# Patient Record
Sex: MALE | Race: WHITE | NOT HISPANIC OR LATINO | Employment: OTHER | URBAN - NONMETROPOLITAN AREA
[De-identification: names, ages, dates, MRNs, and addresses within clinical notes are randomized per-mention and may not be internally consistent; named-entity substitution may affect disease eponyms.]

---

## 2021-06-17 ENCOUNTER — HOSPITAL ENCOUNTER (EMERGENCY)
Facility: HOSPITAL | Age: 71
Discharge: ANOTHER HOSPITAL | End: 2021-06-17
Attending: FAMILY MEDICINE | Admitting: FAMILY MEDICINE

## 2021-06-17 ENCOUNTER — HOSPITAL ENCOUNTER (OUTPATIENT)
Dept: HOSPITAL 79 - MED SURG 4 | Age: 71
Setting detail: OBSERVATION
LOS: 4 days | Discharge: HOME | End: 2021-06-21
Attending: HOSPITALIST | Admitting: EMERGENCY MEDICINE
Payer: MEDICARE

## 2021-06-17 ENCOUNTER — APPOINTMENT (OUTPATIENT)
Dept: CT IMAGING | Facility: HOSPITAL | Age: 71
End: 2021-06-17

## 2021-06-17 VITALS
HEIGHT: 64 IN | BODY MASS INDEX: 44.39 KG/M2 | DIASTOLIC BLOOD PRESSURE: 70 MMHG | WEIGHT: 260 LBS | SYSTOLIC BLOOD PRESSURE: 152 MMHG | OXYGEN SATURATION: 96 % | HEART RATE: 72 BPM | TEMPERATURE: 98.6 F | RESPIRATION RATE: 16 BRPM

## 2021-06-17 VITALS — WEIGHT: 260 LBS | BODY MASS INDEX: 44.39 KG/M2 | HEIGHT: 64 IN

## 2021-06-17 DIAGNOSIS — R53.1: ICD-10-CM

## 2021-06-17 DIAGNOSIS — Z79.899: ICD-10-CM

## 2021-06-17 DIAGNOSIS — Z86.16: ICD-10-CM

## 2021-06-17 DIAGNOSIS — K92.2 GASTROINTESTINAL HEMORRHAGE, UNSPECIFIED GASTROINTESTINAL HEMORRHAGE TYPE: Primary | ICD-10-CM

## 2021-06-17 DIAGNOSIS — K57.30: ICD-10-CM

## 2021-06-17 DIAGNOSIS — K64.1: Primary | ICD-10-CM

## 2021-06-17 DIAGNOSIS — E66.01: ICD-10-CM

## 2021-06-17 DIAGNOSIS — N30.00: ICD-10-CM

## 2021-06-17 DIAGNOSIS — D64.9: ICD-10-CM

## 2021-06-17 DIAGNOSIS — R31.9 URINARY TRACT INFECTION WITH HEMATURIA, SITE UNSPECIFIED: ICD-10-CM

## 2021-06-17 DIAGNOSIS — I10: ICD-10-CM

## 2021-06-17 DIAGNOSIS — Z20.822: ICD-10-CM

## 2021-06-17 DIAGNOSIS — N40.0: ICD-10-CM

## 2021-06-17 DIAGNOSIS — Z86.73: ICD-10-CM

## 2021-06-17 DIAGNOSIS — N39.0 URINARY TRACT INFECTION WITH HEMATURIA, SITE UNSPECIFIED: ICD-10-CM

## 2021-06-17 LAB
ABO GROUP BLD: NORMAL
ABO GROUP BLD: NORMAL
ALBUMIN SERPL-MCNC: 3.44 G/DL (ref 3.5–5.2)
ALBUMIN/GLOB SERPL: 1 G/DL
ALP SERPL-CCNC: 82 U/L (ref 39–117)
ALT SERPL W P-5'-P-CCNC: 7 U/L (ref 1–41)
ANION GAP SERPL CALCULATED.3IONS-SCNC: 9.4 MMOL/L (ref 5–15)
AST SERPL-CCNC: 20 U/L (ref 1–40)
BACTERIA UR QL AUTO: ABNORMAL /HPF
BASOPHILS # BLD AUTO: 0.07 10*3/MM3 (ref 0–0.2)
BASOPHILS NFR BLD AUTO: 0.7 % (ref 0–1.5)
BILIRUB SERPL-MCNC: 0.7 MG/DL (ref 0–1.2)
BILIRUB UR QL STRIP: NEGATIVE
BLD GP AB SCN SERPL QL: NEGATIVE
BUN SERPL-MCNC: 14 MG/DL (ref 8–23)
BUN/CREAT SERPL: 10.9 (ref 7–25)
CALCIUM SPEC-SCNC: 9.1 MG/DL (ref 8.6–10.5)
CHLORIDE SERPL-SCNC: 105 MMOL/L (ref 98–107)
CLARITY UR: ABNORMAL
CO2 SERPL-SCNC: 26.6 MMOL/L (ref 22–29)
COLOR UR: YELLOW
CREAT SERPL-MCNC: 1.29 MG/DL (ref 0.76–1.27)
CRP SERPL-MCNC: 4.64 MG/DL (ref 0–0.5)
D-LACTATE SERPL-SCNC: 1.5 MMOL/L (ref 0.5–2)
DEPRECATED RDW RBC AUTO: 52.6 FL (ref 37–54)
EOSINOPHIL # BLD AUTO: 0.09 10*3/MM3 (ref 0–0.4)
EOSINOPHIL NFR BLD AUTO: 0.9 % (ref 0.3–6.2)
ERYTHROCYTE [DISTWIDTH] IN BLOOD BY AUTOMATED COUNT: 16.2 % (ref 12.3–15.4)
GFR SERPL CREATININE-BSD FRML MDRD: 55 ML/MIN/1.73
GLOBULIN UR ELPH-MCNC: 3.4 GM/DL
GLUCOSE SERPL-MCNC: 107 MG/DL (ref 65–99)
GLUCOSE UR STRIP-MCNC: NEGATIVE MG/DL
HCT VFR BLD AUTO: 35.3 % (ref 37.5–51)
HGB BLD-MCNC: 11.2 G/DL (ref 13–17.7)
HGB UR QL STRIP.AUTO: ABNORMAL
HOLD SPECIMEN: NORMAL
HOLD SPECIMEN: NORMAL
HYALINE CASTS UR QL AUTO: ABNORMAL /LPF
IMM GRANULOCYTES # BLD AUTO: 0.05 10*3/MM3 (ref 0–0.05)
IMM GRANULOCYTES NFR BLD AUTO: 0.5 % (ref 0–0.5)
INR PPP: 1.02 (ref 0.9–1.1)
KETONES UR QL STRIP: NEGATIVE
LEUKOCYTE ESTERASE UR QL STRIP.AUTO: ABNORMAL
LIPASE SERPL-CCNC: 12 U/L (ref 13–60)
LYMPHOCYTES # BLD AUTO: 3.58 10*3/MM3 (ref 0.7–3.1)
LYMPHOCYTES NFR BLD AUTO: 34.2 % (ref 19.6–45.3)
MCH RBC QN AUTO: 28 PG (ref 26.6–33)
MCHC RBC AUTO-ENTMCNC: 31.7 G/DL (ref 31.5–35.7)
MCV RBC AUTO: 88.3 FL (ref 79–97)
MONOCYTES # BLD AUTO: 1.01 10*3/MM3 (ref 0.1–0.9)
MONOCYTES NFR BLD AUTO: 9.7 % (ref 5–12)
NEUTROPHILS NFR BLD AUTO: 5.66 10*3/MM3 (ref 1.7–7)
NEUTROPHILS NFR BLD AUTO: 54 % (ref 42.7–76)
NITRITE UR QL STRIP: POSITIVE
NRBC BLD AUTO-RTO: 0 /100 WBC (ref 0–0.2)
PH UR STRIP.AUTO: 8.5 [PH] (ref 5–8)
PLATELET # BLD AUTO: 296 10*3/MM3 (ref 140–450)
PMV BLD AUTO: 10 FL (ref 6–12)
POTASSIUM SERPL-SCNC: 4.8 MMOL/L (ref 3.5–5.2)
PROT SERPL-MCNC: 6.8 G/DL (ref 6–8.5)
PROT UR QL STRIP: ABNORMAL
PROTHROMBIN TIME: 13.8 SECONDS (ref 12.8–14.5)
RBC # BLD AUTO: 4 10*6/MM3 (ref 4.14–5.8)
RBC # UR: ABNORMAL /HPF
REF LAB TEST METHOD: ABNORMAL
RH BLD: POSITIVE
RH BLD: POSITIVE
SODIUM SERPL-SCNC: 141 MMOL/L (ref 136–145)
SP GR UR STRIP: 1.01 (ref 1–1.03)
SQUAMOUS #/AREA URNS HPF: ABNORMAL /HPF
T&S EXPIRATION DATE: NORMAL
UROBILINOGEN UR QL STRIP: ABNORMAL
WBC # BLD AUTO: 10.46 10*3/MM3 (ref 3.4–10.8)
WBC UR QL AUTO: ABNORMAL /HPF
WHOLE BLOOD HOLD SPECIMEN: NORMAL

## 2021-06-17 PROCEDURE — 85025 COMPLETE CBC W/AUTO DIFF WBC: CPT | Performed by: FAMILY MEDICINE

## 2021-06-17 PROCEDURE — 74177 CT ABD & PELVIS W/CONTRAST: CPT | Performed by: RADIOLOGY

## 2021-06-17 PROCEDURE — 25010000002 CEFTRIAXONE PER 250 MG: Performed by: FAMILY MEDICINE

## 2021-06-17 PROCEDURE — 86900 BLOOD TYPING SEROLOGIC ABO: CPT | Performed by: FAMILY MEDICINE

## 2021-06-17 PROCEDURE — C9113 INJ PANTOPRAZOLE SODIUM, VIA: HCPCS

## 2021-06-17 PROCEDURE — 86901 BLOOD TYPING SEROLOGIC RH(D): CPT

## 2021-06-17 PROCEDURE — G0379 DIRECT REFER HOSPITAL OBSERV: HCPCS

## 2021-06-17 PROCEDURE — 83690 ASSAY OF LIPASE: CPT | Performed by: FAMILY MEDICINE

## 2021-06-17 PROCEDURE — 74177 CT ABD & PELVIS W/CONTRAST: CPT

## 2021-06-17 PROCEDURE — U0002 COVID-19 LAB TEST NON-CDC: HCPCS

## 2021-06-17 PROCEDURE — 85610 PROTHROMBIN TIME: CPT | Performed by: FAMILY MEDICINE

## 2021-06-17 PROCEDURE — 83605 ASSAY OF LACTIC ACID: CPT | Performed by: FAMILY MEDICINE

## 2021-06-17 PROCEDURE — 87040 BLOOD CULTURE FOR BACTERIA: CPT | Performed by: FAMILY MEDICINE

## 2021-06-17 PROCEDURE — 36415 COLL VENOUS BLD VENIPUNCTURE: CPT

## 2021-06-17 PROCEDURE — G0378 HOSPITAL OBSERVATION PER HR: HCPCS

## 2021-06-17 PROCEDURE — 80053 COMPREHEN METABOLIC PANEL: CPT | Performed by: FAMILY MEDICINE

## 2021-06-17 PROCEDURE — 86850 RBC ANTIBODY SCREEN: CPT | Performed by: FAMILY MEDICINE

## 2021-06-17 PROCEDURE — 81001 URINALYSIS AUTO W/SCOPE: CPT | Performed by: FAMILY MEDICINE

## 2021-06-17 PROCEDURE — 86140 C-REACTIVE PROTEIN: CPT | Performed by: FAMILY MEDICINE

## 2021-06-17 PROCEDURE — 86901 BLOOD TYPING SEROLOGIC RH(D): CPT | Performed by: FAMILY MEDICINE

## 2021-06-17 PROCEDURE — P9612 CATHETERIZE FOR URINE SPEC: HCPCS

## 2021-06-17 PROCEDURE — 99284 EMERGENCY DEPT VISIT MOD MDM: CPT

## 2021-06-17 PROCEDURE — 96365 THER/PROPH/DIAG IV INF INIT: CPT

## 2021-06-17 PROCEDURE — 86900 BLOOD TYPING SEROLOGIC ABO: CPT

## 2021-06-17 PROCEDURE — 25010000002 IOPAMIDOL 61 % SOLUTION: Performed by: FAMILY MEDICINE

## 2021-06-17 RX ORDER — SODIUM CHLORIDE 0.9 % (FLUSH) 0.9 %
10 SYRINGE (ML) INJECTION AS NEEDED
Status: DISCONTINUED | OUTPATIENT
Start: 2021-06-17 | End: 2021-06-17 | Stop reason: HOSPADM

## 2021-06-17 RX ADMIN — CEFTRIAXONE 1 G: 1 INJECTION, POWDER, FOR SOLUTION INTRAMUSCULAR; INTRAVENOUS at 16:35

## 2021-06-17 RX ADMIN — IOPAMIDOL 100 ML: 612 INJECTION, SOLUTION INTRAVENOUS at 16:38

## 2021-06-18 LAB
BUN/CREATININE RATIO: 12 (ref 0–10)
HGB BLD-MCNC: 10.4 GM/DL (ref 14–17.5)
RED BLOOD COUNT: 3.62 M/UL (ref 4.2–5.5)
WHITE BLOOD COUNT: 8.3 K/UL (ref 4.5–11)

## 2021-06-19 LAB
BUN/CREATININE RATIO: 10 (ref 0–10)
HGB BLD-MCNC: 10.5 GM/DL (ref 14–17.5)
RED BLOOD COUNT: 3.64 M/UL (ref 4.2–5.5)
WHITE BLOOD COUNT: 9.2 K/UL (ref 4.5–11)

## 2021-06-20 LAB
BUN/CREATININE RATIO: 13 (ref 0–10)
HGB BLD-MCNC: 10.9 GM/DL (ref 14–17.5)
RED BLOOD COUNT: 3.82 M/UL (ref 4.2–5.5)
WHITE BLOOD COUNT: 8.9 K/UL (ref 4.5–11)

## 2021-06-21 LAB
BUN/CREATININE RATIO: 10 (ref 0–10)
HGB BLD-MCNC: 11.4 GM/DL (ref 14–17.5)
RED BLOOD COUNT: 3.99 M/UL (ref 4.2–5.5)
WHITE BLOOD COUNT: 8.8 K/UL (ref 4.5–11)

## 2021-06-22 LAB
BACTERIA SPEC AEROBE CULT: NORMAL
BACTERIA SPEC AEROBE CULT: NORMAL

## 2022-10-06 ENCOUNTER — APPOINTMENT (OUTPATIENT)
Dept: GENERAL RADIOLOGY | Facility: HOSPITAL | Age: 72
End: 2022-10-06

## 2022-10-06 ENCOUNTER — APPOINTMENT (OUTPATIENT)
Dept: CT IMAGING | Facility: HOSPITAL | Age: 72
End: 2022-10-06

## 2022-10-06 ENCOUNTER — HOSPITAL ENCOUNTER (EMERGENCY)
Facility: HOSPITAL | Age: 72
Discharge: HOME | End: 2022-10-06
Attending: STUDENT IN AN ORGANIZED HEALTH CARE EDUCATION/TRAINING PROGRAM | Admitting: STUDENT IN AN ORGANIZED HEALTH CARE EDUCATION/TRAINING PROGRAM

## 2022-10-06 VITALS
HEART RATE: 81 BPM | RESPIRATION RATE: 18 BRPM | OXYGEN SATURATION: 92 % | DIASTOLIC BLOOD PRESSURE: 76 MMHG | BODY MASS INDEX: 42.52 KG/M2 | HEIGHT: 63 IN | SYSTOLIC BLOOD PRESSURE: 145 MMHG | TEMPERATURE: 98.2 F | WEIGHT: 240 LBS

## 2022-10-06 DIAGNOSIS — M87.051 AVASCULAR NECROSIS OF BONE OF RIGHT HIP: Primary | ICD-10-CM

## 2022-10-06 LAB
ALBUMIN SERPL-MCNC: 3.8 G/DL (ref 3.5–5.2)
ALBUMIN/GLOB SERPL: 1 G/DL
ALP SERPL-CCNC: 78 U/L (ref 39–117)
ALT SERPL W P-5'-P-CCNC: 33 U/L (ref 1–41)
ANION GAP SERPL CALCULATED.3IONS-SCNC: 12.8 MMOL/L (ref 5–15)
AST SERPL-CCNC: 100 U/L (ref 1–40)
BASOPHILS # BLD AUTO: 0.02 10*3/MM3 (ref 0–0.2)
BASOPHILS NFR BLD AUTO: 0.2 % (ref 0–1.5)
BILIRUB SERPL-MCNC: 0.9 MG/DL (ref 0–1.2)
BUN SERPL-MCNC: 32 MG/DL (ref 8–23)
BUN/CREAT SERPL: 23.2 (ref 7–25)
CALCIUM SPEC-SCNC: 9.2 MG/DL (ref 8.6–10.5)
CHLORIDE SERPL-SCNC: 105 MMOL/L (ref 98–107)
CO2 SERPL-SCNC: 23.2 MMOL/L (ref 22–29)
CREAT SERPL-MCNC: 1.38 MG/DL (ref 0.76–1.27)
DEPRECATED RDW RBC AUTO: 49.2 FL (ref 37–54)
EGFRCR SERPLBLD CKD-EPI 2021: 54.3 ML/MIN/1.73
EOSINOPHIL # BLD AUTO: 0.01 10*3/MM3 (ref 0–0.4)
EOSINOPHIL NFR BLD AUTO: 0.1 % (ref 0.3–6.2)
ERYTHROCYTE [DISTWIDTH] IN BLOOD BY AUTOMATED COUNT: 15 % (ref 12.3–15.4)
FLUAV SUBTYP SPEC NAA+PROBE: NOT DETECTED
FLUBV RNA ISLT QL NAA+PROBE: NOT DETECTED
GLOBULIN UR ELPH-MCNC: 3.7 GM/DL
GLUCOSE SERPL-MCNC: 166 MG/DL (ref 65–99)
HCT VFR BLD AUTO: 38.8 % (ref 37.5–51)
HGB BLD-MCNC: 13.1 G/DL (ref 13–17.7)
IMM GRANULOCYTES # BLD AUTO: 0.06 10*3/MM3 (ref 0–0.05)
IMM GRANULOCYTES NFR BLD AUTO: 0.5 % (ref 0–0.5)
INR PPP: 1.02 (ref 0.9–1.1)
LYMPHOCYTES # BLD AUTO: 0.8 10*3/MM3 (ref 0.7–3.1)
LYMPHOCYTES NFR BLD AUTO: 6 % (ref 19.6–45.3)
MCH RBC QN AUTO: 29.8 PG (ref 26.6–33)
MCHC RBC AUTO-ENTMCNC: 33.8 G/DL (ref 31.5–35.7)
MCV RBC AUTO: 88.2 FL (ref 79–97)
MONOCYTES # BLD AUTO: 0.91 10*3/MM3 (ref 0.1–0.9)
MONOCYTES NFR BLD AUTO: 6.9 % (ref 5–12)
NEUTROPHILS NFR BLD AUTO: 11.43 10*3/MM3 (ref 1.7–7)
NEUTROPHILS NFR BLD AUTO: 86.3 % (ref 42.7–76)
NRBC BLD AUTO-RTO: 0 /100 WBC (ref 0–0.2)
PLATELET # BLD AUTO: 179 10*3/MM3 (ref 140–450)
PMV BLD AUTO: 10.1 FL (ref 6–12)
POTASSIUM SERPL-SCNC: 4.4 MMOL/L (ref 3.5–5.2)
PROT SERPL-MCNC: 7.5 G/DL (ref 6–8.5)
PROTHROMBIN TIME: 13.6 SECONDS (ref 12.1–14.7)
RBC # BLD AUTO: 4.4 10*6/MM3 (ref 4.14–5.8)
SARS-COV-2 RNA PNL SPEC NAA+PROBE: NOT DETECTED
SODIUM SERPL-SCNC: 141 MMOL/L (ref 136–145)
WBC NRBC COR # BLD: 13.23 10*3/MM3 (ref 3.4–10.8)

## 2022-10-06 PROCEDURE — 96376 TX/PRO/DX INJ SAME DRUG ADON: CPT

## 2022-10-06 PROCEDURE — 96374 THER/PROPH/DIAG INJ IV PUSH: CPT

## 2022-10-06 PROCEDURE — 73502 X-RAY EXAM HIP UNI 2-3 VIEWS: CPT | Performed by: RADIOLOGY

## 2022-10-06 PROCEDURE — 87636 SARSCOV2 & INF A&B AMP PRB: CPT | Performed by: STUDENT IN AN ORGANIZED HEALTH CARE EDUCATION/TRAINING PROGRAM

## 2022-10-06 PROCEDURE — 96375 TX/PRO/DX INJ NEW DRUG ADDON: CPT

## 2022-10-06 PROCEDURE — 80053 COMPREHEN METABOLIC PANEL: CPT | Performed by: STUDENT IN AN ORGANIZED HEALTH CARE EDUCATION/TRAINING PROGRAM

## 2022-10-06 PROCEDURE — C9803 HOPD COVID-19 SPEC COLLECT: HCPCS

## 2022-10-06 PROCEDURE — 72192 CT PELVIS W/O DYE: CPT

## 2022-10-06 PROCEDURE — 72192 CT PELVIS W/O DYE: CPT | Performed by: RADIOLOGY

## 2022-10-06 PROCEDURE — 25010000002 DEXAMETHASONE PER 1 MG: Performed by: STUDENT IN AN ORGANIZED HEALTH CARE EDUCATION/TRAINING PROGRAM

## 2022-10-06 PROCEDURE — 85025 COMPLETE CBC W/AUTO DIFF WBC: CPT | Performed by: STUDENT IN AN ORGANIZED HEALTH CARE EDUCATION/TRAINING PROGRAM

## 2022-10-06 PROCEDURE — 99284 EMERGENCY DEPT VISIT MOD MDM: CPT

## 2022-10-06 PROCEDURE — 85610 PROTHROMBIN TIME: CPT | Performed by: STUDENT IN AN ORGANIZED HEALTH CARE EDUCATION/TRAINING PROGRAM

## 2022-10-06 PROCEDURE — 25010000002 MORPHINE PER 10 MG: Performed by: STUDENT IN AN ORGANIZED HEALTH CARE EDUCATION/TRAINING PROGRAM

## 2022-10-06 PROCEDURE — 25010000002 ONDANSETRON PER 1 MG: Performed by: STUDENT IN AN ORGANIZED HEALTH CARE EDUCATION/TRAINING PROGRAM

## 2022-10-06 PROCEDURE — 25010000002 KETOROLAC TROMETHAMINE PER 15 MG: Performed by: STUDENT IN AN ORGANIZED HEALTH CARE EDUCATION/TRAINING PROGRAM

## 2022-10-06 PROCEDURE — 73502 X-RAY EXAM HIP UNI 2-3 VIEWS: CPT

## 2022-10-06 RX ORDER — DEXAMETHASONE SODIUM PHOSPHATE 4 MG/ML
8 INJECTION, SOLUTION INTRA-ARTICULAR; INTRALESIONAL; INTRAMUSCULAR; INTRAVENOUS; SOFT TISSUE ONCE
Status: COMPLETED | OUTPATIENT
Start: 2022-10-06 | End: 2022-10-06

## 2022-10-06 RX ORDER — KETOROLAC TROMETHAMINE 30 MG/ML
15 INJECTION, SOLUTION INTRAMUSCULAR; INTRAVENOUS ONCE
Status: COMPLETED | OUTPATIENT
Start: 2022-10-06 | End: 2022-10-06

## 2022-10-06 RX ORDER — HYDROCODONE BITARTRATE AND ACETAMINOPHEN 5; 325 MG/1; MG/1
1 TABLET ORAL EVERY 8 HOURS PRN
Qty: 10 TABLET | Refills: 0 | Status: SHIPPED | OUTPATIENT
Start: 2022-10-06

## 2022-10-06 RX ORDER — ONDANSETRON 2 MG/ML
4 INJECTION INTRAMUSCULAR; INTRAVENOUS ONCE
Status: COMPLETED | OUTPATIENT
Start: 2022-10-06 | End: 2022-10-06

## 2022-10-06 RX ORDER — MORPHINE SULFATE 2 MG/ML
2 INJECTION, SOLUTION INTRAMUSCULAR; INTRAVENOUS ONCE
Status: COMPLETED | OUTPATIENT
Start: 2022-10-06 | End: 2022-10-06

## 2022-10-06 RX ORDER — HYDROCODONE BITARTRATE AND ACETAMINOPHEN 5; 325 MG/1; MG/1
1 TABLET ORAL ONCE
Status: COMPLETED | OUTPATIENT
Start: 2022-10-06 | End: 2022-10-06

## 2022-10-06 RX ADMIN — HYDROCODONE BITARTRATE AND ACETAMINOPHEN 1 TABLET: 5; 325 TABLET ORAL at 12:50

## 2022-10-06 RX ADMIN — MORPHINE SULFATE 2 MG: 2 INJECTION, SOLUTION INTRAMUSCULAR; INTRAVENOUS at 09:12

## 2022-10-06 RX ADMIN — ONDANSETRON 4 MG: 2 INJECTION INTRAMUSCULAR; INTRAVENOUS at 08:01

## 2022-10-06 RX ADMIN — DEXAMETHASONE SODIUM PHOSPHATE 8 MG: 4 INJECTION, SOLUTION INTRA-ARTICULAR; INTRALESIONAL; INTRAMUSCULAR; INTRAVENOUS; SOFT TISSUE at 11:45

## 2022-10-06 RX ADMIN — KETOROLAC TROMETHAMINE 15 MG: 30 INJECTION, SOLUTION INTRAMUSCULAR at 10:12

## 2022-10-06 RX ADMIN — MORPHINE SULFATE 4 MG: 4 INJECTION, SOLUTION INTRAMUSCULAR; INTRAVENOUS at 08:01

## 2022-10-06 NOTE — ED PROVIDER NOTES
Subjective   History of Present Illness  71-year-old male presents to the ER after falling at his home 3 to 4 days prior to this presentation.  Patient normally uses a walker and needs lifting assistance with his automated recliner to stand and walk.  Patient noted he slid out of the recliner and noted right-sided hip pain that improved throughout the day.  Patient noted he was able to carry out his ADLs over the next 2 to 3 days.  However, earlier today, the patient noted he was unable to stand on his right hip.  Patient noted severe pain in the right greater trochanter area.  Denied new injury or trauma.  Denied head trauma loss of consciousness.  External rotation and shortening of the right lower extremity was noted.  Vitals stable        Review of Systems   Musculoskeletal:        Right hip pain   All other systems reviewed and are negative.      No past medical history on file.    No Known Allergies    No past surgical history on file.    No family history on file.    Social History     Socioeconomic History   • Marital status:            Objective   Physical Exam  Constitutional:       General: He is not in acute distress.     Appearance: He is well-developed. He is not ill-appearing.   HENT:      Head: Normocephalic and atraumatic.   Eyes:      Extraocular Movements: Extraocular movements intact.      Pupils: Pupils are equal, round, and reactive to light.   Neck:      Vascular: No JVD.   Cardiovascular:      Rate and Rhythm: Normal rate and regular rhythm.      Heart sounds: Normal heart sounds. No murmur heard.  Pulmonary:      Effort: No tachypnea, accessory muscle usage or respiratory distress.      Breath sounds: Normal breath sounds. No stridor. No decreased breath sounds, wheezing, rhonchi or rales.   Chest:      Chest wall: No deformity, tenderness or crepitus.   Abdominal:      General: Bowel sounds are normal.      Palpations: Abdomen is soft.      Tenderness: There is no abdominal  tenderness. There is no guarding or rebound.   Musculoskeletal:         General: Normal range of motion.      Cervical back: Normal range of motion and neck supple.      Right lower leg: Tenderness present. No edema.      Left lower leg: No tenderness. No edema.        Legs:       Comments: Tenderness to palpation of the right greater trochanter.  The right lower extremity is shortened and externally rotated in comparison to the left.  Reduction in range of motion secondary to pain.   Lymphadenopathy:      Cervical: No cervical adenopathy.   Skin:     General: Skin is warm and dry.      Coloration: Skin is not cyanotic.      Findings: No ecchymosis or erythema.   Neurological:      General: No focal deficit present.      Mental Status: He is alert and oriented to person, place, and time.      Cranial Nerves: No cranial nerve deficit.      Motor: No weakness.   Psychiatric:         Mood and Affect: Mood normal. Mood is not anxious.         Behavior: Behavior normal. Behavior is not agitated.         Procedures           ED Course  ED Course as of 10/06/22 1151   Thu Oct 06, 2022   1147 CBC and CMP unremarkable.  Coagulation studies unremarkable.  Plain film imaging of the right hip noted concerns for chronic avascular necrosis and deformity.  CT of the pelvis confirmed these findings.  No acute fracture noted.  I discussed these findings with the on-call orthopedic surgeon they noted outpatient follow-up for further surgical considerations would be appropriate.  Patient will be discharged home with pain medicine.  Work up and results were discussed throughly with the patient.  The patient will be discharged for further monitoring and management with their PCP.  Red flags, warning signs, worsening symptoms, and when to return to the ER discussed with and understood by the patient.  Patient will follow up with their PCP in a timely manner.  Vitals stable at discharge. [SF]      ED Course User Index  [SF] Juancarlos Spencer  DO KARLENE                                           [facility]    Final diagnoses:   Avascular necrosis of bone of right hip (HCC)       ED Disposition  ED Disposition     ED Disposition   Discharge    Condition   Stable    Comment   --             [facility] Emergency Department  [address]  [facility] [address] [phone]    If symptoms worsen    Chris Alberto DO  [address] [facility] [address]  [phone]    In 1 week      PATIENT CONNECTION - [place]  See Provider List  [place] [id number]  [phone]  In 1 week           Medication List      New Prescriptions    HYDROcodone-acetaminophen 5-325 MG per tablet  Commonly known as: NORCO  Take 1 tablet by mouth Every 8 (Eight) Hours As Needed for Moderate Pain.           Where to Get Your Medications      These medications were sent to [facility] - [place], [place] - [address] - [phone]  - [phone] FX  [address], [facility] [address]    Phone: [phone]   · HYDROcodone-acetaminophen 5-325 MG per tablet          Juancarlos Spencer DO  10/06/22 1151

## 2022-10-06 NOTE — ED NOTES
Attempted to call pt's son in law, Michael [phone], at this time, no answer.   
Catheter bag leaking. Catheter bag changed at this time.   
Michelle woods advised that we have no crew to take the pt today   
Sp with daughter Maryann at this time at [phone]. daughter states she will be to  pt as soon as she can. Pt states she works till 4  
[facility] refusing to transfer pt due to pt ambulating to stretcher.  
pratik has been contacted tien she advised to fax pcs form   
yes

## 2022-10-07 ENCOUNTER — APPOINTMENT (OUTPATIENT)
Dept: GENERAL RADIOLOGY | Facility: HOSPITAL | Age: 72
End: 2022-10-07

## 2022-10-07 ENCOUNTER — HOSPITAL ENCOUNTER (INPATIENT)
Facility: HOSPITAL | Age: 72
LOS: 13 days | Discharge: NURSING, REHAB OR LONG-TERM CARE | End: 2022-10-20
Attending: STUDENT IN AN ORGANIZED HEALTH CARE EDUCATION/TRAINING PROGRAM | Admitting: ORTHOPAEDIC SURGERY

## 2022-10-07 ENCOUNTER — APPOINTMENT (OUTPATIENT)
Dept: MRI IMAGING | Facility: HOSPITAL | Age: 72
End: 2022-10-07

## 2022-10-07 DIAGNOSIS — M00.9 SEPTIC HIP: Primary | ICD-10-CM

## 2022-10-07 LAB
ALBUMIN SERPL-MCNC: 3.61 G/DL (ref 3.5–5.2)
ALBUMIN/GLOB SERPL: 1 G/DL
ALP SERPL-CCNC: 70 U/L (ref 39–117)
ALT SERPL W P-5'-P-CCNC: 28 U/L (ref 1–41)
ANION GAP SERPL CALCULATED.3IONS-SCNC: 14.7 MMOL/L (ref 5–15)
AST SERPL-CCNC: 61 U/L (ref 1–40)
BACTERIA UR QL AUTO: ABNORMAL /HPF
BASOPHILS # BLD AUTO: 0.03 10*3/MM3 (ref 0–0.2)
BASOPHILS NFR BLD AUTO: 0.2 % (ref 0–1.5)
BILIRUB SERPL-MCNC: 0.9 MG/DL (ref 0–1.2)
BILIRUB UR QL STRIP: NEGATIVE
BUN SERPL-MCNC: 23 MG/DL (ref 8–23)
BUN/CREAT SERPL: 20 (ref 7–25)
CALCIUM SPEC-SCNC: 9.4 MG/DL (ref 8.6–10.5)
CHLORIDE SERPL-SCNC: 103 MMOL/L (ref 98–107)
CLARITY UR: CLEAR
CO2 SERPL-SCNC: 23.3 MMOL/L (ref 22–29)
COLOR UR: YELLOW
CREAT SERPL-MCNC: 1.15 MG/DL (ref 0.76–1.27)
CRP SERPL-MCNC: 21.28 MG/DL (ref 0–0.5)
D-LACTATE SERPL-SCNC: 0.9 MMOL/L (ref 0.5–2)
DEPRECATED RDW RBC AUTO: 47.9 FL (ref 37–54)
EGFRCR SERPLBLD CKD-EPI 2021: 67.6 ML/MIN/1.73
EOSINOPHIL # BLD AUTO: 0 10*3/MM3 (ref 0–0.4)
EOSINOPHIL NFR BLD AUTO: 0 % (ref 0.3–6.2)
ERYTHROCYTE [DISTWIDTH] IN BLOOD BY AUTOMATED COUNT: 14.9 % (ref 12.3–15.4)
ERYTHROCYTE [SEDIMENTATION RATE] IN BLOOD: 71 MM/HR (ref 0–20)
FLUAV RNA RESP QL NAA+PROBE: NOT DETECTED
FLUBV RNA RESP QL NAA+PROBE: NOT DETECTED
GLOBULIN UR ELPH-MCNC: 3.5 GM/DL
GLUCOSE SERPL-MCNC: 134 MG/DL (ref 65–99)
GLUCOSE UR STRIP-MCNC: NEGATIVE MG/DL
HCT VFR BLD AUTO: 38.5 % (ref 37.5–51)
HGB BLD-MCNC: 13.2 G/DL (ref 13–17.7)
HGB UR QL STRIP.AUTO: ABNORMAL
HYALINE CASTS UR QL AUTO: ABNORMAL /LPF
IMM GRANULOCYTES # BLD AUTO: 0.11 10*3/MM3 (ref 0–0.05)
IMM GRANULOCYTES NFR BLD AUTO: 0.7 % (ref 0–0.5)
KETONES UR QL STRIP: ABNORMAL
LEUKOCYTE ESTERASE UR QL STRIP.AUTO: ABNORMAL
LYMPHOCYTES # BLD AUTO: 0.98 10*3/MM3 (ref 0.7–3.1)
LYMPHOCYTES NFR BLD AUTO: 6.2 % (ref 19.6–45.3)
MCH RBC QN AUTO: 29.9 PG (ref 26.6–33)
MCHC RBC AUTO-ENTMCNC: 34.3 G/DL (ref 31.5–35.7)
MCV RBC AUTO: 87.1 FL (ref 79–97)
MONOCYTES # BLD AUTO: 1.08 10*3/MM3 (ref 0.1–0.9)
MONOCYTES NFR BLD AUTO: 6.8 % (ref 5–12)
NEUTROPHILS NFR BLD AUTO: 13.63 10*3/MM3 (ref 1.7–7)
NEUTROPHILS NFR BLD AUTO: 86.1 % (ref 42.7–76)
NITRITE UR QL STRIP: POSITIVE
NRBC BLD AUTO-RTO: 0 /100 WBC (ref 0–0.2)
PH UR STRIP.AUTO: 7 [PH] (ref 5–8)
PLATELET # BLD AUTO: 203 10*3/MM3 (ref 140–450)
PMV BLD AUTO: 10.1 FL (ref 6–12)
POTASSIUM SERPL-SCNC: 4.3 MMOL/L (ref 3.5–5.2)
PROCALCITONIN SERPL-MCNC: 4.29 NG/ML (ref 0–0.25)
PROT SERPL-MCNC: 7.1 G/DL (ref 6–8.5)
PROT UR QL STRIP: ABNORMAL
RBC # BLD AUTO: 4.42 10*6/MM3 (ref 4.14–5.8)
RBC # UR STRIP: ABNORMAL /HPF
REF LAB TEST METHOD: ABNORMAL
SARS-COV-2 RNA RESP QL NAA+PROBE: NOT DETECTED
SODIUM SERPL-SCNC: 141 MMOL/L (ref 136–145)
SP GR UR STRIP: 1.02 (ref 1–1.03)
SQUAMOUS #/AREA URNS HPF: ABNORMAL /HPF
UROBILINOGEN UR QL STRIP: ABNORMAL
WBC # UR STRIP: ABNORMAL /HPF
WBC NRBC COR # BLD: 15.83 10*3/MM3 (ref 3.4–10.8)

## 2022-10-07 PROCEDURE — 25010000002 CEFTRIAXONE PER 250 MG

## 2022-10-07 PROCEDURE — 25010000002 MORPHINE PER 10 MG: Performed by: NURSE PRACTITIONER

## 2022-10-07 PROCEDURE — 84145 PROCALCITONIN (PCT): CPT | Performed by: NURSE PRACTITIONER

## 2022-10-07 PROCEDURE — 87077 CULTURE AEROBIC IDENTIFY: CPT | Performed by: INTERNAL MEDICINE

## 2022-10-07 PROCEDURE — 71045 X-RAY EXAM CHEST 1 VIEW: CPT | Performed by: RADIOLOGY

## 2022-10-07 PROCEDURE — 87186 SC STD MICRODIL/AGAR DIL: CPT | Performed by: NURSE PRACTITIONER

## 2022-10-07 PROCEDURE — 73723 MRI JOINT LWR EXTR W/O&W/DYE: CPT

## 2022-10-07 PROCEDURE — 93005 ELECTROCARDIOGRAM TRACING: CPT

## 2022-10-07 PROCEDURE — 25010000002 HEPARIN (PORCINE) PER 1000 UNITS: Performed by: INTERNAL MEDICINE

## 2022-10-07 PROCEDURE — 87086 URINE CULTURE/COLONY COUNT: CPT | Performed by: INTERNAL MEDICINE

## 2022-10-07 PROCEDURE — 93010 ELECTROCARDIOGRAM REPORT: CPT | Performed by: INTERNAL MEDICINE

## 2022-10-07 PROCEDURE — 73723 MRI JOINT LWR EXTR W/O&W/DYE: CPT | Performed by: RADIOLOGY

## 2022-10-07 PROCEDURE — 80053 COMPREHEN METABOLIC PANEL: CPT | Performed by: NURSE PRACTITIONER

## 2022-10-07 PROCEDURE — 81001 URINALYSIS AUTO W/SCOPE: CPT | Performed by: INTERNAL MEDICINE

## 2022-10-07 PROCEDURE — 83605 ASSAY OF LACTIC ACID: CPT | Performed by: NURSE PRACTITIONER

## 2022-10-07 PROCEDURE — 99222 1ST HOSP IP/OBS MODERATE 55: CPT

## 2022-10-07 PROCEDURE — 86140 C-REACTIVE PROTEIN: CPT | Performed by: NURSE PRACTITIONER

## 2022-10-07 PROCEDURE — 71045 X-RAY EXAM CHEST 1 VIEW: CPT

## 2022-10-07 PROCEDURE — 0 GADOBENATE DIMEGLUMINE 529 MG/ML SOLUTION: Performed by: STUDENT IN AN ORGANIZED HEALTH CARE EDUCATION/TRAINING PROGRAM

## 2022-10-07 PROCEDURE — 87186 SC STD MICRODIL/AGAR DIL: CPT | Performed by: INTERNAL MEDICINE

## 2022-10-07 PROCEDURE — 25010000002 PIPERACILLIN SOD-TAZOBACTAM PER 1 G: Performed by: INTERNAL MEDICINE

## 2022-10-07 PROCEDURE — A9577 INJ MULTIHANCE: HCPCS | Performed by: STUDENT IN AN ORGANIZED HEALTH CARE EDUCATION/TRAINING PROGRAM

## 2022-10-07 PROCEDURE — 85652 RBC SED RATE AUTOMATED: CPT | Performed by: NURSE PRACTITIONER

## 2022-10-07 PROCEDURE — 99285 EMERGENCY DEPT VISIT HI MDM: CPT

## 2022-10-07 PROCEDURE — 25010000002 VANCOMYCIN 5 G RECONSTITUTED SOLUTION: Performed by: NURSE PRACTITIONER

## 2022-10-07 PROCEDURE — 87636 SARSCOV2 & INF A&B AMP PRB: CPT | Performed by: NURSE PRACTITIONER

## 2022-10-07 PROCEDURE — 36415 COLL VENOUS BLD VENIPUNCTURE: CPT

## 2022-10-07 PROCEDURE — 94799 UNLISTED PULMONARY SVC/PX: CPT

## 2022-10-07 PROCEDURE — 87040 BLOOD CULTURE FOR BACTERIA: CPT | Performed by: NURSE PRACTITIONER

## 2022-10-07 PROCEDURE — 87077 CULTURE AEROBIC IDENTIFY: CPT | Performed by: NURSE PRACTITIONER

## 2022-10-07 PROCEDURE — 25010000002 ONDANSETRON PER 1 MG: Performed by: NURSE PRACTITIONER

## 2022-10-07 PROCEDURE — 85025 COMPLETE CBC W/AUTO DIFF WBC: CPT | Performed by: NURSE PRACTITIONER

## 2022-10-07 PROCEDURE — 87150 DNA/RNA AMPLIFIED PROBE: CPT | Performed by: NURSE PRACTITIONER

## 2022-10-07 RX ORDER — TAMSULOSIN HYDROCHLORIDE 0.4 MG/1
1 CAPSULE ORAL DAILY
COMMUNITY

## 2022-10-07 RX ORDER — OXYCODONE HYDROCHLORIDE AND ACETAMINOPHEN 5; 325 MG/1; MG/1
1 TABLET ORAL EVERY 4 HOURS PRN
Status: DISPENSED | OUTPATIENT
Start: 2022-10-07 | End: 2022-10-14

## 2022-10-07 RX ORDER — ACETAMINOPHEN 325 MG/1
650 TABLET ORAL EVERY 6 HOURS PRN
Status: DISCONTINUED | OUTPATIENT
Start: 2022-10-07 | End: 2022-10-20 | Stop reason: HOSPADM

## 2022-10-07 RX ORDER — HYDRALAZINE HYDROCHLORIDE 50 MG/1
50 TABLET, FILM COATED ORAL 3 TIMES DAILY
Status: CANCELLED | OUTPATIENT
Start: 2022-10-07

## 2022-10-07 RX ORDER — HYDRALAZINE HYDROCHLORIDE 50 MG/1
50 TABLET, FILM COATED ORAL 3 TIMES DAILY
COMMUNITY
End: 2022-10-20 | Stop reason: HOSPADM

## 2022-10-07 RX ORDER — SODIUM CHLORIDE 9 MG/ML
50 INJECTION, SOLUTION INTRAVENOUS CONTINUOUS
Status: DISCONTINUED | OUTPATIENT
Start: 2022-10-07 | End: 2022-10-10

## 2022-10-07 RX ORDER — PRENATAL VIT/IRON FUM/FOLIC AC 27MG-0.8MG
1 TABLET ORAL DAILY
COMMUNITY

## 2022-10-07 RX ORDER — SODIUM CHLORIDE 0.9 % (FLUSH) 0.9 %
10 SYRINGE (ML) INJECTION AS NEEDED
Status: DISCONTINUED | OUTPATIENT
Start: 2022-10-07 | End: 2022-10-20 | Stop reason: HOSPADM

## 2022-10-07 RX ORDER — ONDANSETRON 2 MG/ML
4 INJECTION INTRAMUSCULAR; INTRAVENOUS ONCE
Status: COMPLETED | OUTPATIENT
Start: 2022-10-07 | End: 2022-10-07

## 2022-10-07 RX ORDER — SENNOSIDES 8.6 MG
650 CAPSULE ORAL EVERY 8 HOURS PRN
COMMUNITY

## 2022-10-07 RX ORDER — CHOLECALCIFEROL (VITAMIN D3) 125 MCG
5 CAPSULE ORAL NIGHTLY PRN
Status: DISCONTINUED | OUTPATIENT
Start: 2022-10-07 | End: 2022-10-20 | Stop reason: HOSPADM

## 2022-10-07 RX ORDER — AMLODIPINE BESYLATE 10 MG/1
10 TABLET ORAL DAILY
COMMUNITY

## 2022-10-07 RX ORDER — HEPARIN SODIUM 5000 [USP'U]/ML
5000 INJECTION, SOLUTION INTRAVENOUS; SUBCUTANEOUS EVERY 8 HOURS SCHEDULED
Status: DISCONTINUED | OUTPATIENT
Start: 2022-10-07 | End: 2022-10-20 | Stop reason: HOSPADM

## 2022-10-07 RX ORDER — SODIUM CHLORIDE 0.9 % (FLUSH) 0.9 %
10 SYRINGE (ML) INJECTION EVERY 12 HOURS SCHEDULED
Status: DISCONTINUED | OUTPATIENT
Start: 2022-10-07 | End: 2022-10-20 | Stop reason: HOSPADM

## 2022-10-07 RX ORDER — CHOLECALCIFEROL (VITAMIN D3) 50 MCG
2000 TABLET ORAL DAILY
COMMUNITY

## 2022-10-07 RX ADMIN — HEPARIN SODIUM 5000 UNITS: 5000 INJECTION INTRAVENOUS; SUBCUTANEOUS at 21:40

## 2022-10-07 RX ADMIN — PIPERACILLIN SODIUM AND TAZOBACTAM SODIUM 3.38 G: 3; .375 INJECTION, POWDER, LYOPHILIZED, FOR SOLUTION INTRAVENOUS at 17:28

## 2022-10-07 RX ADMIN — OXYCODONE HYDROCHLORIDE AND ACETAMINOPHEN 1 TABLET: 5; 325 TABLET ORAL at 21:40

## 2022-10-07 RX ADMIN — MORPHINE SULFATE 4 MG: 4 INJECTION, SOLUTION INTRAMUSCULAR; INTRAVENOUS at 11:51

## 2022-10-07 RX ADMIN — CEFTRIAXONE 1 G: 1 INJECTION, POWDER, FOR SOLUTION INTRAMUSCULAR; INTRAVENOUS at 18:49

## 2022-10-07 RX ADMIN — VANCOMYCIN HYDROCHLORIDE 2000 MG: 5 INJECTION, POWDER, LYOPHILIZED, FOR SOLUTION INTRAVENOUS at 12:50

## 2022-10-07 RX ADMIN — Medication 10 ML: at 21:40

## 2022-10-07 RX ADMIN — SODIUM CHLORIDE 50 ML/HR: 9 INJECTION, SOLUTION INTRAVENOUS at 17:28

## 2022-10-07 RX ADMIN — GADOBENATE DIMEGLUMINE 20 ML: 529 INJECTION, SOLUTION INTRAVENOUS at 13:40

## 2022-10-07 RX ADMIN — Medication 5 MG: at 21:40

## 2022-10-07 RX ADMIN — ONDANSETRON 4 MG: 2 INJECTION INTRAMUSCULAR; INTRAVENOUS at 11:51

## 2022-10-07 NOTE — H&P
[facility] Medicine Services  History & Physical    Patient Identification:  Name:  Solitario Mcdaniel  Age:  71 y.o.  Sex:  male  :  1950  MRN:  [id number]   Visit Number:  [id number]  Admit Date: 10/7/2022   Primary Care Physician:  Provider, No Known    Subjective     Chief complaint: Right Hip Pain    History of presenting illness:      Solitario Mcdaniel is a 71 y.o. male with past medical history significant for arthritis, essential hypertension, and BPH.  Patient presents to [facility] emergency department today for further evaluation of right hip pain s/p mechanical fall approximately 2 days ago. He states that he slid out of his recliner. Initially after fall, patient did not experience pain. However, several hours after the fall, patient states pain became severe. Also reports muscle spasms. He normally ambulates using rolling walker. States that he is now completely unable to ambulate at this time. He states that he had talked over the phone with Dr. Alberto (orthopedic surgery) who recommended presentation to the ED for further evaluation. Externally, right hip does not appear edematous, ecchymotic, or erythematous. Patient does have pain with any small movement. He currently denies chest pain, palpitations, shortness of breath, fever, chills, cough, abdominal pain, n/v/d, and constipation. He has chronic indwelling jorge catheter (states that it was replaced yesterday). Denies symptoms of dysuria or flank pain. Mild to moderate non pitting edema noted to bilateral lower extremities with what appears to be venous stasis dermatitis. States that edema currently appears at baseline. He is stable on room air, denies use of supplemental O2 at home. States that he currently lives with his daughter and son-in-law. Reports compliance with home medication regimen. Discussed plan with patient; voices agreement with no further questions or concerns at this time.     Upon arrival  to the ED, vital signs were temperature 100.5, pulse 83, respirations 16, blood pressure 158/76, SPO2 saturation 97% on  room air.  CMP with glucose 134, AST 61, otherwise unremarkable.  C-reactive protein 21.28.  Lactate not elevated.  Procalcitonin 4.29.  CBC with WBCs 15.83, otherwise unremarkable.  Sed rate 71.  Peripheral blood cultures x2 pending.  COVID-19 and flu A/B swab negative.  CT of the pelvis without contrast revealing large anterior abdominal wall ventral hernia containing nondilated large and small bowel; marked deformity of the right humeral head likely related to chronic AVN or advanced osteoarthritis; no fracture line is evident.  Chest x-ray revealed cardiomegaly, otherwise unremarkable.  MRI of the right hip with and without contrast revealed fairly advanced changes of osteoarthritic disease and joint space collapse of the right hip; subchondral cystic change of both the femoral head and acetabulum are noted.  No characteristic findings of AVN are noted, however may represent sequelae of chronic AVN and subcortical collapse.  Small right hip joint effusion, could be reactive secondary to advanced osteoarthritic change.  In setting of fever and hip pain chronic septic arthritis could also be considered but the morphologic alteration of the joint did not appear to be acute.    Known Emergency Department medications received prior to my evaluation included 4 mg IV morphine, 4 mg IV Zofran, and 2000 mg IV vancomycin. Room location at the time of my evaluation was [facility].  Discussed with attending physician, Laurence Mckinley MD.    ---------------------------------------------------------------------------------------------------------------------   Review of Systems   Constitutional: Positive for activity change and fatigue. Negative for chills and fever.   HENT: Negative for congestion and trouble swallowing.    Respiratory: Negative for cough, shortness of breath and wheezing.    Cardiovascular:  Positive for leg swelling (chronic). Negative for chest pain and palpitations.   Gastrointestinal: Negative for abdominal pain, constipation, diarrhea, nausea and vomiting.   Musculoskeletal: Positive for arthralgias and gait problem.   Neurological: Positive for weakness (chronic, RUE). Negative for dizziness, tremors, seizures, syncope, facial asymmetry, speech difficulty, light-headedness, numbness and headaches.     ---------------------------------------------------------------------------------------------------------------------   No past medical history on file.  No past surgical history on file.  No family history on file.  Social History     Socioeconomic History   • Marital status:      ---------------------------------------------------------------------------------------------------------------------   Allergies:  Patient has no known allergies.  ---------------------------------------------------------------------------------------------------------------------   Home medications:    Medications below are reported home medications pulling from within the system; at this time, these medications have not been reconciled unless otherwise specified and are in the verification process for further verifcation as current home medications.  Medications Prior to Admission   Medication Sig Dispense Refill Last Dose   • HYDROcodone-acetaminophen (NORCO) 5-325 MG per tablet Take 1 tablet by mouth Every 8 (Eight) Hours As Needed for Moderate Pain. 10 tablet 0        Hospital Scheduled Meds:  heparin (porcine), 5,000 Units, Subcutaneous, Q8H  piperacillin-tazobactam, 3.375 g, Intravenous, Once  piperacillin-tazobactam, 3.375 g, Intravenous, Q8H  sodium chloride, 10 mL, Intravenous, Q12H      sodium chloride, 50 mL/hr        Current listed hospital scheduled medications may not yet reflect those currently placed in orders that are signed and held awaiting patient's arrival to floor.    ---------------------------------------------------------------------------------------------------------------------     Objective     Vital Signs:  Temp:  [100.1 °F (37.8 °C)-100.5 °F (38.1 °C)] 100.1 °F (37.8 °C)  Heart Rate:  [83-87] 87  Resp:  [16-18] 18  BP: (151-171)/(70-83) 151/80      10/07/22  1008   Weight: 109 kg (240 lb 4.8 oz)     Body mass index is 42.58 kg/m².  ---------------------------------------------------------------------------------------------------------------------       Physical Exam  Vitals and nursing note reviewed.   Constitutional:       General: He is awake. He is not in acute distress.     Appearance: He is not diaphoretic.      Comments: Currently on room air.   HENT:      Head: Normocephalic and atraumatic.      Mouth/Throat:      Mouth: Mucous membranes are moist.      Pharynx: Oropharynx is clear.   Eyes:      Extraocular Movements: Extraocular movements intact.      Conjunctiva/sclera: Conjunctivae normal.   Cardiovascular:      Rate and Rhythm: Normal rate and regular rhythm.      Pulses: Normal pulses.           Dorsalis pedis pulses are 2+ on the right side and 2+ on the left side.      Heart sounds: Normal heart sounds. No murmur heard.    No friction rub.      Comments: Chronic appearing edema to BLE; patient states appears at baseline. Non pitting.   Pulmonary:      Effort: Pulmonary effort is normal. No accessory muscle usage, respiratory distress or retractions.      Breath sounds: Normal breath sounds. No wheezing, rhonchi or rales.   Abdominal:      General: Bowel sounds are normal. There is no distension.      Palpations: Abdomen is soft.      Tenderness: There is no abdominal tenderness. There is no guarding.      Hernia: A hernia is present.      Comments: Large hernia observed, lower left/central abdomen.   Genitourinary:     Penis: Normal.       Comments: Chronic indwelling jorge catheter in place; states it was changed out yesterday (10/6).  Musculoskeletal:       Cervical back: Neck supple. No rigidity.      Right lower le+ Edema present.      Left lower le+ Edema present.   Skin:     General: Skin is warm and dry.      Capillary Refill: Capillary refill takes 2 to 3 seconds.      Findings: Erythema (chronic, BLE) present.      Comments: BLE dry/flaky.   Neurological:      Mental Status: He is alert and oriented to person, place, and time.      Cranial Nerves: No facial asymmetry.      Sensory: Sensation is intact.      Motor: Weakness (Chronic, RUE) present. No tremor.      Comments: Weakness to right upper extremity; patient states has been present since he had Covid. Hospitalized at [facility] at that time and patient states stroke was ruled out.    Psychiatric:         Attention and Perception: Attention normal.         Mood and Affect: Mood normal.         Speech: Speech normal.         Behavior: Behavior normal. Behavior is cooperative.         Thought Content: Thought content normal.         Cognition and Memory: Cognition normal.       ---------------------------------------------------------------------------------------------------------------------  EKG:  Appearing normal sinus rhythm without evidence of ischemia. Pending cardiology interpretation.   ---------------------------------------------------------------------------------------------------------------------   Results from last 7 days   Lab Units 10/07/22  1138 10/07/22  1045 10/06/22  0753   CRP mg/dL 21.28*  --   --    LACTATE mmol/L  --  0.9  --    WBC 10*3/mm3  --  15.83* 13.23*   HEMOGLOBIN g/dL  --  13.2 13.1   HEMATOCRIT %  --  38.5 38.8   MCV fL  --  87.1 88.2   MCHC g/dL  --  34.3 33.8   PLATELETS 10*3/mm3  --  203 179   INR   --   --  1.02         Results from last 7 days   Lab Units 10/07/22  1138 10/06/22  0753   SODIUM mmol/L 141 141   POTASSIUM mmol/L 4.3 4.4   CHLORIDE mmol/L 103 105   CO2 mmol/L 23.3 23.2   BUN mg/dL 23 32*   CREATININE mg/dL 1.15 1.38*   CALCIUM mg/dL 9.4  9.2   GLUCOSE mg/dL 134* 166*   ALBUMIN g/dL 3.61 3.80   BILIRUBIN mg/dL 0.9 0.9   ALK PHOS U/L 70 78   AST (SGOT) U/L 61* 100*   ALT (SGPT) U/L 28 33   Estimated Creatinine Clearance: 63.8 mL/min (by C-G formula based on SCr of 1.15 mg/dL).  No results found for: AMMONIA          No results found for: HGBA1C  No results found for: TSH, FREET4  No results found for: PREGTESTUR, PREGSERUM, HCG, HCGQUANT  Pain Management Panel    There is no flowsheet data to display.           ---------------------------------------------------------------------------------------------------------------------  Imaging Results (Last 7 Days)     Procedure Component Value Units Date/Time    MRI Hip Right With & Without Contrast [[id number]] Collected: 10/07/22 1345     Updated: 10/07/22 1350    Narrative:      EXAM:    MR Right Lower Extremity Without and With Intravenous Contrast, Hip     EXAM DATE:    10/7/2022 1:03 PM     CLINICAL HISTORY:    Severe right hip pain/leukocytosis/fever/inability to walk     TECHNIQUE:    Multiplanar magnetic resonance images of the right hip without and  with intravenous contrast.     COMPARISON:    10/06/2022     FINDINGS:       BONES/JOINTS:  Again noted are fairly advanced changes of  osteoarthritic change and joint space collapse of the right hip.  Subchondral cystic change of both the femoral head and acetabulum are  noted. While no characteristic findings of AVN are noted this may  represent sequelae of chronic AVN and subcortical collapse.  In setting  of fever and hip pain chronic septic arthritis could also be considered  but the morphologic alteration of the joint do not appear to be acute.   No dislocation.       Impression:      1.  Again noted are fairly advanced changes of osteoarthritic change and  joint space collapse of the right hip. Subchondral cystic change of both  the femoral head and acetabulum are noted. While no characteristic  findings of AVN are noted this may represent  sequelae of chronic AVN and  subcortical collapse.  2.  Small right hip joint effusion that could be reactive secondary to  advanced osteoarthritic change.  3.  In setting of fever and hip pain chronic septic arthritis could also  be considered but the morphologic alteration of the joint do not appear  to be acute.     This report was finalized on 10/7/2022 1:47 PM by Dr. Moiz Lobo MD.       XR Chest AP [[id number]] Collected: 10/07/22 1124     Updated: 10/07/22 1140    Narrative:      EXAM:    XR Chest, 1 View     EXAM DATE:    10/7/2022 10:31 AM     CLINICAL HISTORY:    fever     TECHNIQUE:    Frontal view of the chest.     COMPARISON:    No relevant prior studies available.     FINDINGS:    LUNGS:  Coarsened interstitial markings noted throughout the lungs.    PLEURAL SPACE:  Unremarkable.  No pneumothorax.    HEART:  Cardiomegaly.    MEDIASTINUM:  Unremarkable.    BONES/JOINTS:  Unremarkable.       Impression:        Cardiomegaly.     This report was finalized on 10/7/2022 11:24 AM by Dr. Moiz Lobo MD.             Last echocardiogram: No previous echo on file.      I have personally reviewed the above radiology images and read the final radiology report on 10/07/22  ---------------------------------------------------------------------------------------------------------------------  Assessment / Plan     Active Hospital Problems    Diagnosis  POA   • Septic hip (HCC) [M00.9]  Yes       ASSESSMENT/PLAN:  -Sepsis criteria met on admission with WBC>12 and C-RP elevation likely 2/2 acute cystitis  -Initial concern for septic arthritis of the right hip  -C-RP 21.28, WBCs 15.83, and procalcitonin 4.29 on admission.  -Urinalysis with + nitrites, 1+ leukocytes, 31-50 WBC's and 4+ bacteria.  -Urine culture added, pending.   -Peripheral blood cultures x2 pending.  -Received IV vancomycin and zosyn initially.  -Plan for monotherapy with IV rocephin since sepsis most likely secondary to UTI.  -CT pelvis without  contrast revealed marked deformity of the right humeral head likely related to chronic avascular necrosis or advanced osteoarthritis. No fracture.  -MRI of right hip obtained revealing advanced osteoarthritic changes, joint space collapse. No characteristic findings of acute AVN noted; possible chronic AVN and subcortical collapse. Also noted small right hip joint effusion.  -Gentle IV hydration with NS @ 50 ml/hr.   -Pain control with PRN Percocet.   -Will add PRN Flexeril for muscle spasms.  -Consult PT/OT.   -Afebrile; closely monitor VS per hospital policy.  -Tylenol as needed for fever or mild pain.   -Repeat CBC in the a.m.     -Essential hypertension  -BP appears controlled currently.  -Plan to resume home antihypertensive regimen once reconciled per pharmacy with appropriate holding parameters to prevent hypotension and/or bradycardia.   -Closely monitor BP per hospital protocol, titrate medications as necessary.    -BPH  -Urinary retention  -Chronic indwelling jorge in place, draining light yellow urine.  -Replaced yesterday (10/6) per patient report.   -Denies symptoms of dysuria or flank pain on exam.   -Catheter care per protocol.       ----------  -DVT prophylaxis: Subcutaneous heparin.  -Activity: As tolerated; fall precautions.  -Expected length of stay:   INPATIENT status due to the need for care which can only be reasonably provided in an hospital setting such as aggressive/expedited ancillary services and/or consultation services, the necessity for IV medications, close physician monitoring and/or the possible need for procedures.  In such, I feel patient's risk for adverse outcomes and need for care warrant INPATIENT evaluation and predict the patient's care encounter to likely last beyond 2 midnights.  -Disposition pending clinical course.    High risk secondary to sepsis on admission likely 2/2 acute cystitis in setting of chronic indwelling jorge catheter.           Tania Campos, APRN    10/07/22  16:18 EDT  Pager #[phone]  ---------------------------------------------------------------------------------------------------------------------

## 2022-10-07 NOTE — ED PROVIDER NOTES
Subjective     Hip Pain  Location:  Right hip pain. Patient had fall 2 days ago.    Quality:  Patient reports severe sharp pain at times and inability to walk  Severity:  Severe  Onset quality:  Sudden  Duration:  2 days  Timing:  Constant  Progression:  Worsening  Chronicity:  Recurrent  Context:  Was told to come to ER by ortho  Relieved by:  Nothing  Worsened by:  Movement  Ineffective treatments:  Rest  Associated symptoms: no chest pain, no congestion, no fatigue, no headaches, no myalgias, no shortness of breath, no sore throat and no wheezing    Risk factors:  Hip pain      Review of Systems   Constitutional: Negative.  Negative for fatigue.   HENT: Negative.  Negative for congestion and sore throat.    Eyes: Negative.    Respiratory: Negative.  Negative for shortness of breath and wheezing.    Cardiovascular: Negative.  Negative for chest pain.   Gastrointestinal: Negative.    Endocrine: Negative.    Genitourinary: Negative.    Musculoskeletal: Negative.  Negative for myalgias.   Skin: Negative.    Allergic/Immunologic: Negative.    Neurological: Negative.  Negative for headaches.   Hematological: Negative.    Psychiatric/Behavioral: Negative.        Past Medical History:   Diagnosis Date   • Arthritis    • BPH (benign prostatic hyperplasia)    • Hypertension    • Osteoarthritis        No Known Allergies    Past Surgical History:   Procedure Laterality Date   • COLONOSCOPY     • TONSILLECTOMY     • VASECTOMY         No family history on file.    Social History     Socioeconomic History   • Marital status:    Tobacco Use   • Smoking status: Never   Substance and Sexual Activity   • Alcohol use: Never   • Drug use: Never   • Sexual activity: Not Currently           Objective   Physical Exam  Vitals and nursing note reviewed.   Constitutional:       Appearance: He is well-developed.   HENT:      Head: Normocephalic.      Right Ear: External ear normal.      Left Ear: External ear normal.   Eyes:       Conjunctiva/sclera: Conjunctivae normal.      Pupils: Pupils are equal, round, and reactive to light.   Cardiovascular:      Rate and Rhythm: Normal rate and regular rhythm.      Heart sounds: Normal heart sounds.   Pulmonary:      Effort: Pulmonary effort is normal.      Breath sounds: Normal breath sounds.   Abdominal:      General: Bowel sounds are normal.      Palpations: Abdomen is soft.   Musculoskeletal:      Cervical back: Normal range of motion and neck supple.      Right hip: Tenderness present. Decreased range of motion.   Skin:     General: Skin is warm and dry.      Capillary Refill: Capillary refill takes less than 2 seconds.   Neurological:      Mental Status: He is alert and oriented to person, place, and time.   Psychiatric:         Behavior: Behavior normal.         Thought Content: Thought content normal.         Procedures           ED Course                                           MDM    Final diagnoses:   Septic hip (HCC)       ED Disposition  ED Disposition     ED Disposition   Decision to Admit    Condition   --    Comment   Level of Care: Med/Surg [1]   Diagnosis: Septic hip (HCC) [283191]   Certification: I Certify That Inpatient Hospital Services Are Medically Necessary For Greater Than 2 Midnights               Clara Hyatt, APRN  [address] [id number]  [phone]               Medication List      No changes were made to your prescriptions during this visit.          Marvin Crews, APRN  10/10/22 1126

## 2022-10-07 NOTE — PLAN OF CARE
Goal Outcome Evaluation:           Progress: no change  Outcome Evaluation: Pt arrived to the floor A&O. Pt has been pleasant. VSS. No acute changes at this time. Will continue plan of care.

## 2022-10-08 LAB
ANION GAP SERPL CALCULATED.3IONS-SCNC: 8.7 MMOL/L (ref 5–15)
BACTERIA BLD CULT: ABNORMAL
BACTERIA ID TEST ISLT QL CULT: ABNORMAL
BOTTLE TYPE: ABNORMAL
BUN SERPL-MCNC: 21 MG/DL (ref 8–23)
BUN/CREAT SERPL: 17.6 (ref 7–25)
CALCIUM SPEC-SCNC: 8.5 MG/DL (ref 8.6–10.5)
CHLORIDE SERPL-SCNC: 104 MMOL/L (ref 98–107)
CO2 SERPL-SCNC: 24.3 MMOL/L (ref 22–29)
CREAT SERPL-MCNC: 1.19 MG/DL (ref 0.76–1.27)
DEPRECATED RDW RBC AUTO: 49.6 FL (ref 37–54)
EGFRCR SERPLBLD CKD-EPI 2021: 64.9 ML/MIN/1.73
ERYTHROCYTE [DISTWIDTH] IN BLOOD BY AUTOMATED COUNT: 15.1 % (ref 12.3–15.4)
GLUCOSE SERPL-MCNC: 118 MG/DL (ref 65–99)
HCT VFR BLD AUTO: 34.2 % (ref 37.5–51)
HGB BLD-MCNC: 11.3 G/DL (ref 13–17.7)
MCH RBC QN AUTO: 29.6 PG (ref 26.6–33)
MCHC RBC AUTO-ENTMCNC: 33 G/DL (ref 31.5–35.7)
MCV RBC AUTO: 89.5 FL (ref 79–97)
PLATELET # BLD AUTO: 172 10*3/MM3 (ref 140–450)
PMV BLD AUTO: 10.3 FL (ref 6–12)
POTASSIUM SERPL-SCNC: 4.1 MMOL/L (ref 3.5–5.2)
RBC # BLD AUTO: 3.82 10*6/MM3 (ref 4.14–5.8)
SODIUM SERPL-SCNC: 137 MMOL/L (ref 136–145)
WBC NRBC COR # BLD: 13.2 10*3/MM3 (ref 3.4–10.8)

## 2022-10-08 PROCEDURE — 25010000002 ERTAPENEM PER 500 MG: Performed by: INTERNAL MEDICINE

## 2022-10-08 PROCEDURE — 25010000002 HEPARIN (PORCINE) PER 1000 UNITS: Performed by: INTERNAL MEDICINE

## 2022-10-08 PROCEDURE — 80048 BASIC METABOLIC PNL TOTAL CA: CPT | Performed by: INTERNAL MEDICINE

## 2022-10-08 PROCEDURE — 85027 COMPLETE CBC AUTOMATED: CPT | Performed by: INTERNAL MEDICINE

## 2022-10-08 PROCEDURE — 99232 SBSQ HOSP IP/OBS MODERATE 35: CPT

## 2022-10-08 PROCEDURE — 25010000002 CEFTRIAXONE PER 250 MG: Performed by: INTERNAL MEDICINE

## 2022-10-08 RX ORDER — AMLODIPINE BESYLATE 10 MG/1
10 TABLET ORAL DAILY
Status: DISCONTINUED | OUTPATIENT
Start: 2022-10-08 | End: 2022-10-20 | Stop reason: HOSPADM

## 2022-10-08 RX ORDER — ACETAMINOPHEN 325 MG/1
650 TABLET ORAL EVERY 8 HOURS PRN
Status: DISCONTINUED | OUTPATIENT
Start: 2022-10-08 | End: 2022-10-08 | Stop reason: SDUPTHER

## 2022-10-08 RX ORDER — TAMSULOSIN HYDROCHLORIDE 0.4 MG/1
0.4 CAPSULE ORAL DAILY
Status: DISCONTINUED | OUTPATIENT
Start: 2022-10-08 | End: 2022-10-20 | Stop reason: HOSPADM

## 2022-10-08 RX ORDER — PRENATAL VIT/IRON FUM/FOLIC AC 27MG-0.8MG
1 TABLET ORAL DAILY
Status: DISCONTINUED | OUTPATIENT
Start: 2022-10-08 | End: 2022-10-20 | Stop reason: HOSPADM

## 2022-10-08 RX ORDER — CYCLOBENZAPRINE HCL 10 MG
5 TABLET ORAL 3 TIMES DAILY PRN
Status: DISCONTINUED | OUTPATIENT
Start: 2022-10-08 | End: 2022-10-20 | Stop reason: HOSPADM

## 2022-10-08 RX ADMIN — Medication 10 ML: at 09:08

## 2022-10-08 RX ADMIN — HEPARIN SODIUM 5000 UNITS: 5000 INJECTION INTRAVENOUS; SUBCUTANEOUS at 21:25

## 2022-10-08 RX ADMIN — OXYCODONE HYDROCHLORIDE AND ACETAMINOPHEN 1 TABLET: 5; 325 TABLET ORAL at 21:25

## 2022-10-08 RX ADMIN — PRENATAL VIT W/ FE FUMARATE-FA TAB 27-0.8 MG 1 TABLET: 27-0.8 TAB at 10:53

## 2022-10-08 RX ADMIN — ERTAPENEM SODIUM 1 G: 1 INJECTION, POWDER, LYOPHILIZED, FOR SOLUTION INTRAMUSCULAR; INTRAVENOUS at 11:01

## 2022-10-08 RX ADMIN — HEPARIN SODIUM 5000 UNITS: 5000 INJECTION INTRAVENOUS; SUBCUTANEOUS at 14:58

## 2022-10-08 RX ADMIN — OXYCODONE HYDROCHLORIDE AND ACETAMINOPHEN 1 TABLET: 5; 325 TABLET ORAL at 05:28

## 2022-10-08 RX ADMIN — Medication 5000 UNITS: at 10:53

## 2022-10-08 RX ADMIN — Medication 10 ML: at 21:25

## 2022-10-08 RX ADMIN — AMLODIPINE BESYLATE 10 MG: 10 TABLET ORAL at 10:53

## 2022-10-08 RX ADMIN — Medication 5 MG: at 21:25

## 2022-10-08 RX ADMIN — METOPROLOL TARTRATE 25 MG: 25 TABLET, FILM COATED ORAL at 10:53

## 2022-10-08 RX ADMIN — METOPROLOL TARTRATE 25 MG: 25 TABLET, FILM COATED ORAL at 21:25

## 2022-10-08 RX ADMIN — HEPARIN SODIUM 5000 UNITS: 5000 INJECTION INTRAVENOUS; SUBCUTANEOUS at 05:28

## 2022-10-08 RX ADMIN — SODIUM CHLORIDE 50 ML/HR: 9 INJECTION, SOLUTION INTRAVENOUS at 14:59

## 2022-10-08 RX ADMIN — SODIUM CHLORIDE 2 G: 900 INJECTION INTRAVENOUS at 09:08

## 2022-10-08 RX ADMIN — TAMSULOSIN HYDROCHLORIDE 0.4 MG: 0.4 CAPSULE ORAL at 10:53

## 2022-10-08 NOTE — CONSULTS
Referring Provider: KANDICE Foster  Reason for Consultation: Right hip pain, severe osteoarthritis/AVN    Patient Care Team:  Provider, No Known as PCP - General    Chief complaint right hip pain  Subjective   Lying in bed, no acute distress.  History of present illness: History of Present Illness  71-year-old male that is currently admitted for severe right hip pain, impaired mobilization and urinary tract infection.  The patient states he has had impaired mobility for quite some time, although he seems to think this was related to his knee arthritis and not his hip.  His hip only began hurting recently..  He describes a fall on Monday that progressively, severely impacted his ability to ambulate.  Typically he states he is able to ambulate short distances with use of a rolling walker but since the fall he has been unable to ambulate.  At this time he also complains of fever he states that he recently had his Martin catheter changed and he has had burning and blood in his urine since this recent catheterization.    Review of Systems  Review of Systems   Constitutional: Positive for activity change, chills, fatigue and fever.   HENT: Negative.    Eyes: Negative.    Respiratory: Negative.    Cardiovascular: Negative.    Gastrointestinal: Negative.    Endocrine: Negative.    Genitourinary: Positive for difficulty urinating.   Musculoskeletal: Positive for arthralgias and gait problem.   Skin: Negative.    Neurological: Positive for weakness.   Hematological: Negative.    Psychiatric/Behavioral: Negative.         History  Past Medical History:   Diagnosis Date   • Arthritis    • BPH (benign prostatic hyperplasia)    • Hypertension    • Osteoarthritis    ,   Past Surgical History:   Procedure Laterality Date   • COLONOSCOPY     • TONSILLECTOMY     • VASECTOMY     , No family history on file.,   Social History     Tobacco Use   • Smoking status: Never   Substance Use Topics   • Alcohol use: Never   • Drug use:  Never   ,   Medications Prior to Admission   Medication Sig Dispense Refill Last Dose   • acetaminophen (TYLENOL) 650 MG 8 hr tablet Take 650 mg by mouth Every 8 (Eight) Hours As Needed for Mild Pain or Moderate Pain.   Past Week at Unknown time   • amLODIPine (NORVASC) 10 MG tablet Take 10 mg by mouth Daily.   10/7/2022 at Unknown time   • Cholecalciferol (Vitamin D) 50 MCG (2000 UT) tablet Take 2,000 Units by mouth Daily.   10/7/2022 at Unknown time   • Diclofenac Sodium (VOLTAREN) 1 % gel gel Apply 4 g topically to the appropriate area as directed 4 (Four) Times a Day As Needed (arthritis pain).   Past Week at Unknown time   • hydrALAZINE (APRESOLINE) 50 MG tablet Take 50 mg by mouth 3 (Three) Times a Day.   10/7/2022 at Unknown time   • metoprolol tartrate (LOPRESSOR) 25 MG tablet Take 25 mg by mouth 2 (Two) Times a Day.   10/7/2022 at Unknown time   • Prenatal Vit-Fe Fumarate-FA (prenatal vitamin 27-0.8) 27-0.8 MG tablet tablet Take 1 tablet by mouth Daily.   10/7/2022 at Unknown time   • tamsulosin (FLOMAX) 0.4 MG capsule 24 hr capsule Take 1 capsule by mouth Daily.   10/7/2022 at Unknown time   • HYDROcodone-acetaminophen (NORCO) 5-325 MG per tablet Take 1 tablet by mouth Every 8 (Eight) Hours As Needed for Moderate Pain. 10 tablet 0    , Scheduled Meds:  cefTRIAXone, 2 g, Intravenous, Q24H  heparin (porcine), 5,000 Units, Subcutaneous, Q8H  sodium chloride, 10 mL, Intravenous, Q12H    , Continuous Infusions:  sodium chloride, 50 mL/hr, Last Rate: 50 mL/hr (10/07/22 1728)    , PRN Meds:  •  acetaminophen  •  melatonin  •  oxyCODONE-acetaminophen  •  [COMPLETED] Insert peripheral IV **AND** sodium chloride  •  sodium chloride and Allergies:  Patient has no known allergies.    Objective     Vital Signs   Temp:  [97.5 °F (36.4 °C)-100.5 °F (38.1 °C)] 97.5 °F (36.4 °C)  Heart Rate:  [67-90] 87  Resp:  [16-18] 16  BP: (120-171)/(60-84) 169/84    Physical Exam:   Physical Exam  Constitutional:       General: He is  not in acute distress.     Appearance: He is obese.   HENT:      Head: Normocephalic.      Mouth/Throat:      Mouth: Mucous membranes are moist.   Cardiovascular:      Rate and Rhythm: Normal rate.      Pulses: Normal pulses.   Pulmonary:      Effort: Pulmonary effort is normal. No respiratory distress.   Genitourinary:     Comments: Indwelling Martin catheter noted  Musculoskeletal:         General: Tenderness present.      Cervical back: Normal range of motion.      Right lower leg: Edema present.      Left lower leg: Edema present.      Comments: No range of motion tolerated to right hip.  Positive pedal pulse positive dorsi plantarflexion   Skin:     General: Skin is warm and dry.   Neurological:      General: No focal deficit present.      Mental Status: He is alert.   Psychiatric:         Mood and Affect: Mood normal.         Behavior: Behavior normal.       On physical exam today he is unable to tolerate even touching the skin surrounding his hip.  This is out of proportion to clinical exam, with no signs of apparent swelling about his right hip.  Results Review:   I reviewed the patient's new clinical results.  WBC: 13.20  H/H: 11.3/34.2, UA: Trace ketones, small blood, positive nitrite, small leukocyte.   CT of the pelvis without contrast: Avascular necrosis versus severe osteoarthritis with femoral head deformity.      Assessment & Plan         Septic hip (HCC)  Signs of sepsis most likely related to underlying urinary tract infection.  CT of the pelvis per report with advanced osteoarthritis versus avascular necrosis there is severe deformity of the femoral head.  At this time the patient is being treated for a urinary tract infection.  The MRI of the right hip and pelvis was also reviewed, which showed evidence of destructive osteoarthritis and subchondral sclerosis as well as cystic changes and some collapse of the femoral head.    Clinically the patient does not appear to be septic, and is able to have  a conversation without any signs of distress.  His clinical exam seems to be way out of proportion to what I am able to visualize on x-ray, CT as well as MRI of the right hip.  The changes to his femoral head and acetabulum are clearly chronic in nature, and there is no way that these destructive changes have progressed so rapidly within a very short period of time.    I explained to the patient that he is very high risk to consider any surgical treatment including a total hip arthroplasty for his right hip with the indwelling catheter, the chronic venous stasis, and his obesity.  I do think that any consideration for surgical treatment for an elective procedure such as a total hip arthroplasty should be done after patient optimization which is clearly not the case on this admission.    If there is continued concerns for the possibility of septic arthritis to the right hip, I do think a CT-guided aspirate is the best option to determine if this is indeed the clinical scenario.  I do not see any edema within the femoral neck, that would indicate a femoral neck fracture.    I will continue to follow the patient.          KANDICE Carrillo  10/08/22  08:06 EDT

## 2022-10-08 NOTE — PLAN OF CARE
Goal Outcome Evaluation:              Outcome Evaluation: Patient alert and oriented, rested in bed today. Complains of tenderness in right hip with movement or if touched. Will monitor.

## 2022-10-08 NOTE — PROGRESS NOTES
Patient Identification:  Name:  Solitario Mcdaniel  Age:  71 y.o.  Sex:  male  :  1950  MRN:  [id number]  Visit Number:  [id number]  Primary Care Provider:  Provider, No Known    Length of stay:  1    Subjective/Interval History/Consultants/Procedures     Chief complaint: Right Hip pain s/p mechanical fall at home prior to admission    Subjective/Interval History:  Solitario Mcdaniel is our 71 y.o. male patient admitted on 10/7/2022 with sepsis likely secondary to acute cystitis in setting of chronic indwelling jorge catheter. He has pMH significant for arthritis, essential hypertension, and BPH. For further and complete admitting details, please see admission H&P.    He was admitted to the [facility] floor for further monitoring, evaluation, and treatment.  Initial concern was that sepsis possibly related to septic right hip in setting of severe right hip pain with new inability to bear weight on right lower extremity.  Imaging revealed advanced osteoarthritic changes and joint space collapse of the right hip.  There was subchondral cystic change of both the femoral head and acetabulum noted.  No characteristic findings of acute AVN; findings may represent sequela of chronic AVN and subcortical collapse.  There also was a small right hip joint effusion, likely reactive secondary to advanced osteoarthritic change.  Orthopedic surgery was consulted for further evaluation.  PT also consulted for discharge placement assessment, as he currently lives with his daughter and son-in-law who work full-time and are unable to provide assistance for the patient at home.  Patient met sepsis criteria on admission with WBCs greater than 12 and CRP elevation.  Upon exam, it was evident that patient had chronic indwelling Jorge catheter.  He states that Jorge catheter was replaced on 10/6.  Urinalysis obtained, grossly abnormal.  Urine culture growing gram-negative bacilli.  Peripheral blood cultures obtained in the ED also with 2/2  bottles growing Klebsiella pneumoniae and ESBL.  Have consulted infectious disease for further evaluation and assistance.  This patient received IV vancomycin and Zosyn in the ED.  Currently on IV Invanz per attending.  Infection markers have decreased.  The patient has remained afebrile with stable vital signs.  Pain currently being managed with as needed Percocet.  Awaiting further recs.    Procedures/Imaging:  · Chest xray  · Xray of right hip/pelvis  · CT pelvis without contrast  · MRI right hip    Consults:  · Orthopedic Surgery  · Infectious Disease      On today's exam, the patient was resting in bed with no signs or symptoms of acute distress noted.  He still reports inability to bear weight on the right lower extremity with severe pain with any movement.  Patient denies any dysuria symptoms, chills, and bladder pain or pressure.  Also denies chest pain or shortness of breath.  Orthopedic surgery has been consulted for further evaluation of hip pain.  Have discussed this with patient; voices agreement with no further questions or concerns at this time.     No acute events reported overnight. Room location at the time of evaluation was 338.  Discussed with attending physician, Laurence Mckinley MD.   ----------------------------------------------------------------------------------------------------------------------  [facility] Meds:  amLODIPine, 10 mg, Oral, Daily  ertapenem, 1 g, Intravenous, Q24H  heparin (porcine), 5,000 Units, Subcutaneous, Q8H  metoprolol tartrate, 25 mg, Oral, BID  prenatal vitamin 27-0.8, 1 tablet, Oral, Daily  sodium chloride, 10 mL, Intravenous, Q12H  tamsulosin, 0.4 mg, Oral, Daily  vitamin D3, 5,000 Units, Oral, Daily      sodium chloride, 50 mL/hr, Last Rate: 50 mL/hr (10/07/22 6444)      ----------------------------------------------------------------------------------------------------------------------      Objective     Vital Signs:  Temp:  [97.5 °F (36.4 °C)-100.1  °F (37.8 °C)] 97.9 °F (36.6 °C)  Heart Rate:  [67-90] 85  Resp:  [16-18] 16  BP: (120-169)/(60-84) 143/69      10/07/22  1008 10/07/22  1622   Weight: 109 kg (240 lb 4.8 oz) 110 kg (242 lb 6.4 oz)     Body mass index is 42.94 kg/m².    Intake/Output Summary (Last 24 hours) at 10/8/2022 1404  Last data filed at 10/8/2022 1200  Gross per 24 hour   Intake 1020 ml   Output 1600 ml   Net -580 ml     I/O this shift:  In: 480 [P.O.:480]  Out: -   Diet Regular  ----------------------------------------------------------------------------------------------------------------------    Physical Exam  Vitals and nursing note reviewed.   Constitutional:       General: He is awake. He is not in acute distress.     Appearance: He is not diaphoretic.      Comments: Room air.   HENT:      Head: Normocephalic and atraumatic.      Mouth/Throat:      Mouth: Mucous membranes are moist.      Pharynx: Oropharynx is clear.   Eyes:      Extraocular Movements: Extraocular movements intact.   Cardiovascular:      Rate and Rhythm: Normal rate and regular rhythm.      Pulses: Normal pulses.           Dorsalis pedis pulses are 2+ on the right side and 2+ on the left side.      Heart sounds: Normal heart sounds. No murmur heard.    No friction rub.   Pulmonary:      Effort: Pulmonary effort is normal. No accessory muscle usage, respiratory distress or retractions.      Breath sounds: Normal breath sounds. No wheezing, rhonchi or rales.   Abdominal:      General: Bowel sounds are normal. There is no distension.      Palpations: Abdomen is soft.      Tenderness: There is no abdominal tenderness. There is no guarding.   Genitourinary:     Comments: Martin catheter remaining in place.  Musculoskeletal:         General: Tenderness present.      Cervical back: Neck supple. No rigidity.      Right lower le+ Edema present.      Left lower le+ Edema present.   Skin:     General: Skin is warm and dry.      Capillary Refill: Capillary refill takes 2 to  3 seconds.   Neurological:      Mental Status: He is alert and oriented to person, place, and time. Mental status is at baseline.      Cranial Nerves: No facial asymmetry.      Sensory: Sensation is intact. No sensory deficit.      Motor: Weakness (chronic RUE weakness) present. No tremor.      Gait: Gait abnormal.   Psychiatric:         Attention and Perception: Attention normal.         Mood and Affect: Mood normal.         Speech: Speech normal.         Behavior: Behavior normal. Behavior is cooperative.         Thought Content: Thought content normal.       ----------------------------------------------------------------------------------------------------------------------      Results from last 7 days   Lab Units 10/08/22  0055 10/07/22  1138 10/07/22  1045 10/06/22  0753   CRP mg/dL  --  21.28*  --   --    LACTATE mmol/L  --   --  0.9  --    WBC 10*3/mm3 13.20*  --  15.83* 13.23*   HEMOGLOBIN g/dL 11.3*  --  13.2 13.1   HEMATOCRIT % 34.2*  --  38.5 38.8   MCV fL 89.5  --  87.1 88.2   MCHC g/dL 33.0  --  34.3 33.8   PLATELETS 10*3/mm3 172  --  203 179   INR   --   --   --  1.02         Results from last 7 days   Lab Units 10/08/22  0055 10/07/22  1138 10/06/22  0753   SODIUM mmol/L 137 141 141   POTASSIUM mmol/L 4.1 4.3 4.4   CHLORIDE mmol/L 104 103 105   CO2 mmol/L 24.3 23.3 23.2   BUN mg/dL 21 23 32*   CREATININE mg/dL 1.19 1.15 1.38*   CALCIUM mg/dL 8.5* 9.4 9.2   GLUCOSE mg/dL 118* 134* 166*   ALBUMIN g/dL  --  3.61 3.80   BILIRUBIN mg/dL  --  0.9 0.9   ALK PHOS U/L  --  70 78   AST (SGOT) U/L  --  61* 100*   ALT (SGPT) U/L  --  28 33   Estimated Creatinine Clearance: 62 mL/min (by C-G formula based on SCr of 1.19 mg/dL).  No results found for: AMMONIA      Blood Culture   Date Value Ref Range Status   10/07/2022 Abnormal Stain (C)  Preliminary   10/07/2022 Abnormal Stain (C)  Preliminary     Urine Culture   Date Value Ref Range Status   10/07/2022 >100,000 CFU/mL Gram Negative Bacilli (A)  Preliminary      No results found for: WOUNDCX  No results found for: STOOLCX  ----------------------------------------------------------------------------------------------------------------------  Imaging Results (Last 24 Hours)     ** No results found for the last 24 hours. **        ----------------------------------------------------------------------------------------------------------------------   I have reviewed the above laboratory values for 10/08/22    Assessment/Plan     Active Hospital Problems    Diagnosis  POA   • Septic hip (HCC) [M00.9]  Yes       ASSESSMENT/PLAN:  -Sepsis criteria met on admission with WBC>12 and C-RP elevation likely 2/2 acute cystitis  -Klebsiella pneumoniae and ESBL bacteremia  -Initial concern for septic arthritis of the right hip, ruled out  -C-RP 21.28, WBCs 15.83, and procalcitonin 4.29 on admission.  -Urinalysis with + nitrites, 1+ leukocytes, 31-50 WBC's and 4+ bacteria.  -Urine culture added, grew gram negative bacilli (likely Klebsiella and ESBL, source of bacteremia).   -Received IV vancomycin and zosyn initially.  -Now on IV Invanz per attending.   -Will order repeat blood cultures today.   -Have consulted ID for further evaluation; greatly appreciate their assistance.   -CT pelvis without contrast revealed marked deformity of the right humeral head likely related to chronic avascular necrosis or advanced osteoarthritis. No fracture.  -MRI of right hip obtained revealing advanced osteoarthritic changes, joint space collapse. No characteristic findings of acute AVN noted; possible chronic AVN and subcortical collapse. Also noted small right hip joint effusion.  -Orthopedic surgery consult placed; greatly appreciate their assistance.   -Gentle IV hydration with NS @ 50 ml/hr.   -Pain control with PRN Percocet.   -PRN Flexeril for muscle spasms.  -PT consult placed.   -Afebrile; closely monitor VS per hospital policy.  -Tylenol as needed for fever or mild pain.   -Repeat CBC and C-RP  in the a.m.      -Essential hypertension  -BP appears controlled currently.  -Continue metoprolol 25 mg BID and Norvasc 10 mg daily with appropriate holding parameters to prevent hypotension and/or bradycardia.   -Closely monitor BP per hospital protocol, titrate medications as necessary.     -BPH  -Urinary retention  -Chronic indwelling jorge in place, draining light yellow urine.  -Replaced on 10/6 per patient report.   -Denies symptoms of dysuria or flank pain on exam.   -Catheter care per protocol.   -Continue Flomax.        -----------  -DVT prophylaxis: Subcutaneous heparin  -Activity: Bedrest  -Disposition plans/anticipated needs: Pending clinical course.  -Diet: Regular  -Home supplemental O2: N/A currently stable on room air.       High risk secondary to sepsis on admission likely 2/2 acute cystitis in setting of chronic indwelling jorge catheter. Now with evidence of bacteremia.         KANDICE Can  10/08/22  14:04 EDT  Pager #[phone]

## 2022-10-08 NOTE — PLAN OF CARE
Goal Outcome Evaluation:  Plan of Care Reviewed With: patient        Progress: no change  Outcome Evaluation: Pt has rested in bed overnight. VSS. Pain well controlled with PO meds. No acute changes overnight.

## 2022-10-09 LAB
ANION GAP SERPL CALCULATED.3IONS-SCNC: 9.8 MMOL/L (ref 5–15)
BACTERIA SPEC AEROBE CULT: ABNORMAL
BUN SERPL-MCNC: 20 MG/DL (ref 8–23)
BUN/CREAT SERPL: 16.1 (ref 7–25)
CALCIUM SPEC-SCNC: 8.7 MG/DL (ref 8.6–10.5)
CHLORIDE SERPL-SCNC: 100 MMOL/L (ref 98–107)
CO2 SERPL-SCNC: 25.2 MMOL/L (ref 22–29)
CREAT SERPL-MCNC: 1.24 MG/DL (ref 0.76–1.27)
CRP SERPL-MCNC: 17.62 MG/DL (ref 0–0.5)
DEPRECATED RDW RBC AUTO: 47.8 FL (ref 37–54)
EGFRCR SERPLBLD CKD-EPI 2021: 61.8 ML/MIN/1.73
ERYTHROCYTE [DISTWIDTH] IN BLOOD BY AUTOMATED COUNT: 14.7 % (ref 12.3–15.4)
GLUCOSE SERPL-MCNC: 120 MG/DL (ref 65–99)
HCT VFR BLD AUTO: 35.8 % (ref 37.5–51)
HGB BLD-MCNC: 12.1 G/DL (ref 13–17.7)
MCH RBC QN AUTO: 29.8 PG (ref 26.6–33)
MCHC RBC AUTO-ENTMCNC: 33.8 G/DL (ref 31.5–35.7)
MCV RBC AUTO: 88.2 FL (ref 79–97)
PLATELET # BLD AUTO: 193 10*3/MM3 (ref 140–450)
PMV BLD AUTO: 10.2 FL (ref 6–12)
POTASSIUM SERPL-SCNC: 4 MMOL/L (ref 3.5–5.2)
QT INTERVAL: 386 MS
QTC INTERVAL: 453 MS
RBC # BLD AUTO: 4.06 10*6/MM3 (ref 4.14–5.8)
SODIUM SERPL-SCNC: 135 MMOL/L (ref 136–145)
WBC NRBC COR # BLD: 12.59 10*3/MM3 (ref 3.4–10.8)

## 2022-10-09 PROCEDURE — 87040 BLOOD CULTURE FOR BACTERIA: CPT | Performed by: INTERNAL MEDICINE

## 2022-10-09 PROCEDURE — 99232 SBSQ HOSP IP/OBS MODERATE 35: CPT

## 2022-10-09 PROCEDURE — 25010000002 HEPARIN (PORCINE) PER 1000 UNITS: Performed by: INTERNAL MEDICINE

## 2022-10-09 PROCEDURE — 99221 1ST HOSP IP/OBS SF/LOW 40: CPT | Performed by: PHYSICIAN ASSISTANT

## 2022-10-09 PROCEDURE — 25010000002 ERTAPENEM PER 500 MG: Performed by: INTERNAL MEDICINE

## 2022-10-09 PROCEDURE — 86140 C-REACTIVE PROTEIN: CPT

## 2022-10-09 PROCEDURE — 80048 BASIC METABOLIC PNL TOTAL CA: CPT

## 2022-10-09 PROCEDURE — 87077 CULTURE AEROBIC IDENTIFY: CPT | Performed by: INTERNAL MEDICINE

## 2022-10-09 PROCEDURE — 85027 COMPLETE CBC AUTOMATED: CPT

## 2022-10-09 RX ADMIN — HEPARIN SODIUM 5000 UNITS: 5000 INJECTION INTRAVENOUS; SUBCUTANEOUS at 15:48

## 2022-10-09 RX ADMIN — Medication 10 ML: at 20:55

## 2022-10-09 RX ADMIN — TAMSULOSIN HYDROCHLORIDE 0.4 MG: 0.4 CAPSULE ORAL at 08:22

## 2022-10-09 RX ADMIN — HEPARIN SODIUM 5000 UNITS: 5000 INJECTION INTRAVENOUS; SUBCUTANEOUS at 05:06

## 2022-10-09 RX ADMIN — ERTAPENEM SODIUM 1 G: 1 INJECTION, POWDER, LYOPHILIZED, FOR SOLUTION INTRAMUSCULAR; INTRAVENOUS at 10:57

## 2022-10-09 RX ADMIN — METOPROLOL TARTRATE 25 MG: 25 TABLET, FILM COATED ORAL at 08:22

## 2022-10-09 RX ADMIN — OXYCODONE HYDROCHLORIDE AND ACETAMINOPHEN 1 TABLET: 5; 325 TABLET ORAL at 17:39

## 2022-10-09 RX ADMIN — HEPARIN SODIUM 5000 UNITS: 5000 INJECTION INTRAVENOUS; SUBCUTANEOUS at 20:55

## 2022-10-09 RX ADMIN — Medication 5000 UNITS: at 08:22

## 2022-10-09 RX ADMIN — AMLODIPINE BESYLATE 10 MG: 10 TABLET ORAL at 08:22

## 2022-10-09 RX ADMIN — OXYCODONE HYDROCHLORIDE AND ACETAMINOPHEN 1 TABLET: 5; 325 TABLET ORAL at 09:52

## 2022-10-09 RX ADMIN — PRENATAL VIT W/ FE FUMARATE-FA TAB 27-0.8 MG 1 TABLET: 27-0.8 TAB at 08:22

## 2022-10-09 RX ADMIN — OXYCODONE HYDROCHLORIDE AND ACETAMINOPHEN 1 TABLET: 5; 325 TABLET ORAL at 05:06

## 2022-10-09 RX ADMIN — SODIUM CHLORIDE 50 ML/HR: 9 INJECTION, SOLUTION INTRAVENOUS at 10:57

## 2022-10-09 RX ADMIN — METOPROLOL TARTRATE 25 MG: 25 TABLET, FILM COATED ORAL at 20:55

## 2022-10-09 RX ADMIN — Medication 10 ML: at 08:22

## 2022-10-09 NOTE — PROGRESS NOTES
Patient Identification:  Name:  Solitario Mcdaniel  Age:  71 y.o.  Sex:  male  :  1950  MRN:  [id number]  Visit Number:  [id number]  Primary Care Provider:  Provider, No Known    Length of stay:  2    Subjective/Interval History/Consultants/Procedures     Chief complaint: Right Hip pain s/p mechanical fall at home prior to admission     Subjective/Interval History:  Solitario Mcdaniel is our 71 y.o. male patient admitted on 10/7/2022 with sepsis likely secondary to acute cystitis in setting of chronic indwelling jorge catheter. He has pMH significant for arthritis, essential hypertension, and BPH. For further and complete admitting details, please see admission H&P.     He was admitted to the [facility] floor for further monitoring, evaluation, and treatment.  Initial concern was that sepsis possibly related to septic right hip in setting of severe right hip pain with new inability to bear weight on right lower extremity.  Imaging revealed advanced osteoarthritic changes and joint space collapse of the right hip.  There was subchondral cystic change of both the femoral head and acetabulum noted.  No characteristic findings of acute AVN; findings may represent sequela of chronic AVN and subcortical collapse.  There also was a small right hip joint effusion, likely reactive secondary to advanced osteoarthritic change.  Orthopedic surgery was consulted for further evaluation.  PT also consulted for discharge placement assessment, as he currently lives with his daughter and son-in-law who work full-time and are unable to provide assistance for the patient at home.  Patient met sepsis criteria on admission with WBCs greater than 12 and CRP elevation.  Upon exam, it was evident that patient had chronic indwelling Jorge catheter.  He states that Jorge catheter was replaced on 10/6.  Urinalysis obtained, grossly abnormal.  Urine culture growing >100K ESBL E. Coli.  Peripheral blood cultures obtained in the ED also with 2/2  bottles growing Klebsiella pneumoniae and ESBL.  Have consulted infectious disease for further evaluation and assistance.  This patient received IV vancomycin and Zosyn in the ED.  Currently on IV Invanz per attending. ID currently agrees with Invanz.  Infection markers have decreased.  The patient has remained afebrile with stable vital signs.  Pain currently being managed with as needed Percocet. Orthopedic surgery currently not recommending total hip arthroplasty.      Procedures/Imaging:  • Chest xray  • Xray of right hip/pelvis  • CT pelvis without contrast  • MRI right hip     Consults:  • Orthopedic Surgery  • Infectious Disease       On today's exam, the patient was resting in bed with no signs or symptoms of acute distress noted. He had just eaten breakfast. He still reports inability to bear weight on the right lower extremity with severe pain during turns/repositioning. Pain is mild at rest. Ortho currently not recommending total hip arthroplasty at this time. Plan discussed with patient; voices agreement with no further questions or concerns.      No acute events reported overnight. Room location at the time of evaluation was [facility].  Discussed with attending physician, Laurence Mckinley MD.   ----------------------------------------------------------------------------------------------------------------------  Current [facility] Meds:  amLODIPine, 10 mg, Oral, Daily  ertapenem, 1 g, Intravenous, Q24H  heparin (porcine), 5,000 Units, Subcutaneous, Q8H  metoprolol tartrate, 25 mg, Oral, BID  prenatal vitamin 27-0.8, 1 tablet, Oral, Daily  sodium chloride, 10 mL, Intravenous, Q12H  tamsulosin, 0.4 mg, Oral, Daily  vitamin D3, 5,000 Units, Oral, Daily      sodium chloride, 50 mL/hr, Last Rate: 50 mL/hr (10/08/22 1459)      ----------------------------------------------------------------------------------------------------------------------      Objective     Vital Signs:  Temp:  [98.3 °F (36.8 °C)-99.2 °F  (37.3 °C)] 98.3 °F (36.8 °C)  Heart Rate:  [63-94] 63  Resp:  [16-18] 16  BP: (101-154)/(57-68) 101/59      10/07/22  1008 10/07/22  1622   Weight: 109 kg (240 lb 4.8 oz) 110 kg (242 lb 6.4 oz)     Body mass index is 42.94 kg/m².    Intake/Output Summary (Last 24 hours) at 10/9/2022 1051  Last data filed at 10/9/2022 0300  Gross per 24 hour   Intake 240 ml   Output 3450 ml   Net -3210 ml     No intake/output data recorded.  Diet Regular  ----------------------------------------------------------------------------------------------------------------------    Physical Exam  Vitals and nursing note reviewed.   Constitutional:       General: He is awake. He is not in acute distress.     Appearance: He is obese. He is not diaphoretic.      Comments: Currently on room air.   HENT:      Head: Normocephalic and atraumatic.      Mouth/Throat:      Mouth: Mucous membranes are moist.      Pharynx: Oropharynx is clear.   Eyes:      Extraocular Movements: Extraocular movements intact.      Conjunctiva/sclera: Conjunctivae normal.   Cardiovascular:      Rate and Rhythm: Normal rate and regular rhythm.      Pulses: Normal pulses.           Dorsalis pedis pulses are 2+ on the right side and 2+ on the left side.      Heart sounds: Normal heart sounds. No murmur heard.    No friction rub.      Comments: Trace edema BLE. Has improved.   Pulmonary:      Effort: Pulmonary effort is normal. No accessory muscle usage, respiratory distress or retractions.      Breath sounds: Normal breath sounds. No wheezing, rhonchi or rales.   Abdominal:      General: Bowel sounds are normal. There is no distension.      Palpations: Abdomen is soft.      Tenderness: There is no abdominal tenderness. There is no guarding.   Genitourinary:     Comments: Martin catheter remaining in place.  Musculoskeletal:         General: Tenderness present.      Cervical back: Neck supple. No rigidity.      Right lower le+ Edema present.      Left lower le+ Edema  present.   Skin:     General: Skin is warm and dry.      Capillary Refill: Capillary refill takes 2 to 3 seconds.   Neurological:      Mental Status: He is alert and oriented to person, place, and time.      Cranial Nerves: No facial asymmetry.      Sensory: Sensation is intact. No sensory deficit.      Motor: Weakness present. No tremor.      Gait: Gait abnormal.      Comments: Chronic RUE weakness.    Psychiatric:         Attention and Perception: Attention normal.         Mood and Affect: Mood normal.         Speech: Speech normal.         Behavior: Behavior normal. Behavior is cooperative.       ----------------------------------------------------------------------------------------------------------------------      Results from last 7 days   Lab Units 10/09/22  0231 10/08/22  0055 10/07/22  1138 10/07/22  1045 10/06/22  0753   CRP mg/dL 17.62*  --  21.28*  --   --    LACTATE mmol/L  --   --   --  0.9  --    WBC 10*3/mm3 12.59* 13.20*  --  15.83* 13.23*   HEMOGLOBIN g/dL 12.1* 11.3*  --  13.2 13.1   HEMATOCRIT % 35.8* 34.2*  --  38.5 38.8   MCV fL 88.2 89.5  --  87.1 88.2   MCHC g/dL 33.8 33.0  --  34.3 33.8   PLATELETS 10*3/mm3 193 172  --  203 179   INR   --   --   --   --  1.02         Results from last 7 days   Lab Units 10/09/22  0231 10/08/22  0055 10/07/22  1138 10/06/22  0753   SODIUM mmol/L 135* 137 141 141   POTASSIUM mmol/L 4.0 4.1 4.3 4.4   CHLORIDE mmol/L 100 104 103 105   CO2 mmol/L 25.2 24.3 23.3 23.2   BUN mg/dL 20 21 23 32*   CREATININE mg/dL 1.24 1.19 1.15 1.38*   CALCIUM mg/dL 8.7 8.5* 9.4 9.2   GLUCOSE mg/dL 120* 118* 134* 166*   ALBUMIN g/dL  --   --  3.61 3.80   BILIRUBIN mg/dL  --   --  0.9 0.9   ALK PHOS U/L  --   --  70 78   AST (SGOT) U/L  --   --  61* 100*   ALT (SGPT) U/L  --   --  28 33   Estimated Creatinine Clearance: 59.5 mL/min (by C-G formula based on SCr of 1.24 mg/dL).  No results found for: AMMONIA      Blood Culture   Date Value Ref Range Status   10/07/2022 Gram Negative  Bacilli (C)  Preliminary   10/07/2022 Gram Negative Bacilli (C)  Preliminary     Urine Culture   Date Value Ref Range Status   10/07/2022 >100,000 CFU/mL Gram Negative Bacilli (A)  Preliminary     ----------------------------------------------------------------------------------------------------------------------  Imaging Results (Last 24 Hours)     ** No results found for the last 24 hours. **        ----------------------------------------------------------------------------------------------------------------------   I have reviewed the above laboratory values for 10/09/22    Assessment/Plan     Active Hospital Problems    Diagnosis  POA   • Septic hip (HCC) [M00.9]  Yes       ASSESSMENT/PLAN:  -Sepsis criteria met on admission with WBC>12 and C-RP elevation likely 2/2 acute ESBL E. Coli UTI  -Klebsiella pneumoniae and ESBL bacteremia  -Initial concern for septic arthritis of the right hip, effectively ruled out  -C-RP 21.28, WBCs 15.83, and procalcitonin 4.29 on admission.  -Urinalysis with + nitrites, 1+ leukocytes, 31-50 WBC's and 4+ bacteria.  -Urine culture added, grew >100K ESBL E. Coli.   -Received IV vancomycin and zosyn initially in ED.  -Currently on Invanz.   -Repeat peripheral blood cultures x2 drawn today; pending.   -ID following; greatly appreciate their assistance.   -CT pelvis without contrast revealed marked deformity of the right humeral head likely related to chronic avascular necrosis or advanced osteoarthritis. No fracture.  -MRI of right hip obtained revealing advanced osteoarthritic changes, joint space collapse. No characteristic findings of acute AVN noted; possible chronic AVN and subcortical collapse. Also noted small right hip joint effusion.  -Orthopedic surgery not currently recommending total hip arthroplasty. Recommended possible CT-guided aspiration if septic hip is suspected, which it currently is not.   -Continue gentle IV hydration with NS @ 50 ml/hr.   -Pain control with PRN  Percocet.   -PRN Flexeril for muscle spasms.  -PT consult has been placed.   -Afebrile; closely monitor VS per hospital policy.  -Tylenol as needed for fever or mild pain.   -Repeat CBC in the a.m.     -Essential hypertension  -BP appears controlled currently.  -Continue metoprolol 25 mg BID and Norvasc 10 mg daily with appropriate holding parameters to prevent hypotension and/or bradycardia.   -Closely monitor BP per hospital protocol, titrate medications as necessary.     -BPH  -Urinary retention  -Chronic indwelling jorge in place, draining light yellow urine.  -Replaced on 10/6 per patient report.   -Denies symptoms of dysuria or flank pain on exam.   -Catheter care per protocol.   -Continue Flomax.         -----------  -DVT prophylaxis: Subcutaneous heparin  -Activity: Bedrest  -Disposition plans/anticipated needs: Pending clinical course.PT consult placed. Will likely require SNF placement. Will consult CM for discharge assistance.   -Diet: Regular  -Home supplemental O2: N/A currently stable on room air.         High risk secondary to sepsis on admission likely 2/2 acute cystitis in setting of chronic indwelling jorge catheter. Now with evidence of bacteremia.         KANDICE Can  10/09/22  10:51 EDT  Pager #[phone]

## 2022-10-09 NOTE — CONSULTS
INFECTIOUS DISEASE CONSULTATION REPORT        Patient Identification:  Name:  Solitario Mcdaniel  Age:  71 y.o.  Sex:  male  :  1950  MRN:  [id number]   Visit Number:  [id number]  Primary Care Physician:  Provider, No Known  Referring Provider: Tania Campos APRN  Reason for consult: Bacteremia       LOS: 2 days        Subjective       Subjective     History of present illness:      Thank you Dr. Avilez for allowing us to participate in the care of your patient.  As you well know, Mr. Solitario Mcdaniel is a 71 y.o. male with past medical history significant for arthritis, essential hypertension, and BPH, who presented to [facility] Emergency Department on 10/7/2022 for right hip pain status post fall.    Today, the patient is sitting up in bed on room air in no apparent distress.  Reports a history of chronic Martin catheter since  after having been hospitalized for COVID-19 with a nursing home stay after that time.  Reports a fall from his recliner a few days ago and since that time patient has had worsening right hip pain with difficulty ambulating.  Patient is typically able to use a walker, but is currently very sensitive to any palpation or movement of the right hip without exterior signs of trauma or infection.  No erythema, edema, warmth, or wound noted.  Martin catheter is in place with dark urine and sediment in the tubing. Dr. Alberto has evaluated the patient and believes imaging findings are related to chronic changes and believes a right hip arthroplasty would be beneficial, but patient is very high risk at this time. Afebrile, no diarrhea.  CRP is improved at 17.62.  WBC is improved at 12.59.  Lactic acid on admission was normal at 0.9.  Urinalysis on 10/7/2022 was positive with 31-50 WBCs and 4+ bacteria.  Urine culture finalized as greater than 100,000 colonies of ESBL E. coli.  Chest x-ray on 10/7/2022 showed cardiomegaly.  Coarsened interstitial markings noted throughout  the lungs.  X-ray of the right hip on 10/6/2022 showed very advanced right hip osteoarthritic change and possibly chronic AVN with deformity of the femoral head contour.  CT pelvis on 10/6/2022 showed there is a large anterior abdominal wall ventral hernia containing nondilated large and small bowel.  Again there is marked deformity of the right humeral head likely related to chronic AVN or advanced osteoarthritis.  No fracture line is evident.  MRI of the right hip on 10/7/2022 showed again noted are fairly advanced changes of osteoarthritic change and joint space collapse of the right hip.  Subchondral cystic change of both the femoral head and acetabulum are noted.  While no characteristic findings of AVN are noted this may represent sequelae of chronic AVN and subcortical collapse.  Small right hip joint effusion that could be reactive secondary to advanced osteoarthritic change.  In setting of fever and hip pain chronic septic arthritis could also be considered but the morphologic alteration of the joint do not appear to be acute.  COVID-19 and flu A/B PCR on 10/7/2022 was negative.  2 out of 2 blood cultures on 10/7/2022 are so far showing gram-negative bacilli with BC ID finalized as ESBL Klebsiella pneumoniae.  Blood cultures on 10/9/2022 are in progress.    Infectious Disease consultation was requested for antimicrobial management.      ---------------------------------------------------------------------------------------------------------------------     Review Of Systems:    Constitutional: no fever, chills and night sweats. No appetite change or unexpected weight change.  Fatigue and generalized weakness.  Eyes: no eye drainage, itching or redness.  HEENT: no mouth sores, dysphagia or nose bleed.  Respiratory: no for shortness of breath, cough or production of sputum.  Cardiovascular: no chest pain, no palpitations, no orthopnea.  Bilateral leg swelling.  Gastrointestinal: no nausea, vomiting or  diarrhea. No abdominal pain, hematemesis or rectal bleeding.  Genitourinary: no dysuria or polyuria.  Hematologic/lymphatic: no lymph node abnormalities, no easy bruising or easy bleeding.  Musculoskeletal: Arthralgias.  Gait problems.  Right hip pain.  Skin: No rash and no itching.  Neurological: no loss of consciousness, no seizure, no headache.  Chronic right upper extremity weakness.  Behavioral/Psych: no depression or suicidal ideation.  Endocrine: no hot flashes.  Immunologic: negative.    ---------------------------------------------------------------------------------------------------------------------     Past Medical History    Past Medical History:   Diagnosis Date   • Arthritis    • BPH (benign prostatic hyperplasia)    • Hypertension    • Osteoarthritis        Past Surgical History    Past Surgical History:   Procedure Laterality Date   • COLONOSCOPY     • TONSILLECTOMY     • VASECTOMY         Family History    No family history on file.    Social History    Social History     Tobacco Use   • Smoking status: Never   Substance Use Topics   • Alcohol use: Never   • Drug use: Never       Allergies    Patient has no known allergies.  ---------------------------------------------------------------------------------------------------------------------     Home Medications:    Prior to Admission Medications     Prescriptions Last Dose Informant Patient Reported? Taking?    acetaminophen (TYLENOL) 650 MG 8 hr tablet Past Week Self Yes Yes    Take 650 mg by mouth Every 8 (Eight) Hours As Needed for Mild Pain or Moderate Pain.    amLODIPine (NORVASC) 10 MG tablet 10/7/2022 Pharmacy Yes Yes    Take 10 mg by mouth Daily.    Cholecalciferol (Vitamin D) 50 MCG (2000 UT) tablet 10/7/2022  Yes Yes    Take 2,000 Units by mouth Daily.    Diclofenac Sodium (VOLTAREN) 1 % gel gel Past Week Pharmacy Yes Yes    Apply 4 g topically to the appropriate area as directed 4 (Four) Times a Day As Needed (arthritis pain).     hydrALAZINE (APRESOLINE) 50 MG tablet 10/7/2022 Pharmacy Yes Yes    Take 50 mg by mouth 3 (Three) Times a Day.    metoprolol tartrate (LOPRESSOR) 25 MG tablet 10/7/2022 Pharmacy Yes Yes    Take 25 mg by mouth 2 (Two) Times a Day.    Prenatal Vit-Fe Fumarate-FA (prenatal vitamin 27-0.8) 27-0.8 MG tablet tablet 10/7/2022 Pharmacy Yes Yes    Take 1 tablet by mouth Daily.    tamsulosin (FLOMAX) 0.4 MG capsule 24 hr capsule 10/7/2022 Pharmacy Yes Yes    Take 1 capsule by mouth Daily.    HYDROcodone-acetaminophen (NORCO) 5-325 MG per tablet  Pharmacy No No    Take 1 tablet by mouth Every 8 (Eight) Hours As Needed for Moderate Pain.        ---------------------------------------------------------------------------------------------------------------------    Objective       Objective     Hospital Scheduled Meds:  amLODIPine, 10 mg, Oral, Daily  ertapenem, 1 g, Intravenous, Q24H  heparin (porcine), 5,000 Units, Subcutaneous, Q8H  metoprolol tartrate, 25 mg, Oral, BID  prenatal vitamin 27-0.8, 1 tablet, Oral, Daily  sodium chloride, 10 mL, Intravenous, Q12H  tamsulosin, 0.4 mg, Oral, Daily  vitamin D3, 5,000 Units, Oral, Daily      sodium chloride, 50 mL/hr, Last Rate: 50 mL/hr (10/09/22 1057)      ---------------------------------------------------------------------------------------------------------------------   Vital Signs:  Temp:  [98.3 °F (36.8 °C)-99.2 °F (37.3 °C)] 98.3 °F (36.8 °C)  Heart Rate:  [63-94] 63  Resp:  [16-18] 16  BP: (101-154)/(57-68) 101/59  No data found.  SpO2 Percentage    10/08/22 1856 10/09/22 0300 10/09/22 0600   SpO2: 96% 98% 93%     SpO2:  [93 %-98 %] 93 %  on   ;   Device (Oxygen Therapy): room air    Body mass index is 42.94 kg/m².  Wt Readings from Last 3 Encounters:   10/07/22 110 kg (242 lb 6.4 oz)   10/06/22 109 kg (240 lb)   06/17/21 118 kg (260 lb)     ---------------------------------------------------------------------------------------------------------------------     Physical  Exam:    Constitutional:  male is sitting up in bed on room air in no apparent distress.  HENT:  Head: Normocephalic and atraumatic.  Mouth:  Moist mucous membranes.  Poor dentition.  Eyes:  Conjunctivae and EOM are normal.  No scleral icterus.  Neck:  Neck supple.  No JVD present.    Cardiovascular:  Normal rate, regular rhythm and normal heart sounds with no murmur. No edema.  Pulmonary/Chest:  No respiratory distress, no wheezes, no crackles, with normal breath sounds and good air movement.  Abdominal:  Soft.  Bowel sounds are normal.  No distension and no tenderness.  Large hernia of the left sided abdomen.  Chronic indwelling Martin catheter with dark urine and sediment in the tubing.  Musculoskeletal:  No tenderness and no deformity.  No swelling or redness of joints.  2+ edema of bilateral lower extremities with venous stasis changes.  Right hip without erythema, edema, warmth, or wound.  Pain with any palpation or movement.  Neurological:  Alert and oriented to person, place, and time.  No facial droop.  No slurred speech.   Right upper extremity chronic weakness.  Skin:  Skin is warm and dry.  No rash noted.  No pallor.  Venous stasis changes of bilateral lower extremities.  Psychiatric:  Normal mood and affect.  Behavior is normal.    ---------------------------------------------------------------------------------------------------------------------              Results from last 7 days   Lab Units 10/09/22  0231 10/08/22  0055 10/07/22  1138 10/07/22  1045 10/06/22  0753   CRP mg/dL 17.62*  --  21.28*  --   --    LACTATE mmol/L  --   --   --  0.9  --    WBC 10*3/mm3 12.59* 13.20*  --  15.83* 13.23*   HEMOGLOBIN g/dL 12.1* 11.3*  --  13.2 13.1   HEMATOCRIT % 35.8* 34.2*  --  38.5 38.8   MCV fL 88.2 89.5  --  87.1 88.2   MCHC g/dL 33.8 33.0  --  34.3 33.8   PLATELETS 10*3/mm3 193 172  --  203 179   INR   --   --   --   --  1.02     Results from last 7 days   Lab Units 10/09/22  0231 10/08/22  0055  10/07/22  1138 10/06/22  0753   SODIUM mmol/L 135* 137 141 141   POTASSIUM mmol/L 4.0 4.1 4.3 4.4   CHLORIDE mmol/L 100 104 103 105   CO2 mmol/L 25.2 24.3 23.3 23.2   BUN mg/dL 20 21 23 32*   CREATININE mg/dL 1.24 1.19 1.15 1.38*   CALCIUM mg/dL 8.7 8.5* 9.4 9.2   GLUCOSE mg/dL 120* 118* 134* 166*   ALBUMIN g/dL  --   --  3.61 3.80   BILIRUBIN mg/dL  --   --  0.9 0.9   ALK PHOS U/L  --   --  70 78   AST (SGOT) U/L  --   --  61* 100*   ALT (SGPT) U/L  --   --  28 33   Estimated Creatinine Clearance: 59.5 mL/min (by C-G formula based on SCr of 1.24 mg/dL).  No results found for: AMMONIA    No results found for: HGBA1C, POCGLU  No results found for: HGBA1C  No results found for: TSH, FREET4    Blood Culture   Date Value Ref Range Status   10/07/2022 Gram Negative Bacilli (C)  Preliminary   10/07/2022 Gram Negative Bacilli (C)  Preliminary     Urine Culture   Date Value Ref Range Status   10/07/2022 >100,000 CFU/mL Klebsiella pneumoniae ESBL (A)  Final     Comment:       Consider infectious disease consult.  Susceptibility results may not correlate to clinical outcomes.     No results found for: WOUNDCX  No results found for: STOOLCX  No results found for: RESPCX  Pain Management Panel    There is no flowsheet data to display.       I have personally reviewed the above laboratory results.   ---------------------------------------------------------------------------------------------------------------------  Imaging Results (Last 7 Days)     Procedure Component Value Units Date/Time    MRI Hip Right With & Without Contrast [[id number]] Collected: 10/07/22 1345     Updated: 10/07/22 1350    Narrative:      EXAM:    MR Right Lower Extremity Without and With Intravenous Contrast, Hip     EXAM DATE:    10/7/2022 1:03 PM     CLINICAL HISTORY:    Severe right hip pain/leukocytosis/fever/inability to walk     TECHNIQUE:    Multiplanar magnetic resonance images of the right hip without and  with intravenous contrast.      COMPARISON:    10/06/2022     FINDINGS:       BONES/JOINTS:  Again noted are fairly advanced changes of  osteoarthritic change and joint space collapse of the right hip.  Subchondral cystic change of both the femoral head and acetabulum are  noted. While no characteristic findings of AVN are noted this may  represent sequelae of chronic AVN and subcortical collapse.  In setting  of fever and hip pain chronic septic arthritis could also be considered  but the morphologic alteration of the joint do not appear to be acute.   No dislocation.       Impression:      1.  Again noted are fairly advanced changes of osteoarthritic change and  joint space collapse of the right hip. Subchondral cystic change of both  the femoral head and acetabulum are noted. While no characteristic  findings of AVN are noted this may represent sequelae of chronic AVN and  subcortical collapse.  2.  Small right hip joint effusion that could be reactive secondary to  advanced osteoarthritic change.  3.  In setting of fever and hip pain chronic septic arthritis could also  be considered but the morphologic alteration of the joint do not appear  to be acute.     This report was finalized on 10/7/2022 1:47 PM by Dr. Moiz Lobo MD.       XR Chest AP [[id number]] Collected: 10/07/22 1124     Updated: 10/07/22 1140    Narrative:      EXAM:    XR Chest, 1 View     EXAM DATE:    10/7/2022 10:31 AM     CLINICAL HISTORY:    fever     TECHNIQUE:    Frontal view of the chest.     COMPARISON:    No relevant prior studies available.     FINDINGS:    LUNGS:  Coarsened interstitial markings noted throughout the lungs.    PLEURAL SPACE:  Unremarkable.  No pneumothorax.    HEART:  Cardiomegaly.    MEDIASTINUM:  Unremarkable.    BONES/JOINTS:  Unremarkable.       Impression:        Cardiomegaly.     This report was finalized on 10/7/2022 11:24 AM by Dr. Moiz Lobo MD.           I have personally reviewed the above radiology results.    ---------------------------------------------------------------------------------------------------------------------      Pertinent Infectious Disease Results    CRP is improved at 17.62.  WBC is improved at 12.59.  Lactic acid on admission was normal at 0.9.  Urinalysis on 10/7/2022 was positive with 31-50 WBCs and 4+ bacteria.  Urine culture finalized as greater than 100,000 colonies of ESBL E. coli.  Chest x-ray on 10/7/2022 showed cardiomegaly.  Coarsened interstitial markings noted throughout the lungs.  X-ray of the right hip on 10/6/2022 showed very advanced right hip osteoarthritic change and possibly chronic AVN with deformity of the femoral head contour.  CT pelvis on 10/6/2022 showed there is a large anterior abdominal wall ventral hernia containing nondilated large and small bowel.  Again there is marked deformity of the right humeral head likely related to chronic AVN or advanced osteoarthritis.  No fracture line is evident.  MRI of the right hip on 10/7/2022 showed again noted are fairly advanced changes of osteoarthritic change and joint space collapse of the right hip.  Subchondral cystic change of both the femoral head and acetabulum are noted.  While no characteristic findings of AVN are noted this may represent sequelae of chronic AVN and subcortical collapse.  Small right hip joint effusion that could be reactive secondary to advanced osteoarthritic change.  In setting of fever and hip pain chronic septic arthritis could also be considered but the morphologic alteration of the joint do not appear to be acute.  COVID-19 and flu A/B PCR on 10/7/2022 was negative.  2 out of 2 blood cultures on 10/7/2022 are so far showing gram-negative bacilli with BC ID finalized as ESBL Klebsiella pneumoniae.  Blood cultures on 10/9/2022 are in progress.    Assessment & Plan      Assessment      Sepsis present on admission  ESBL Klebsiella pneumoniae bacteremia  ESBL Klebsiella pneumoniae complicated  UTI    Plan      I examined the patient this morning and he is sitting up in bed on room air in no apparent distress. He has had a chronic indwelling Martin catheter since 2021 after hospitalization for COVID-19.  Abdomen is soft, nontender, with normal bowel sounds and Martin catheter is in place with dark yellow urine and sediment in the tubing. CRP and white count are improving on Invanz, as urinalysis was positive with 31-50 WBCs and 4+ bacteria and urine culture finalized as greater than 100,000 colonies of ESBL E. coli.  2 out of 2 blood cultures are also showing gram-negative bacilli with BC ID finalized as ESBL Klebsiella pneumoniae.  We are agreeable with current management with Invanz.    Patient's clinical exam with right hip pain to any palpation or movement of the right hip is out of proportion to what is visualized on imaging. As MRI is most suggestive of chronic destructive changes, we are not concerned with septic arthritis at this time, but if patient were to develop any concerning symptoms with developing erythema, edema, or effusion, would recommend CT-guided aspiration at that time.    Again, thank you Dr. Avilez for allowing us to participate in the care of your patient and please feel free to call for any questions you may have.    Code Status:     Code Status and Medical Interventions:   Ordered at: 10/07/22 1416     Code Status (Patient has no pulse and is not breathing):    CPR (Attempt to Resuscitate)     Medical Interventions (Patient has pulse or is breathing):    Full Support     Elisa Verduzco PA-C  10/09/22  11:26 EDT     Electronically signed by Elisa Verduzco PA-C, 10/09/22, 11:48 AM EDT.

## 2022-10-09 NOTE — PLAN OF CARE
Goal Outcome Evaluation:  Plan of Care Reviewed With: patient        Progress: no change  Outcome Evaluation: Pt has rested in bed overnight. VSS. Pain well controlled with PRN meds. No acute changes overnight.

## 2022-10-09 NOTE — PLAN OF CARE
Goal Outcome Evaluation:              Outcome Evaluation: Pt has rested in bed throughout the shift. Pt complained of pain, PRN medications given per MAR. No acute changes noted. Will continue plan of care.

## 2022-10-10 ENCOUNTER — APPOINTMENT (OUTPATIENT)
Dept: GENERAL RADIOLOGY | Facility: HOSPITAL | Age: 72
End: 2022-10-10

## 2022-10-10 LAB
ANION GAP SERPL CALCULATED.3IONS-SCNC: 9.5 MMOL/L (ref 5–15)
APPEARANCE FLD: ABNORMAL
BACTERIA SPEC AEROBE CULT: ABNORMAL
BACTERIA SPEC AEROBE CULT: ABNORMAL
BUN SERPL-MCNC: 19 MG/DL (ref 8–23)
BUN/CREAT SERPL: 17.3 (ref 7–25)
CALCIUM SPEC-SCNC: 8.5 MG/DL (ref 8.6–10.5)
CHLORIDE SERPL-SCNC: 102 MMOL/L (ref 98–107)
CO2 SERPL-SCNC: 24.5 MMOL/L (ref 22–29)
COLOR FLD: ABNORMAL
CREAT SERPL-MCNC: 1.1 MG/DL (ref 0.76–1.27)
DEPRECATED RDW RBC AUTO: 49 FL (ref 37–54)
EGFRCR SERPLBLD CKD-EPI 2021: 71.3 ML/MIN/1.73
EOSINOPHIL NFR FLD MANUAL: 1 %
ERYTHROCYTE [DISTWIDTH] IN BLOOD BY AUTOMATED COUNT: 14.9 % (ref 12.3–15.4)
GLUCOSE SERPL-MCNC: 125 MG/DL (ref 65–99)
GRAM STN SPEC: ABNORMAL
HCT VFR BLD AUTO: 35.6 % (ref 37.5–51)
HGB BLD-MCNC: 11.7 G/DL (ref 13–17.7)
INR PPP: 1.04 (ref 0.9–1.1)
ISOLATED FROM: ABNORMAL
ISOLATED FROM: ABNORMAL
MCH RBC QN AUTO: 29.2 PG (ref 26.6–33)
MCHC RBC AUTO-ENTMCNC: 32.9 G/DL (ref 31.5–35.7)
MCV RBC AUTO: 88.8 FL (ref 79–97)
NEUTROPHILS NFR FLD MANUAL: 99 %
NUC CELL # FLD: ABNORMAL /MM3
PLATELET # BLD AUTO: 205 10*3/MM3 (ref 140–450)
PMV BLD AUTO: 10.1 FL (ref 6–12)
POTASSIUM SERPL-SCNC: 4.2 MMOL/L (ref 3.5–5.2)
PROTHROMBIN TIME: 13.8 SECONDS (ref 12.1–14.7)
RBC # BLD AUTO: 4.01 10*6/MM3 (ref 4.14–5.8)
RBC # FLD AUTO: ABNORMAL 10*3/UL
SODIUM SERPL-SCNC: 136 MMOL/L (ref 136–145)
WBC NRBC COR # BLD: 11.32 10*3/MM3 (ref 3.4–10.8)

## 2022-10-10 PROCEDURE — 77002 NEEDLE LOCALIZATION BY XRAY: CPT

## 2022-10-10 PROCEDURE — 89051 BODY FLUID CELL COUNT: CPT | Performed by: HOSPITALIST

## 2022-10-10 PROCEDURE — 87186 SC STD MICRODIL/AGAR DIL: CPT | Performed by: HOSPITALIST

## 2022-10-10 PROCEDURE — 97166 OT EVAL MOD COMPLEX 45 MIN: CPT

## 2022-10-10 PROCEDURE — 25010000002 GENTAMICIN PER 80 MG: Performed by: INTERNAL MEDICINE

## 2022-10-10 PROCEDURE — 77002 NEEDLE LOCALIZATION BY XRAY: CPT | Performed by: RADIOLOGY

## 2022-10-10 PROCEDURE — 85027 COMPLETE CBC AUTOMATED: CPT

## 2022-10-10 PROCEDURE — 0S993ZX DRAINAGE OF RIGHT HIP JOINT, PERCUTANEOUS APPROACH, DIAGNOSTIC: ICD-10-PCS | Performed by: RADIOLOGY

## 2022-10-10 PROCEDURE — 25010000002 ERTAPENEM PER 500 MG: Performed by: INTERNAL MEDICINE

## 2022-10-10 PROCEDURE — 87070 CULTURE OTHR SPECIMN AEROBIC: CPT | Performed by: HOSPITALIST

## 2022-10-10 PROCEDURE — 80048 BASIC METABOLIC PNL TOTAL CA: CPT

## 2022-10-10 PROCEDURE — 87205 SMEAR GRAM STAIN: CPT | Performed by: HOSPITALIST

## 2022-10-10 PROCEDURE — 25010000002 HEPARIN (PORCINE) PER 1000 UNITS: Performed by: INTERNAL MEDICINE

## 2022-10-10 PROCEDURE — 99232 SBSQ HOSP IP/OBS MODERATE 35: CPT

## 2022-10-10 PROCEDURE — 85610 PROTHROMBIN TIME: CPT | Performed by: RADIOLOGY

## 2022-10-10 PROCEDURE — 99233 SBSQ HOSP IP/OBS HIGH 50: CPT | Performed by: INTERNAL MEDICINE

## 2022-10-10 RX ORDER — SIMETHICONE 80 MG
80 TABLET,CHEWABLE ORAL 4 TIMES DAILY PRN
Status: DISCONTINUED | OUTPATIENT
Start: 2022-10-10 | End: 2022-10-20 | Stop reason: HOSPADM

## 2022-10-10 RX ORDER — POLYETHYLENE GLYCOL 3350 17 G/17G
17 POWDER, FOR SOLUTION ORAL DAILY
Status: DISCONTINUED | OUTPATIENT
Start: 2022-10-10 | End: 2022-10-20 | Stop reason: HOSPADM

## 2022-10-10 RX ADMIN — ERTAPENEM SODIUM 1 G: 1 INJECTION, POWDER, LYOPHILIZED, FOR SOLUTION INTRAMUSCULAR; INTRAVENOUS at 10:49

## 2022-10-10 RX ADMIN — SODIUM CHLORIDE 50 ML/HR: 9 INJECTION, SOLUTION INTRAVENOUS at 05:36

## 2022-10-10 RX ADMIN — TAMSULOSIN HYDROCHLORIDE 0.4 MG: 0.4 CAPSULE ORAL at 08:18

## 2022-10-10 RX ADMIN — Medication 10 ML: at 08:18

## 2022-10-10 RX ADMIN — METOPROLOL TARTRATE 25 MG: 25 TABLET, FILM COATED ORAL at 08:17

## 2022-10-10 RX ADMIN — Medication 5000 UNITS: at 08:17

## 2022-10-10 RX ADMIN — AMLODIPINE BESYLATE 10 MG: 10 TABLET ORAL at 08:17

## 2022-10-10 RX ADMIN — OXYCODONE HYDROCHLORIDE AND ACETAMINOPHEN 1 TABLET: 5; 325 TABLET ORAL at 15:03

## 2022-10-10 RX ADMIN — PRENATAL VIT W/ FE FUMARATE-FA TAB 27-0.8 MG 1 TABLET: 27-0.8 TAB at 08:18

## 2022-10-10 RX ADMIN — GENTAMICIN SULFATE 390 MG: 40 INJECTION, SOLUTION INTRAMUSCULAR; INTRAVENOUS at 15:03

## 2022-10-10 RX ADMIN — HEPARIN SODIUM 5000 UNITS: 5000 INJECTION INTRAVENOUS; SUBCUTANEOUS at 21:00

## 2022-10-10 RX ADMIN — Medication 10 ML: at 20:56

## 2022-10-10 RX ADMIN — HEPARIN SODIUM 5000 UNITS: 5000 INJECTION INTRAVENOUS; SUBCUTANEOUS at 05:31

## 2022-10-10 RX ADMIN — METOPROLOL TARTRATE 25 MG: 25 TABLET, FILM COATED ORAL at 20:55

## 2022-10-10 RX ADMIN — OXYCODONE HYDROCHLORIDE AND ACETAMINOPHEN 1 TABLET: 5; 325 TABLET ORAL at 08:52

## 2022-10-10 RX ADMIN — HEPARIN SODIUM 5000 UNITS: 5000 INJECTION INTRAVENOUS; SUBCUTANEOUS at 15:03

## 2022-10-10 RX ADMIN — OXYCODONE HYDROCHLORIDE AND ACETAMINOPHEN 1 TABLET: 5; 325 TABLET ORAL at 03:06

## 2022-10-10 NOTE — THERAPY EVALUATION
Patient Name: Solitario Mcdaniel  : 1950    MRN: [id number]                              Today's Date: 10/10/2022       Admit Date: 10/7/2022    Visit Dx:     ICD-10-CM ICD-9-CM   1. Septic hip (HCC)  M00.9 711.05     Patient Active Problem List   Diagnosis   • Septic hip (HCC)     Past Medical History:   Diagnosis Date   • Arthritis    • BPH (benign prostatic hyperplasia)    • Hypertension    • Osteoarthritis      Past Surgical History:   Procedure Laterality Date   • COLONOSCOPY     • TONSILLECTOMY     • VASECTOMY        General Information     Row Name 10/10/22 1116          OT Time and Intention    Document Type evaluation  -LM     Mode of Treatment occupational therapy  -LM     Row Name 10/10/22 111          General Information    Patient Profile Reviewed yes  -LM     Prior Level of Function ADL's;all household mobility;transfer;min assist:  lift recliner, bsc, cane, w/c.  Patient reports significant decline over past 6 months since covid  -LM     Existing Precautions/Restrictions fall  -LM     Barriers to Rehab previous functional deficit;medically complex  -LM     Row Name 10/10/22 111          Living Environment    People in Home child(amanda), adult  -LM     Row Name 10/10/22 111          Cognition    Orientation Status (Cognition) oriented x 3  -LM     Row Name 10/10/22 111          Safety Issues, Functional Mobility    Impairments Affecting Function (Mobility) balance;coordination;endurance/activity tolerance;strength  -LM           User Key  (r) = Recorded By, (t) = Taken By, (c) = Cosigned By    Initials Name Provider Type    LM Shantel Arnold OT Occupational Therapist                 Mobility/ADL's     Row Name 10/10/22 111          Transfers    Transfers toilet transfer  -LM     Comment, (Transfers) unable to safely tolerate due to pain  -LM     Row Name 10/10/22 111          Activities of Daily Living    BADL Assessment/Intervention bathing;upper body dressing;lower body  dressing;grooming;feeding;toileting  -     Row Name 10/10/22 1119          Bathing Assessment/Intervention    Bonner Level (Bathing) maximum assist (25% patient effort);dependent (less than 25% patient effort)  -     Row Name 10/10/22 1119          Upper Body Dressing Assessment/Training    Bonner Level (Upper Body Dressing) maximum assist (25% patient effort)  -     Row Name 10/10/22 1119          Lower Body Dressing Assessment/Training    Bonner Level (Lower Body Dressing) dependent (less than 25% patient effort)  -     Row Name 10/10/22 1119          Self-Feeding Assessment/Training    Bonner Level (Feeding) set up  -[facility] Name 10/10/22 1119          Toileting Assessment/Training    Bonner Level (Toileting) dependent (less than 25% patient effort)  -           User Key  (r) = Recorded By, (t) = Taken By, (c) = Cosigned By    Initials Name Provider Type     Shantel Arnold, OT Occupational Therapist               Obj/Interventions     Row Name 10/10/22 1121          Sensory Assessment (Somatosensory)    Sensory Assessment (Somatosensory) right UE  -     Right UE Sensory Assessment general sensation;light touch awareness;proprioception;impaired  -     Sensory Subjective Reports numbness;tingling  -     Row Name 10/10/22 1121          Vision Assessment/Intervention    Visual Impairment/Limitations WFL;corrective lenses full-time  -     Row Name 10/10/22 1121          Range of Motion Comprehensive    General Range of Motion upper extremity range of motion deficits identified  -     Comment, General Range of Motion rue shoulder 1/4, elbow 3/4, wrist and hand 1/4.  -     Row Name 10/10/22 1121          Strength Comprehensive (MMT)    General Manual Muscle Testing (MMT) Assessment upper extremity strength deficits identified  -LM     Comment, General Manual Muscle Testing (MMT) Assessment Rue 2+  -     Row Name 10/10/22 1126 10/10/22 1121       Motor Skills     Motor Skills functional endurance  -LM coordination;functional endurance  -LM    Coordination -- fine motor deficit;gross motor deficit;right;severe impairment  -LM    Functional Endurance P+  -LM --          User Key  (r) = Recorded By, (t) = Taken By, (c) = Cosigned By    Initials Name Provider Type    Shantel Dutta, OT Occupational Therapist               Goals/Plan     Row Name 10/10/22 1125          Transfer Goal 1 (OT)    Activity/Assistive Device (Transfer Goal 1, OT) transfers, all;walker, rolling;commode, 3-in-1  -LM     Alexander Level/Cues Needed (Transfer Goal 1, OT) moderate assist (50-74% patient effort);minimum assist (75% or more patient effort)  -LM     Time Frame (Transfer Goal 1, OT) by discharge  -LM     Row Name 10/10/22 1125          Problem Specific Goal 1 (OT)    Problem Specific Goal 1 (OT) increase fxl activity tolerance to F to assist with fxl mobility and badl.  -     Row Name 10/10/22 1125          Therapy Assessment/Plan (OT)    Planned Therapy Interventions (OT) activity tolerance training;adaptive equipment training;BADL retraining;patient/caregiver education/training;transfer/mobility retraining;strengthening exercise;ROM/therapeutic exercise  -LM           User Key  (r) = Recorded By, (t) = Taken By, (c) = Cosigned By    Initials Name Provider Type    Shantel Dutta OT Occupational Therapist               Clinical Impression     Row Name 10/10/22 1124          Plan of Care Review    Plan of Care Reviewed With patient  -     Row Name 10/10/22 1124          Therapy Assessment/Plan (OT)    Patient/Family Therapy Goal Statement (OT) return to plof  -LM     Rehab Potential (OT) fair, will monitor progress closely  -LM     Criteria for Skilled Therapeutic Interventions Met (OT) yes;meets criteria;skilled treatment is necessary  -LM     Therapy Frequency (OT) other (see comments)  prn and/or to monitor fxl progress  -LM     Row Name 10/10/22 1124          Therapy Plan  Review/Discharge Plan (OT)    Anticipated Discharge Disposition (OT) home with 24/7 care;home with home health;skilled nursing facility  versus  -LM     Row Name 10/10/22 1124          Positioning and Restraints    Post Treatment Position bed  -LM     In Bed call light within reach;encouraged to call for assist  -LM           User Key  (r) = Recorded By, (t) = Taken By, (c) = Cosigned By    Initials Name Provider Type    LM Shantel Arnold OT Occupational Therapist               Outcome Measures     Row Name 10/10/22 0817          How much help from another person do you currently need...    Turning from your back to your side while in flat bed without using bedrails? 2  -VL     Moving from lying on back to sitting on the side of a flat bed without bedrails? 1  -VL     Moving to and from a bed to a chair (including a wheelchair)? 1  -VL     Standing up from a chair using your arms (e.g., wheelchair, bedside chair)? 1  -VL     Climbing 3-5 steps with a railing? 1  -VL     To walk in hospital room? 1  -VL     AM-PAC 6 Clicks Score (PT) 7  -VL     Highest level of mobility 2 --> Bed activities/dependent transfer  -VL           User Key  (r) = Recorded By, (t) = Taken By, (c) = Cosigned By    Initials Name Provider Type     Uma Sterling RN Registered Nurse                  OT Recommendation and Plan  Planned Therapy Interventions (OT): activity tolerance training, adaptive equipment training, BADL retraining, patient/caregiver education/training, transfer/mobility retraining, strengthening exercise, ROM/therapeutic exercise  Therapy Frequency (OT): other (see comments) (prn and/or to monitor fxl progress)  Plan of Care Review  Plan of Care Reviewed With: patient     Time Calculation:     Therapy Charges for Today     Code Description Service Date Service Provider Modifiers Qty    [id number]  OT EVAL MOD COMPLEXITY 4 10/10/2022 Shantel Arnold OT GO 1               Shantel Arnold OT  10/10/2022

## 2022-10-10 NOTE — PLAN OF CARE
Goal Outcome Evaluation:              Outcome Evaluation: Pt is restind in bed at this time. Pt went for joint aspiration this shift. Pt complained of pain, PRN medication given per MAR. Pt has had two large BMs this shift. Pt voices no complaints at this time. Will continue plan of care.

## 2022-10-10 NOTE — CASE MANAGEMENT/SOCIAL WORK
Discharge Planning Assessment  [facility]     Patient Name: Solitario Mcdaniel  MRN: [id number]  Today's Date: 10/10/2022    Admit Date: 10/7/2022    Plan: CM spoke with pt's daughter Maryann Blankenship via telephone. Pt currently lives in [facility] [place] [facility] [place] with daughter and GABE. Pt's daughter requests SNF placement if pt is unable to ambulate. Pt has been in [facility] in the past and she is agreeable to [facility], and [facility] facilities. Pt has walker and W/C via unknown provider. Pt sleeps in a lift chair at home. Pt has [facility] for routine lab draws. PCP is Corrine WOODSON-she does telehealth visits with him. He gets rx filled at [facility]. He has Medicare parat A only and Medicaid pending. He does not have POA/living will. Pt's will need ambulance for transport home at d/c if he is unable to get in and out of the car. SS will follow.   Discharge Needs Assessment     Row Name 10/10/22 1102       Living Environment    People in Home child(amanda), adult    Name(s) of People in Home Daughter Maryann Blankenship and GABE Blankenship    Current Living Arrangements home    Primary Care Provided by self    Provides Primary Care For no one, unable/limited ability to care for self    Family Caregiver if Needed child(amanda), adult    Family Caregiver Names Daughter Maryann Blankenship [phone]    Quality of Family Relationships supportive    Living Arrangement Comments Pt's daughter wants him to be able to ambulate when he comes home otherwise may need SNF placement.       Resource/Environmental Concerns    Resource/Environmental Concerns none    Transportation Concerns other (see comments)  may need ambulance at d/c.       Transition Planning    Patient/Family Anticipates Transition to long-term care facility;inpatient rehabilitation facility    Patient/Family Anticipated Services at Transition rehabilitation services;skilled nursing    Transportation Anticipated health plan transportation       Discharge Needs  Assessment    Readmission Within the Last 30 Days no previous admission in last 30 days    Current Outpatient/Agency/Support Group homecare agency    Equipment Currently Used at Home walker, standard;wheelchair;other (see comments)  Unknown provider for DME, pt has a lift chair at home that he sleeps in at night.    Concerns to be Addressed care coordination/care conferences    Anticipated Changes Related to Illness inability to care for self    Equipment Needed After Discharge none    Outpatient/Agency/Support Group Needs skilled nursing facility;inpatient rehabilitation facility    Patient's Choice of Community Agency(s) Pt has [facility] for routine lab draws.    Current Discharge Risk chronically ill;dependent with mobility/activities of daily living;physical impairment               Discharge Plan     Row Name 10/10/22 1109       Plan    Plan CM spoke with pt's daughter Maryann Blankenship via telephone. Pt currently lives in [facility] with daughter and GABE. Pt's daughter requests SNF placement if pt is unable to ambulate. Pt has been in [facility] in the past and she is agreeable to [facility], and [facility] facilities. Pt has walker and W/C via unknown provider. Pt sleeps in a lift chair at home. Pt has [facility] for routine lab draws. PCP is Corrine WOODSON-she does telehealth visits with him. He gets rx filled at [email or web address]. He has Medicare parat A only and Medicaid pending. He does not have POA/living will. Pt's will need ambulance for transport home at d/c if he is unable to get in and out of the car. SS will follow.    Plan Comments 10/10: Admit 10/7, c/o hip pain after fall and unable to walk, consult Ortho no surgery recc now, septic hip r/o, plan CT guided aspiration of hip effusion, contact isolation, consult ID, Invanz IV, IVF's, hx urine retention chronic F/C, repeat bld cx +, Percocet prn for pain.              Jaylyn Miranda RN

## 2022-10-10 NOTE — PROGRESS NOTES
PROGRESS NOTE         Patient Identification:  Name:  Solitario Mcdaniel  Age:  71 y.o.  Sex:  male  :  1950  MRN:  [id number]  Visit Number:  [id number]  Primary Care Provider:  Provider, No Known         LOS: 3 days       ----------------------------------------------------------------------------------------------------------------------  Subjective       Chief Complaints:    Hip Pain        Interval History:      The patient is resting comfortably in bed on room air in no apparent distress.  Continues to have right hip pain to palpation and movement.  Pain noted upon internal rotation of the right hip.  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  Last Tuesday, he reports that he slid from his recliner and family members were able to help him up.  He initially reports that he did not have pain and was able to move and walk the next morning.  However, on Friday, the patient experienced worsening severe pain from the right hip and asked his daughter to bring him to the hospital.  Nurse reports no issues overnight.  Afebrile, no diarrhea.  WBC is slightly improved at 11.32.  Blood cultures on 10/7/2022 finalized as ESBL Klebsiella pneumoniae.  Repeat blood cultures on 10/9/2022 are so far showing gram-negative bacilli.    Review of Systems:    Constitutional: no fever, chills and night sweats. No appetite change or unexpected weight change.  Fatigue and generalized weakness.  Eyes: no eye drainage, itching or redness.  HEENT: no mouth sores, dysphagia or nose bleed.  Respiratory: no for shortness of breath, cough or production of sputum.  Cardiovascular: no chest pain, no palpitations, no orthopnea.   Gastrointestinal: no nausea, vomiting or diarrhea. No abdominal pain, hematemesis or rectal bleeding.  Genitourinary: no dysuria or polyuria.  Hematologic/lymphatic: no lymph node abnormalities, no easy bruising or easy  bleeding.  Musculoskeletal: Arthralgias.  Gait problems.  Right hip pain.  Skin: No rash and no itching.  Neurological: no loss of consciousness, no seizure, no headache.  Chronic right upper extremity weakness.  Behavioral/Psych: no depression or suicidal ideation.  Endocrine: no hot flashes.  Immunologic: negative.      ----------------------------------------------------------------------------------------------------------------------      Objective       Current [facility] Meds:  amLODIPine, 10 mg, Oral, Daily  ertapenem, 1 g, Intravenous, Q24H  heparin (porcine), 5,000 Units, Subcutaneous, Q8H  metoprolol tartrate, 25 mg, Oral, BID  polyethylene glycol, 17 g, Oral, Daily  prenatal vitamin 27-0.8, 1 tablet, Oral, Daily  sodium chloride, 10 mL, Intravenous, Q12H  tamsulosin, 0.4 mg, Oral, Daily  vitamin D3, 5,000 Units, Oral, Daily         ----------------------------------------------------------------------------------------------------------------------    Vital Signs:  Temp:  [98 °F (36.7 °C)-98.6 °F (37 °C)] 98.1 °F (36.7 °C)  Heart Rate:  [63-79] 70  Resp:  [16-18] 16  BP: (101-159)/(56-69) 145/69  Mean Arterial Pressure (Non-Invasive) for the past 24 hrs (Last 3 readings):   Noninvasive MAP (mmHg)   10/10/22 0304 89   10/09/22 2256 89   10/09/22 1800 92     SpO2 Percentage    10/09/22 2256 10/10/22 0304 10/10/22 0600   SpO2: 98% 97% 94%     SpO2:  [94 %-98 %] 94 %  on   ;   Device (Oxygen Therapy): room air    Body mass index is 42.94 kg/m².  Wt Readings from Last 3 Encounters:   10/07/22 110 kg (242 lb 6.4 oz)   10/06/22 109 kg (240 lb)   06/17/21 118 kg (260 lb)        Intake/Output Summary (Last 24 hours) at 10/10/2022 0938  Last data filed at 10/10/2022 0904  Gross per 24 hour   Intake 1593.19 ml   Output 2150 ml   Net -556.81 ml     Diet Regular  ----------------------------------------------------------------------------------------------------------------------      Physical Exam:    Constitutional:   male is sitting up in bed on room air in no apparent distress.  HENT:  Head: Normocephalic and atraumatic.  Mouth:  Moist mucous membranes.  Poor dentition.  Eyes:  Conjunctivae and EOM are normal.  No scleral icterus.  Neck:  Neck supple.  No JVD present.    Cardiovascular:  Normal rate, regular rhythm and normal heart sounds with no murmur. No edema.  Pulmonary/Chest:  No respiratory distress, no wheezes, no crackles, with normal breath sounds and good air movement.  Abdominal:  Soft.  Bowel sounds are normal.  No distension.  Tenderness to palpation diffusely, but especially on the right. Large hernia of the left sided abdomen.  Chronic indwelling Martin catheter with dark urine in the collection bag.  Musculoskeletal:  No tenderness and no deformity.  No swelling or redness of joints.  1+ edema of bilateral lower extremities with venous stasis changes.  Right hip without erythema, edema, warmth, or wound.  Pain with any palpation or movement.  Pain with internal rotation of the hip.  Neurological:  Alert and oriented to person, place, and time.  No facial droop.  No slurred speech.   Right upper extremity chronic weakness.  Skin:  Skin is warm and dry.  No rash noted.  No pallor.  Venous stasis changes of bilateral lower extremities.  Psychiatric:  Normal mood and affect.  Behavior is normal.       ----------------------------------------------------------------------------------------------------------------------            Results from last 7 days   Lab Units 10/10/22  0111 10/09/22  0231 10/08/22  0055 10/07/22  1138 10/07/22  1045 10/06/22  0753   CRP mg/dL  --  17.62*  --  21.28*  --   --    LACTATE mmol/L  --   --   --   --  0.9  --    WBC 10*3/mm3 11.32* 12.59* 13.20*  --  15.83* 13.23*   HEMOGLOBIN g/dL 11.7* 12.1* 11.3*  --  13.2 13.1   HEMATOCRIT % 35.6* 35.8* 34.2*  --  38.5 38.8   MCV fL 88.8 88.2 89.5  --  87.1 88.2   MCHC g/dL 32.9 33.8 33.0  --  34.3 33.8   PLATELETS 10*3/mm3 205 193 172   --  203 179   INR   --   --   --   --   --  1.02     Results from last 7 days   Lab Units 10/10/22  0112 10/09/22  0231 10/08/22  0055 10/07/22  1138 10/06/22  0753   SODIUM mmol/L 136 135* 137 141 141   POTASSIUM mmol/L 4.2 4.0 4.1 4.3 4.4   CHLORIDE mmol/L 102 100 104 103 105   CO2 mmol/L 24.5 25.2 24.3 23.3 23.2   BUN mg/dL 19 20 21 23 32*   CREATININE mg/dL 1.10 1.24 1.19 1.15 1.38*   CALCIUM mg/dL 8.5* 8.7 8.5* 9.4 9.2   GLUCOSE mg/dL 125* 120* 118* 134* 166*   ALBUMIN g/dL  --   --   --  3.61 3.80   BILIRUBIN mg/dL  --   --   --  0.9 0.9   ALK PHOS U/L  --   --   --  70 78   AST (SGOT) U/L  --   --   --  61* 100*   ALT (SGPT) U/L  --   --   --  28 33   Estimated Creatinine Clearance: 67.1 mL/min (by C-G formula based on SCr of 1.1 mg/dL).  No results found for: AMMONIA    No results found for: HGBA1C, POCGLU  No results found for: HGBA1C  No results found for: TSH, FREET4    Blood Culture   Date Value Ref Range Status   10/09/2022 Abnormal Stain (C)  Preliminary   10/09/2022 Abnormal Stain (C)  Preliminary   10/07/2022 Klebsiella pneumoniae ESBL (C)  Final     Comment:       Consider infectious disease consult.  Susceptibility results may not correlate to clinical outcomes.  For ESBL-producing infections in the blood, a carbapenem is recommended as first-line therapy for optimal clinical outcomes.   10/07/2022 Klebsiella pneumoniae ESBL (C)  Final     Comment:       Consider infectious disease consult.  Susceptibility results may not correlate to clinical outcomes.  For ESBL-producing infections in the blood, a carbapenem is recommended as first-line therapy for optimal clinical outcomes.     Urine Culture   Date Value Ref Range Status   10/07/2022 >100,000 CFU/mL Klebsiella pneumoniae ESBL (A)  Final     Comment:       Consider infectious disease consult.  Susceptibility results may not correlate to clinical outcomes.     No results found for: WOUNDCX  No results found for: STOOLCX  No results found for:  [facility]  Pain Management Panel    There is no flowsheet data to display.           ----------------------------------------------------------------------------------------------------------------------  Imaging Results (Last 24 Hours)     ** No results found for the last 24 hours. **          --------------------------------------------------------------------------------------    Pertinent Infectious Disease Results     CRP is improved at 17.62.  WBC is improved at 12.59.  Lactic acid on admission was normal at 0.9.  Urinalysis on 10/7/2022 was positive with 31-50 WBCs and 4+ bacteria.  Urine culture finalized as greater than 100,000 colonies of ESBL E. coli.  Chest x-ray on 10/7/2022 showed cardiomegaly.  Coarsened interstitial markings noted throughout the lungs.  X-ray of the right hip on 10/6/2022 showed very advanced right hip osteoarthritic change and possibly chronic AVN with deformity of the femoral head contour.  CT pelvis on 10/6/2022 showed there is a large anterior abdominal wall ventral hernia containing nondilated large and small bowel.  Again there is marked deformity of the right humeral head likely related to chronic AVN or advanced osteoarthritis.  No fracture line is evident.  MRI of the right hip on 10/7/2022 showed again noted are fairly advanced changes of osteoarthritic change and joint space collapse of the right hip.  Subchondral cystic change of both the femoral head and acetabulum are noted.  While no characteristic findings of AVN are noted this may represent sequelae of chronic AVN and subcortical collapse.  Small right hip joint effusion that could be reactive secondary to advanced osteoarthritic change.  In setting of fever and hip pain chronic septic arthritis could also be considered but the morphologic alteration of the joint do not appear to be acute.  COVID-19 and flu A/B PCR on 10/7/2022 was negative.     The patient is resting comfortably in bed on room air in no apparent  distress.  Continues to have right hip pain to palpation and movement.  Pain noted upon internal rotation of the right hip.  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    Assessment/Plan         ASSESSMENT:    Sepsis present on admission  ESBL Klebsiella pneumoniae bacteremia  ESBL Klebsiella pneumoniae complicated UTI      PLAN:    I have seen and examined the patient myself this morning with Elisa Verduzco PA-C with whom I discussed plan of care and got report from primary RN. Here are my findings:    Patient is resting comfortably with no acute distress currently on room air, very pleasant and given a very detailed history this morning.  Upon further questioning patient did not have right hip pain immediately after his fall but rather was able to walk on his right leg without any difficulty after he slid off his recliner but 2 to 3 days later started having significant right hip pain with significant decreased range of motion and seems to be unrelated and again consistent with a mild injury he had from sliding off his recliner.    Prior to that patient had had difficulty with a Martin catheter with subsequent bleeding pain dysuria lower abdominal discomfort chills and blood clots and the Martin had to be replaced and that was about a week ago on Monday.    Patient continues to have significant right hip pain to even slight touch or slight rotation which is very consistent with an acute process of the right hip such as septic arthritis or osteomyelitis.  Otherwise his exam is fairly benign with no rash no erythema no warmth lungs are clear to auscultation without any crackles wheezing or rhonchi and abdomen is soft nontender with no guarding with mild edema to both bilateral lower extremities.    I called Dr. Hampton and discussed the case with him and I do believe that patient started with UTI upon the Martin malfunction with subsequent acute  pyelonephritis and I do believe at some point he seeded his right hip that was already disease with very advanced osteoarthritis and currently is showing evidence of right hip septic arthritis with significant decreased range of motion and persistent bacteremia.    I am concerned about his persistent bacteremia and would recommend to proceed with CT-guided right hip joint aspiration for WBC, culture, gram stain, fungal culture and AFB culture as soon as possible and related my recommendations to Dr. Hampton.    I am concerned about the persistent bacteremia from 10/7/2022 and 10/9/2022 and went ahead and gave 1 dose of gentamicin synergy at 5 mg/kg IV times once now and to continue with ertapenem 1 g IV every 24 hours.    WBC slightly improved at 11.32.  Very difficult case we will continue to follow closely and repeat blood cultures on 10/11/2022 or 10/12/2022 to document clearing of bacteremia.    Patient will need outpatient infectious disease follow-up after discharge.        Code Status:   Code Status and Medical Interventions:   Ordered at: 10/07/22 1416     Code Status (Patient has no pulse and is not breathing):    CPR (Attempt to Resuscitate)     Medical Interventions (Patient has pulse or is breathing):    Full Support       Elisa Verduzco PA-C  10/10/22  09:38 EDT    Electronically signed by Elisa Verduzco PA-C, 10/10/22, 9:44 AM EDT.    Electronically signed by Olena Ramsay MD, 10/10/22, 11:11 AM EDT.

## 2022-10-10 NOTE — PLAN OF CARE
Goal Outcome Evaluation:              Outcome Evaluation: Patient has rested in bed this shift, PRN medications given for complaints of pain, no other request or complaints or request at this time, VSS, Will continue to monitor.

## 2022-10-11 LAB
ANION GAP SERPL CALCULATED.3IONS-SCNC: 11.1 MMOL/L (ref 5–15)
BUN SERPL-MCNC: 15 MG/DL (ref 8–23)
BUN/CREAT SERPL: 14.6 (ref 7–25)
CALCIUM SPEC-SCNC: 8.7 MG/DL (ref 8.6–10.5)
CHLORIDE SERPL-SCNC: 99 MMOL/L (ref 98–107)
CO2 SERPL-SCNC: 23.9 MMOL/L (ref 22–29)
CREAT SERPL-MCNC: 1.03 MG/DL (ref 0.76–1.27)
CRP SERPL-MCNC: 18.8 MG/DL (ref 0–0.5)
DEPRECATED RDW RBC AUTO: 47.9 FL (ref 37–54)
EGFRCR SERPLBLD CKD-EPI 2021: 77.2 ML/MIN/1.73
ERYTHROCYTE [DISTWIDTH] IN BLOOD BY AUTOMATED COUNT: 14.7 % (ref 12.3–15.4)
GLUCOSE SERPL-MCNC: 118 MG/DL (ref 65–99)
HCT VFR BLD AUTO: 37.6 % (ref 37.5–51)
HGB BLD-MCNC: 12.4 G/DL (ref 13–17.7)
MCH RBC QN AUTO: 29.5 PG (ref 26.6–33)
MCHC RBC AUTO-ENTMCNC: 33 G/DL (ref 31.5–35.7)
MCV RBC AUTO: 89.3 FL (ref 79–97)
PLATELET # BLD AUTO: 239 10*3/MM3 (ref 140–450)
PMV BLD AUTO: 9.9 FL (ref 6–12)
POTASSIUM SERPL-SCNC: 3.9 MMOL/L (ref 3.5–5.2)
RBC # BLD AUTO: 4.21 10*6/MM3 (ref 4.14–5.8)
SODIUM SERPL-SCNC: 134 MMOL/L (ref 136–145)
WBC NRBC COR # BLD: 10.83 10*3/MM3 (ref 3.4–10.8)

## 2022-10-11 PROCEDURE — 94799 UNLISTED PULMONARY SVC/PX: CPT

## 2022-10-11 PROCEDURE — 94761 N-INVAS EAR/PLS OXIMETRY MLT: CPT

## 2022-10-11 PROCEDURE — 25010000002 HEPARIN (PORCINE) PER 1000 UNITS: Performed by: INTERNAL MEDICINE

## 2022-10-11 PROCEDURE — 85027 COMPLETE CBC AUTOMATED: CPT

## 2022-10-11 PROCEDURE — 99232 SBSQ HOSP IP/OBS MODERATE 35: CPT

## 2022-10-11 PROCEDURE — 97162 PT EVAL MOD COMPLEX 30 MIN: CPT

## 2022-10-11 PROCEDURE — 80048 BASIC METABOLIC PNL TOTAL CA: CPT

## 2022-10-11 PROCEDURE — 25010000002 ERTAPENEM PER 500 MG: Performed by: INTERNAL MEDICINE

## 2022-10-11 PROCEDURE — 99232 SBSQ HOSP IP/OBS MODERATE 35: CPT | Performed by: INTERNAL MEDICINE

## 2022-10-11 PROCEDURE — 86140 C-REACTIVE PROTEIN: CPT

## 2022-10-11 RX ADMIN — HEPARIN SODIUM 5000 UNITS: 5000 INJECTION INTRAVENOUS; SUBCUTANEOUS at 20:42

## 2022-10-11 RX ADMIN — Medication 5000 UNITS: at 08:05

## 2022-10-11 RX ADMIN — ERTAPENEM SODIUM 1 G: 1 INJECTION, POWDER, LYOPHILIZED, FOR SOLUTION INTRAMUSCULAR; INTRAVENOUS at 11:01

## 2022-10-11 RX ADMIN — HEPARIN SODIUM 5000 UNITS: 5000 INJECTION INTRAVENOUS; SUBCUTANEOUS at 05:12

## 2022-10-11 RX ADMIN — PRENATAL VIT W/ FE FUMARATE-FA TAB 27-0.8 MG 1 TABLET: 27-0.8 TAB at 08:04

## 2022-10-11 RX ADMIN — Medication 10 ML: at 20:41

## 2022-10-11 RX ADMIN — OXYCODONE HYDROCHLORIDE AND ACETAMINOPHEN 1 TABLET: 5; 325 TABLET ORAL at 13:52

## 2022-10-11 RX ADMIN — OXYCODONE HYDROCHLORIDE AND ACETAMINOPHEN 1 TABLET: 5; 325 TABLET ORAL at 09:46

## 2022-10-11 RX ADMIN — OXYCODONE HYDROCHLORIDE AND ACETAMINOPHEN 1 TABLET: 5; 325 TABLET ORAL at 20:40

## 2022-10-11 RX ADMIN — TAMSULOSIN HYDROCHLORIDE 0.4 MG: 0.4 CAPSULE ORAL at 08:05

## 2022-10-11 RX ADMIN — OXYCODONE HYDROCHLORIDE AND ACETAMINOPHEN 1 TABLET: 5; 325 TABLET ORAL at 05:16

## 2022-10-11 RX ADMIN — METOPROLOL TARTRATE 25 MG: 25 TABLET, FILM COATED ORAL at 08:04

## 2022-10-11 RX ADMIN — Medication 10 ML: at 08:05

## 2022-10-11 RX ADMIN — OXYCODONE HYDROCHLORIDE AND ACETAMINOPHEN 1 TABLET: 5; 325 TABLET ORAL at 00:35

## 2022-10-11 RX ADMIN — AMLODIPINE BESYLATE 10 MG: 10 TABLET ORAL at 08:05

## 2022-10-11 RX ADMIN — HEPARIN SODIUM 5000 UNITS: 5000 INJECTION INTRAVENOUS; SUBCUTANEOUS at 13:23

## 2022-10-11 RX ADMIN — METOPROLOL TARTRATE 25 MG: 25 TABLET, FILM COATED ORAL at 20:40

## 2022-10-11 NOTE — PLAN OF CARE
Goal Outcome Evaluation:               Pt resting in bed at this time. Requested minimized awakenings this shift so he can rest. PRN medication given for pain. Cath care completed. No c/o at this time, VSS, no s/sx of discomfort, will continue to monitor for changes.

## 2022-10-11 NOTE — PLAN OF CARE
Goal Outcome Evaluation:              Outcome Evaluation: Pt is resting in bed at this time. Pt complained of pain, PRN medicine given per MAR. Pt worked with PT this shift. No acute changes noted at this time. Will continue plan of care.

## 2022-10-11 NOTE — PROGRESS NOTES
Patient Identification:  Name:  Soliatrio Mcdaniel  Age:  71 y.o.  Sex:  male  :  1950  MRN:  [id number]  Visit Number:  [id number]  Primary Care Provider:  Corrine Mcdaniel APRN    Length of stay:  4    Subjective/Interval History/Consultants/Procedures     Chief complaint: Right Hip pain s/p mechanical fall at home prior to admission     Subjective/Interval History:  Solitario Mcdaniel is our 71 y.o. male patient admitted on 10/7/2022 with sepsis likely secondary to acute cystitis in setting of chronic indwelling jorge catheter. He has pMH significant for arthritis, essential hypertension, and BPH. For further and complete admitting details, please see admission H&P.     He was [facility] for further monitoring, evaluation, and treatment.  Initial concern was that sepsis possibly related to septic right hip in setting of severe right hip pain with new inability to bear weight on right lower extremity.  Imaging revealed advanced osteoarthritic changes and joint space collapse of the right hip.  There was subchondral cystic change of both the femoral head and acetabulum noted.  No characteristic findings of acute AVN; findings may represent sequela of chronic AVN and subcortical collapse.  There also was a small right hip joint effusion, likely reactive secondary to advanced osteoarthritic change.  Orthopedic surgery was consulted for further evaluation.  PT also consulted for discharge placement assessment, as he currently lives with his daughter and son-in-law who work full-time and are unable to provide assistance for the patient at home.  Patient met sepsis criteria on admission with WBCs greater than 12 and CRP elevation. Infection markers trending down. Upon exam, it was evident that patient had chronic indwelling Jorge catheter.  He states that Jorge catheter was replaced on 10/6.  Urinalysis obtained, grossly abnormal.  Urine culture growing >100K ESBL E. Coli.  Peripheral blood  cultures obtained in the ED also with 2/2 bottles growing Klebsiella pneumoniae and ESBL.  Have consulted infectious disease for further evaluation and assistance.  This patient received IV vancomycin and Zosyn in the ED.  Currently on IV Invanz. The patient has remained afebrile with stable vital signs.  Pain currently being managed with as needed Percocet. Orthopedic surgery currently not recommending total hip arthroplasty. They have recommended CT-guided aspiration of small right hip joint effusion. Diagnostic arthrocentesis of right hip performed on 10/10 by IR. Patient tolerated well. Right hip synovial fluid culture on 10/10/2022 is so far showing no organisms.  Right hip synovial fluid cell count on 10/10/2022 showed 333,780 nucleated cells.  Repeat Blood cultures on 10/9/2022 are so far showing gram-negative bacilli. Recommending reevaluation by Dr. Alberto for potential washout.     Procedures/Imaging:  • Chest xray  • Xray of right hip/pelvis  • CT pelvis without contrast  • MRI right hip  • CT-guided aspiration of right hip joint effusion      Consults:  • Orthopedic Surgery  • Infectious Disease       On today's exam, patient was resting in bed with no signs or symptoms of acute distress noted.  He currently denies any chest pain, shortness of breath, abdominal pain.  States that simethicone was sufficient to relieve gas pain yesterday.  States that he was able to eat breakfast this morning.  Denies any nausea or vomiting.  Right hip remains tender to touch.  No external erythema or edema appreciated.  Plan discussed with patient; voices agreement with no further questions or concerns at this time.     No acute events reported overnight. Room location at the time of evaluation was 338.  Discussed with attending physician, Petty Mcknight MD.  ----------------------------------------------------------------------------------------------------------------------  [facility] Meds:  amLODIPine,  10 mg, Oral, Daily  ertapenem, 1 g, Intravenous, Q24H  heparin (porcine), 5,000 Units, Subcutaneous, Q8H  metoprolol tartrate, 25 mg, Oral, BID  polyethylene glycol, 17 g, Oral, Daily  prenatal vitamin 27-0.8, 1 tablet, Oral, Daily  sodium chloride, 10 mL, Intravenous, Q12H  tamsulosin, 0.4 mg, Oral, Daily  vitamin D3, 5,000 Units, Oral, Daily         ----------------------------------------------------------------------------------------------------------------------      Objective     Vital Signs:  Temp:  [98 °F (36.7 °C)-98.5 °F (36.9 °C)] 98.5 °F (36.9 °C)  Heart Rate:  [64-90] 81  Resp:  [16-18] 18  BP: (129-169)/(55-83) 164/79      10/07/22  1008 10/07/22  1622   Weight: 109 kg (240 lb 4.8 oz) 110 kg (242 lb 6.4 oz)     Body mass index is 42.94 kg/m².    Intake/Output Summary (Last 24 hours) at 10/11/2022 1105  Last data filed at 10/11/2022 0807  Gross per 24 hour   Intake 480 ml   Output 1400 ml   Net -920 ml     I/O this shift:  In: 240 [P.O.:240]  Out: -   Diet Regular  ----------------------------------------------------------------------------------------------------------------------    Physical Exam  Vitals and nursing note reviewed.   Constitutional:       General: He is awake. He is not in acute distress.     Appearance: He is not diaphoretic.      Comments: Room air.   HENT:      Head: Normocephalic and atraumatic.      Mouth/Throat:      Mouth: Mucous membranes are moist.      Pharynx: Oropharynx is clear.   Eyes:      Extraocular Movements: Extraocular movements intact.      Conjunctiva/sclera: Conjunctivae normal.   Cardiovascular:      Rate and Rhythm: Normal rate and regular rhythm.      Pulses: Normal pulses.           Dorsalis pedis pulses are 2+ on the right side and 2+ on the left side.      Heart sounds: Normal heart sounds. No murmur heard.    No friction rub.   Pulmonary:      Effort: Pulmonary effort is normal. No accessory muscle usage, respiratory distress or retractions.      Breath  sounds: Normal breath sounds. No wheezing, rhonchi or rales.   Abdominal:      General: Bowel sounds are normal. There is no distension.      Palpations: Abdomen is soft.      Tenderness: There is no abdominal tenderness. There is no guarding.   Genitourinary:     Comments: Martin catheter in place.  Musculoskeletal:      Cervical back: Neck supple. No rigidity.      Right lower le+ Edema present.      Left lower le+ Edema present.      Comments: Right hip appearing non edematous without erythema or ecchymosis.    Skin:     General: Skin is warm and dry.      Capillary Refill: Capillary refill takes 2 to 3 seconds.   Neurological:      Mental Status: He is alert and oriented to person, place, and time.      Cranial Nerves: No facial asymmetry.      Sensory: No sensory deficit.      Motor: Weakness (chronic RUE) present.   Psychiatric:         Attention and Perception: Attention normal.         Mood and Affect: Mood normal.         Speech: Speech normal.         Behavior: Behavior is cooperative.         Thought Content: Thought content normal.         Cognition and Memory: Cognition normal.       ----------------------------------------------------------------------------------------------------------------------      Results from last 7 days   Lab Units 10/11/22  0210 10/10/22  1103 10/10/22  0111 10/09/22  0231 10/08/22  0055 10/07/22  1138 10/07/22  1045 10/06/22  0753   CRP mg/dL 18.80*  --   --  17.62*  --  21.28*  --   --    LACTATE mmol/L  --   --   --   --   --   --  0.9  --    WBC 10*3/mm3 10.83*  --  11.32* 12.59*   < >  --  15.83* 13.23*   HEMOGLOBIN g/dL 12.4*  --  11.7* 12.1*   < >  --  13.2 13.1   HEMATOCRIT % 37.6  --  35.6* 35.8*   < >  --  38.5 38.8   MCV fL 89.3  --  88.8 88.2   < >  --  87.1 88.2   MCHC g/dL 33.0  --  32.9 33.8   < >  --  34.3 33.8   PLATELETS 10*3/mm3 239  --  205 193   < >  --  203 179   INR   --  1.04  --   --   --   --   --  1.02    < > = values in this interval not  displayed.         Results from last 7 days   Lab Units 10/11/22  0210 10/10/22  0112 10/09/22  0231 10/08/22  0055 10/07/22  1138 10/06/22  0753   SODIUM mmol/L 134* 136 135*   < > 141 141   POTASSIUM mmol/L 3.9 4.2 4.0   < > 4.3 4.4   CHLORIDE mmol/L 99 102 100   < > 103 105   CO2 mmol/L 23.9 24.5 25.2   < > 23.3 23.2   BUN mg/dL 15 19 20   < > 23 32*   CREATININE mg/dL 1.03 1.10 1.24   < > 1.15 1.38*   CALCIUM mg/dL 8.7 8.5* 8.7   < > 9.4 9.2   GLUCOSE mg/dL 118* 125* 120*   < > 134* 166*   ALBUMIN g/dL  --   --   --   --  3.61 3.80   BILIRUBIN mg/dL  --   --   --   --  0.9 0.9   ALK PHOS U/L  --   --   --   --  70 78   AST (SGOT) U/L  --   --   --   --  61* 100*   ALT (SGPT) U/L  --   --   --   --  28 33    < > = values in this interval not displayed.   Estimated Creatinine Clearance: 71.6 mL/min (by C-G formula based on SCr of 1.03 mg/dL).  No results found for: AMMONIA      Blood Culture   Date Value Ref Range Status   10/09/2022 Gram Negative Bacilli (C)  Preliminary   10/09/2022 Gram Negative Bacilli (C)  Preliminary   10/07/2022 Klebsiella pneumoniae ESBL (C)  Final     Comment:       Consider infectious disease consult.  Susceptibility results may not correlate to clinical outcomes.  For ESBL-producing infections in the blood, a carbapenem is recommended as first-line therapy for optimal clinical outcomes.   10/07/2022 Klebsiella pneumoniae ESBL (C)  Final     Comment:       Consider infectious disease consult.  Susceptibility results may not correlate to clinical outcomes.  For ESBL-producing infections in the blood, a carbapenem is recommended as first-line therapy for optimal clinical outcomes.     Urine Culture   Date Value Ref Range Status   10/07/2022 >100,000 CFU/mL Klebsiella pneumoniae ESBL (A)  Final     Comment:       Consider infectious disease consult.  Susceptibility results may not correlate to clinical outcomes.      ----------------------------------------------------------------------------------------------------------------------  Imaging Results (Last 24 Hours)     Procedure Component Value Units Date/Time    FL Guided Aspiration Joint [[id number]] Collected: 10/10/22 1510     Updated: 10/10/22 1536    Narrative:      EXAMINATION: FL GUIDED ASPIRATION JOINT-      CLINICAL INDICATION: septic arthritis; M00.9-Pyogenic arthritis,  unspecified        COMPARISON: MRI performed 10/07/2022.     Consent:  Patient demonstrated decisional capacity.  The procedure and possible complications were discussed with the  patient. He gave consent to continue.     Fluoroscopy was used to localize a site for entry. 23 seconds of  fluoroscopy was used. Site for entry was prepped and draped in usual  sterile fashion. Topical anesthesia was provided with lidocaine.  A 22-gauge spinal needle was then inserted into the right hip joint.  Approximately 1-2 mL of thick reddish fluid was aspirated.       Impression:      Technically successful diagnostic aspiration of the right  hip joint.      This report was finalized on 10/10/2022 3:34 PM by Dr. Chidi Mackay MD.           ----------------------------------------------------------------------------------------------------------------------   I have reviewed the above laboratory values for 10/11/22    Assessment/Plan     Active Hospital Problems    Diagnosis  POA   • Septic hip (HCC) [M00.9]  Yes         ASSESSMENT/PLAN:  -Sepsis criteria met on admission with WBC>12 and C-RP elevation likely 2/2 acute ESBL E. Coli UTI  -Klebsiella pneumoniae and ESBL bacteremia  -Concern for septic arthritis of the right hip  -C-RP 21.28, WBCs 15.83, and procalcitonin 4.29 on admission.  -Infection markers trending down.   -Urinalysis with + nitrites, 1+ leukocytes, 31-50 WBC's and 4+ bacteria.  -Urine culture added, grew >100K ESBL E. Coli.   -Received IV vancomycin and zosyn initially in ED.  -Currently  remaining on Invanz.   -Repeat peripheral blood cultures x2 drawn 10/9; currently growing gram negative bacilli. Have discussed with ID.   -ID following; greatly appreciate their assistance.   -CT pelvis without contrast revealed marked deformity of the right humeral head likely related to chronic avascular necrosis or advanced osteoarthritis. No fracture.  -MRI of right hip obtained revealing advanced osteoarthritic changes, joint space collapse. No characteristic findings of acute AVN noted; possible chronic AVN and subcortical collapse. Also noted small right hip joint effusion.  -Orthopedic surgery following. Greatly appreciate their assistance.   -Diagnostic arthrocentesis completed on 10/10 by IR; Right hip synovial fluid culture obtained on 10/10/2022.  Right hip synovial fluid cell count on 10/10/2022 showed 333,780 nucleated cells. Recommending reevaluation by Dr. Alberto for potential washout.  -Continue gentle IV hydration with NS @ 50 ml/hr.   -Pain control with PRN Percocet.   -PRN Flexeril for muscle spasms.  -PT consult has been placed.   -Afebrile; closely monitor VS per hospital policy.  -Tylenol as needed for fever or mild pain.   -Repeat CBC in the a.m.     -Essential hypertension  -BP appears controlled currently.  -Continue metoprolol 25 mg BID and Norvasc 10 mg daily with appropriate holding parameters to prevent hypotension and/or bradycardia.   -Closely monitor BP per hospital protocol, titrate medications as necessary.     -BPH  -Urinary retention  -Chronic indwelling jorge in place, draining light yellow urine.  -Replaced on 10/6 per patient report.   -Denies symptoms of dysuria or flank pain on exam.   -Catheter care per protocol.   -Continue Flomax.         -----------  -DVT prophylaxis: Subcutaneous heparin  -Activity: Bedrest  -Disposition plans/anticipated needs: Pending clinical course.PT consult placed. Will likely require SNF placement. CM has been consulted for assistance with  discharge plans.   -Diet: Regular  -Home supplemental O2: N/A currently stable on room air.         High risk secondary to sepsis on admission, bacteremia, acute cystitis with chronic indwelling jorge catheter, and concern for septic right hip.        Tania Campos, KANDICE  10/11/22  11:05 EDT  Pager #[phone]

## 2022-10-11 NOTE — THERAPY EVALUATION
Acute Care - Physical Therapy Initial Evaluation   [place]     Patient Name: Solitario Mcdaniel  : 1950  MRN: [id number]  Today's Date: 10/11/2022   Onset of Illness/Injury or Date of Surgery: 10/07/22  Visit Dx:     ICD-10-CM ICD-9-CM   1. Septic hip (HCC)  M00.9 711.05     Patient Active Problem List   Diagnosis   • Septic hip (HCC)     Past Medical History:   Diagnosis Date   • Arthritis    • BPH (benign prostatic hyperplasia)    • Hypertension    • Osteoarthritis      Past Surgical History:   Procedure Laterality Date   • COLONOSCOPY     • TONSILLECTOMY     • VASECTOMY       PT Assessment (last 12 hours)     PT Evaluation and Treatment     Row Name 10/11/22 1200          Physical Therapy Time and Intention    Subjective Information complains of;pain  -     Document Type evaluation  -     Mode of Treatment physical therapy  -     Patient Effort other (see comments)  Very limited due to pain  -     Symptoms Noted During/After Treatment increased pain  -     Comment Patient was pleasant but is very limited in function due to pain.  He is resistant to movement and anterior weight shifting once sitting EOB.  -     Row Name 10/11/22 1200          General Information    Patient Profile Reviewed yes  -KP     Onset of Illness/Injury or Date of Surgery 10/07/22  -     Referring Physician [place]  -     Patient Observations alert;cooperative;agree to therapy  -     Patient/Family/Caregiver Comments/Observations Pt reports inability to perform activities due to intense pain  -     General Observations of Patient Pt supine in bed with pillow under left hip  -     Prior Level of Function independent:;all household mobility;transfer;gait;bed mobility  -     Equipment Currently Used at Home walker, rolling;wheelchair  Pt claims to have used FWW with left hand only due to previous deficit on right hand with decreased   -     Existing Precautions/Restrictions fall;weight bearing;other (see  comments)  WBAT to right LE  -     Row Name 10/11/22 1200          Previous Level of Function/Home Environm    Bed Mobility, Premorbid Functional Level independent  -     Transfers, Premorbid Functional Level independent  -     Household Ambulation, Premorbid Functional Level uses device or equipment  -     Stairs, Premorbid Functional Level dependent;uses device or equipment;other (see comments)  Pt reports son and grandson would have to lift him in the wheelchair down the steps  -     Community Ambulation, Premorbid Functional Level uses wheelchair  -     Row Name 10/11/22 1200          Living Environment    Current Living Arrangements home  -     Home Accessibility stairs to enter home  -     People in Home child(amanda), adult  -     Row Name 10/11/22 1200          Home Main Entrance    Number of Stairs, Main Entrance seven  -     Row Name 10/11/22 1200          Pain    Pretreatment Pain Rating 8/10  -KP     Posttreatment Pain Rating 9/10  -KP     Pain Location - Side/Orientation Right  -     Pain Location - hip  -     Pain Intervention(s) Repositioned;Other (Comment)  RN reports pain medication was given this morning.  Pt confirms this.  -     Row Name 10/11/22 1200          Cognition    Affect/Mental Status (Cognition) WFL  -     Orientation Status (Cognition) oriented x 4  -KP     Follows Commands (Cognition) other (see comments);follows one-step commands;75-90% accuracy  Difficulty following commands for improved body positioning during sitting EOB due to pain  -     Personal Safety Interventions fall prevention program maintained;muscle strengthening facilitated;nonskid shoes/slippers when out of bed;supervised activity  -     Row Name 10/11/22 1200          Range of Motion (ROM)    Range of Motion other (see comments)  Pt does not tolerate ROM to R LE  -     Row Name 10/11/22 1200          Strength Comprehensive (MMT)    Comment, General Manual Muscle Testing (MMT) Assessment  R LE trace movement at toes, ankle observed but no movement at knee or hip.  Quad activation on R with sitting EOB in order to prevent LE from bending to the floor.  L LE observed 2-/5 hip and 3-/5 knee and ankle.  -     Row Name 10/11/22 1200          Mobility    Extremity Weight-bearing Status right lower extremity  -KP     Right Lower Extremity (Weight-bearing Status) weight-bearing as tolerated (WBAT)  -     Row Name 10/11/22 1200          Bed Mobility    Bed Mobility supine-sit-supine;rolling left;rolling right;scooting/bridging  -     Rolling Left [place] (Bed Mobility) dependent (less than 25% patient effort);2 person assist  -KP     Rolling Right [place] (Bed Mobility) dependent (less than 25% patient effort);2 person assist  -KP     Scooting/Bridging [place] (Bed Mobility) dependent (less than 25% patient effort);2 person assist  -KP     Supine-Sit-Supine [place] (Bed Mobility) dependent (less than 25% patient effort);2 person assist  -KP     Bed Mobility, Safety Issues decreased use of legs for bridging/pushing;cognitive deficits limit understanding;decreased use of arms for pushing/pulling;impaired trunk control for bed mobility  -     Assistive Device (Bed Mobility) bed rails;draw sheet;head of bed elevated  -     Row Name 10/11/22 1200          Transfers    Transfers other (see comments)  Unable to attempt transfers due to poor sitting posture and tolerance EOB  -     Row Name 10/11/22 1200          Balance    Balance Assessment sitting static balance  -     Static Sitting Balance other (see comments)  Dep initially progressing to min/modA with cues for anterior weight shifting  -     Position, Sitting Balance sitting edge of bed  -     Row Name             Wound 10/10/22 1500 Right anterior hip Incision    Wound - Properties Group Placement Date: 10/10/22  -VL Placement Time: 1500  -VL Present on Hospital Admission: N  -VL Side: Right  -VL Orientation: anterior   -VL Location: hip  -VL Primary Wound Type: Incision  -VL Additional Comments: From Joint Aspiration on 10/10/2022  -VL    Retired Wound - Properties Group Placement Date: 10/10/22  -VL Placement Time: 1500  -VL Present on Hospital Admission: N  -VL Side: Right  -VL Orientation: anterior  -VL Location: hip  -VL Primary Wound Type: Incision  -VL Additional Comments: From Joint Aspiration on 10/10/2022  -VL    Retired Wound - Properties Group Date first assessed: 10/10/22  -VL Time first assessed: 1500  -VL Present on Hospital Admission: N  -VL Side: Right  -VL Location: hip  -VL Primary Wound Type: Incision  -VL Additional Comments: From Joint Aspiration on 10/10/2022  -VL    Row Name 10/11/22 1200          Plan of Care Review    Plan of Care Reviewed With patient  -     Outcome Evaluation PT evaluation complete.  Pt's limiting factor is intense pain in R hip.  He required dependent assistx2 for bed mobility and supine to sit.  He progressed to min/modA EOB but was unable to sit upright and sat in posterior tilt.  Unable to attempt transfer training.  He will benefit from further skilled PT to improve ROM, strength and all functional mobility.  D/c recommendation is continued rehab at [facility].  -     Row Name 10/11/22 1200          Positioning and Restraints    Pre-Treatment Position in bed  -KP     Post Treatment Position bed  -KP     In Bed supine;call light within reach;encouraged to call for assist;exit alarm on;side rails up x3;R heel elevated  Pillow under right hip.  Lines in tact and needs in reach.  -     Row Name 10/11/22 1200          Therapy Assessment/Plan (PT)    Patient/Family Therapy Goals Statement (PT) Pt would like to return home but at this time is dependent for mobility  -     Functional Level at Time of Evaluation (PT) dependent  -     PT Diagnosis (PT) Decreased ROM, strength, and increased assistance with all mobility  -     Rehab Potential (PT) fair, will monitor progress closely  -      Criteria for Skilled Interventions Met (PT) yes;meets criteria;skilled treatment is necessary  -     Therapy Frequency (PT) 3 times/wk  -     Predicted Duration of Therapy Intervention (PT) 2 wks  -     Problem List (PT) problems related to;balance;coordination;mobility;motor control;range of motion (ROM);strength;pain;postural control  -     Activity Limitations Related to Problem List (PT) unable to ambulate safely;unable to transfer safely  -     Row Name 10/11/22 1200          PT Evaluation Complexity    History, PT Evaluation Complexity 3 or more personal factors and/or comorbidities  -     Examination of Body Systems (PT Eval Complexity) total of 3 or more elements  -     Clinical Presentation (PT Evaluation Complexity) evolving  -     Clinical Decision Making (PT Evaluation Complexity) moderate complexity  -     Overall Complexity (PT Evaluation Complexity) moderate complexity  -     Row Name 10/11/22 1200          Physical Therapy Goals    Bed Mobility Goal Selection (PT) bed mobility, PT goal 1  -     Transfer Goal Selection (PT) transfer, PT goal 1  -     Gait Training Goal Selection (PT) gait training, PT goal 1  -     Row Name 10/11/22 1200          Bed Mobility Goal 1 (PT)    Activity/Assistive Device (Bed Mobility Goal 1, PT) sit to supine/supine to sit  -     [place] Level/Cues Needed (Bed Mobility Goal 1, PT) moderate assist (50-74% patient effort)  -     Time Frame (Bed Mobility Goal 1, PT) long term goal (LTG);by discharge  -     Row Name 10/11/22 1200          Transfer Goal 1 (PT)    Activity/Assistive Device (Transfer Goal 1, PT) sit-to-stand/stand-to-sit;walker, rolling  -     [place] Level/Cues Needed (Transfer Goal 1, PT) moderate assist (50-74% patient effort)  -     Time Frame (Transfer Goal 1, PT) long term goal (LTG);by discharge  -     Row Name 10/11/22 1200          Gait Training Goal 1 (PT)    Activity/Assistive Device (Gait Training  Goal 1, PT) assistive device use;walker, rolling  -     [place] Level (Gait Training Goal 1, PT) moderate assist (50-74% patient effort)  -     Distance (Gait Training Goal 1, PT) 2ft  -KP     Time Frame (Gait Training Goal 1, PT) long term goal (LTG);by discharge  -           User Key  (r) = Recorded By, (t) = Taken By, (c) = Cosigned By    Initials Name Provider Type    Ashley Garcia PT Physical Therapist    Uma Jeff RN Registered Nurse                  PT Recommendation and Plan  Anticipated Discharge Disposition (PT): skilled nursing facility  Planned Therapy Interventions (PT): balance training, bed mobility training, gait training, home exercise program, manual therapy techniques, motor coordination training, neuromuscular re-education, patient/family education, postural re-education, ROM (range of motion), stair training, strengthening, stretching, transfer training, wheelchair management/propulsion training  Therapy Frequency (PT): 3 times/wk  Plan of Care Reviewed With: patient  Outcome Evaluation: PT evaluation complete.  Pt's limiting factor is intense pain in R hip.  He required dependent assistx2 for bed mobility and supine to sit.  He progressed to min/modA EOB but was unable to sit upright and sat in posterior tilt.  Unable to attempt transfer training.  He will benefit from further skilled PT to improve ROM, strength and all functional mobility.  D/c recommendation is continued rehab at SNF.       Time Calculation:    PT Charges     Row Name 10/11/22 1244             Time Calculation    PT Received On 10/11/22  -      PT Goal Re-Cert Due Date 10/25/22  -            User Key  (r) = Recorded By, (t) = Taken By, (c) = Cosigned By    Initials Name Provider Type    Ashley Garcia PT Physical Therapist              Therapy Charges for Today     Code Description Service Date Service Provider Modifiers Qty    07663282833 HC PT EVAL MOD COMPLEXITY 4 10/11/2022 Rory  Ashley, PT GP 1          PT G-Codes  AM-PAC 6 Clicks Score (PT): 7    Ashley Valadez, PT  10/11/2022

## 2022-10-11 NOTE — PROGRESS NOTES
PROGRESS NOTE         Patient Identification:  Name:  Solitario Mcdaniel  Age:  71 y.o.  Sex:  male  :  1950  MRN:  [id number]  Visit Number:  [id number]  Primary Care Provider:  Corrine Mcdaniel APRN         LOS: 4 days       ----------------------------------------------------------------------------------------------------------------------  Subjective       Chief Complaints:    Hip Pain        Interval History:      The patient is resting comfortably in bed on room air in no apparent distress.  No new complaints at this time, but patient does continue to have right hip pain, especially upon internal rotation.  Status post right hip joint aspiration on 10/10/2022.  Nurse reports no issues overnight.  Afebrile, no diarrhea.  CRP is worsened to 18.80.  WBC is improved at 10.83.  Right hip synovial fluid culture on 10/10/2022 is so far showing no organisms.  Right hip synovial fluid cell count on 10/10/2022 showed 333,780 nucleated cells.  Blood cultures on 10/9/2022 are so far showing gram-negative bacilli.    Review of Systems:    Constitutional: no fever, chills and night sweats. No appetite change or unexpected weight change.  Fatigue and generalized weakness.  Eyes: no eye drainage, itching or redness.  HEENT: no mouth sores, dysphagia or nose bleed.  Respiratory: no for shortness of breath, cough or production of sputum.  Cardiovascular: no chest pain, no palpitations, no orthopnea.   Gastrointestinal: no nausea, vomiting or diarrhea. No abdominal pain, hematemesis or rectal bleeding.  Genitourinary: no dysuria or polyuria.  Hematologic/lymphatic: no lymph node abnormalities, no easy bruising or easy bleeding.  Musculoskeletal: Arthralgias.  Gait problems.  Right hip pain.  Skin: No rash and no itching.  Neurological: no loss of consciousness, no seizure, no headache.  Chronic right upper extremity weakness.  Behavioral/Psych: no depression or suicidal ideation.  Endocrine: no hot  flashes.  Immunologic: negative.      ----------------------------------------------------------------------------------------------------------------------      Objective       [facility] Meds:  amLODIPine, 10 mg, Oral, Daily  ertapenem, 1 g, Intravenous, Q24H  heparin (porcine), 5,000 Units, Subcutaneous, Q8H  metoprolol tartrate, 25 mg, Oral, BID  polyethylene glycol, 17 g, Oral, Daily  prenatal vitamin 27-0.8, 1 tablet, Oral, Daily  sodium chloride, 10 mL, Intravenous, Q12H  tamsulosin, 0.4 mg, Oral, Daily  vitamin D3, 5,000 Units, Oral, Daily         ----------------------------------------------------------------------------------------------------------------------    Vital Signs:  Temp:  [98 °F (36.7 °C)-98.5 °F (36.9 °C)] 98.5 °F (36.9 °C)  Heart Rate:  [64-90] 81  Resp:  [16-18] 18  BP: (129-169)/(55-83) 164/79  Mean Arterial Pressure (Non-Invasive) for the past 24 hrs (Last 3 readings):   Noninvasive MAP (mmHg)   10/11/22 0335 109   10/10/22 2242 84   10/10/22 1847 90     SpO2 Percentage    10/11/22 0600 10/11/22 1000 10/11/22 1020   SpO2: 95% 95% 94%     SpO2:  [94 %-97 %] 94 %  on   ;   Device (Oxygen Therapy): room air    Body mass index is 42.94 kg/m².  Wt Readings from Last 3 Encounters:   10/07/22 110 kg (242 lb 6.4 oz)   10/06/22 109 kg (240 lb)   06/17/21 118 kg (260 lb)        Intake/Output Summary (Last 24 hours) at 10/11/2022 1038  Last data filed at 10/11/2022 0807  Gross per 24 hour   Intake 693.18 ml   Output 1400 ml   Net -706.82 ml     Diet Regular  ----------------------------------------------------------------------------------------------------------------------      Physical Exam:    Constitutional:  male is sitting up in bed on room air in no apparent distress.  HENT:  Head: Normocephalic and atraumatic.  Mouth:  Moist mucous membranes.  Poor dentition.  Eyes:  Conjunctivae and EOM are normal.  No scleral icterus.  Neck:  Neck supple.  No JVD present.     Cardiovascular:  Normal rate, regular rhythm and normal heart sounds with no murmur. No edema.  Pulmonary/Chest:  No respiratory distress, no wheezes, no crackles, with normal breath sounds and good air movement.  Abdominal:  Soft.  Bowel sounds are normal.  No distension and no tenderness. Large hernia of the left sided abdomen.  Chronic indwelling Martin catheter with clear yellow urine in collection bag.  Musculoskeletal:  No tenderness and no deformity.  No swelling or redness of joints.  1+ edema of bilateral lower extremities with venous stasis changes.  Right hip without erythema, edema, warmth, or wound.  Pain with any palpation or movement, especially with internal rotation.  Neurological:  Alert and oriented to person, place, and time.  No facial droop.  No slurred speech.   Right upper extremity chronic weakness.  Skin:  Skin is warm and dry.  No rash noted.  No pallor.  Venous stasis changes of bilateral lower extremities.  Psychiatric:  Normal mood and affect.  Behavior is normal.       ----------------------------------------------------------------------------------------------------------------------            Results from last 7 days   Lab Units 10/11/22  0210 10/10/22  1103 10/10/22  0111 10/09/22  0231 10/08/22  0055 10/07/22  1138 10/07/22  1045 10/06/22  0753   CRP mg/dL 18.80*  --   --  17.62*  --  21.28*  --   --    LACTATE mmol/L  --   --   --   --   --   --  0.9  --    WBC 10*3/mm3 10.83*  --  11.32* 12.59*   < >  --  15.83* 13.23*   HEMOGLOBIN g/dL 12.4*  --  11.7* 12.1*   < >  --  13.2 13.1   HEMATOCRIT % 37.6  --  35.6* 35.8*   < >  --  38.5 38.8   MCV fL 89.3  --  88.8 88.2   < >  --  87.1 88.2   MCHC g/dL 33.0  --  32.9 33.8   < >  --  34.3 33.8   PLATELETS 10*3/mm3 239  --  205 193   < >  --  203 179   INR   --  1.04  --   --   --   --   --  1.02    < > = values in this interval not displayed.     Results from last 7 days   Lab Units 10/11/22  0210 10/10/22  0112 10/09/22  0231  10/08/22  0055 10/07/22  1138 10/06/22  0753   SODIUM mmol/L 134* 136 135*   < > 141 141   POTASSIUM mmol/L 3.9 4.2 4.0   < > 4.3 4.4   CHLORIDE mmol/L 99 102 100   < > 103 105   CO2 mmol/L 23.9 24.5 25.2   < > 23.3 23.2   BUN mg/dL 15 19 20   < > 23 32*   CREATININE mg/dL 1.03 1.10 1.24   < > 1.15 1.38*   CALCIUM mg/dL 8.7 8.5* 8.7   < > 9.4 9.2   GLUCOSE mg/dL 118* 125* 120*   < > 134* 166*   ALBUMIN g/dL  --   --   --   --  3.61 3.80   BILIRUBIN mg/dL  --   --   --   --  0.9 0.9   ALK PHOS U/L  --   --   --   --  70 78   AST (SGOT) U/L  --   --   --   --  61* 100*   ALT (SGPT) U/L  --   --   --   --  28 33    < > = values in this interval not displayed.   Estimated Creatinine Clearance: 71.6 mL/min (by C-G formula based on SCr of 1.03 mg/dL).  No results found for: AMMONIA    No results found for: HGBA1C, POCGLU  No results found for: HGBA1C  No results found for: TSH, FREET4    Blood Culture   Date Value Ref Range Status   10/09/2022 Abnormal Stain (C)  Preliminary   10/09/2022 Abnormal Stain (C)  Preliminary   10/07/2022 Klebsiella pneumoniae ESBL (C)  Final     Comment:       Consider infectious disease consult.  Susceptibility results may not correlate to clinical outcomes.  For ESBL-producing infections in the blood, a carbapenem is recommended as first-line therapy for optimal clinical outcomes.   10/07/2022 Klebsiella pneumoniae ESBL (C)  Final     Comment:       Consider infectious disease consult.  Susceptibility results may not correlate to clinical outcomes.  For ESBL-producing infections in the blood, a carbapenem is recommended as first-line therapy for optimal clinical outcomes.     Urine Culture   Date Value Ref Range Status   10/07/2022 >100,000 CFU/mL Klebsiella pneumoniae ESBL (A)  Final     Comment:       Consider infectious disease consult.  Susceptibility results may not correlate to clinical outcomes.     No results found for: WOUNDCX  No results found for: STOOLCX  No results found for:  RESPX  Pain Management Panel    There is no flowsheet data to display.           ----------------------------------------------------------------------------------------------------------------------  Imaging Results (Last 24 Hours)     Procedure Component Value Units Date/Time    FL Guided Aspiration Joint [[id number]] Collected: 10/10/22 1510     Updated: 10/10/22 1536    Narrative:      EXAMINATION: FL GUIDED ASPIRATION JOINT-      CLINICAL INDICATION: septic arthritis; M00.9-Pyogenic arthritis,  unspecified        COMPARISON: MRI performed 10/07/2022.     Consent:  Patient demonstrated decisional capacity.  The procedure and possible complications were discussed with the  patient. He gave consent to continue.     Fluoroscopy was used to localize a site for entry. 23 seconds of  fluoroscopy was used. Site for entry was prepped and draped in usual  sterile fashion. Topical anesthesia was provided with lidocaine.  A 22-gauge spinal needle was then inserted into the right hip joint.  Approximately 1-2 mL of thick reddish fluid was aspirated.       Impression:      Technically successful diagnostic aspiration of the right  hip joint.      This report was finalized on 10/10/2022 3:34 PM by Dr. Chidi Mackay MD.             --------------------------------------------------------------------------------------    Pertinent Infectious Disease Results     Urinalysis on 10/7/2022 was positive with 31-50 WBCs and 4+ bacteria.  Urine culture finalized as greater than 100,000 colonies of ESBL E. coli.  Chest x-ray on 10/7/2022 showed cardiomegaly.  Coarsened interstitial markings noted throughout the lungs.  X-ray of the right hip on 10/6/2022 showed very advanced right hip osteoarthritic change and possibly chronic AVN with deformity of the femoral head contour.  CT pelvis on 10/6/2022 showed there is a large anterior abdominal wall ventral hernia containing nondilated large and small bowel.  Again there is marked deformity  of the right humeral head likely related to chronic AVN or advanced osteoarthritis.  No fracture line is evident.  MRI of the right hip on 10/7/2022 showed again noted are fairly advanced changes of osteoarthritic change and joint space collapse of the right hip.  Subchondral cystic change of both the femoral head and acetabulum are noted.  While no characteristic findings of AVN are noted this may represent sequelae of chronic AVN and subcortical collapse.  Small right hip joint effusion that could be reactive secondary to advanced osteoarthritic change.  In setting of fever and hip pain chronic septic arthritis could also be considered but the morphologic alteration of the joint do not appear to be acute.  COVID-19 and flu A/B PCR on 10/7/2022 was negative.  Blood cultures on 10/7/2022 finalized as ESBL Klebsiella pneumoniae.       Assessment/Plan         ASSESSMENT:    Sepsis present on admission  ESBL Klebsiella pneumoniae bacteremia  ESBL Klebsiella pneumoniae complicated UTI      PLAN:    I have seen and examined the patient myself this morning with Elisa Verduzco PA-C with whom I discussed plan of care and got report from primary RN. Here are my findings:    Patient seems to be very comfortable with no acute distress but continues with significant right-sided hip pain and denies any diarrhea nausea vomiting and denies any shortness of breath or chest pain.    Patient is status post right hip joint aspiration yesterday with right hip synovial fluid showing 333,780 nucleated cells with culture so far still in progress and blood cultures from 10/9/2022 so far showing gram-negative bacilli currently on ertapenem and I ordered a dose of gentamicin x1 yesterday.    I discussed the case with Dr. Hampton and highly recommended for orthopedic team to come back and perform a joint washout sooner than later due to the significant high WBC count and the arthrocentesis fluid.  CRP has worsened up to 18.8 and WBC  improved down to 10.83.      Code Status:   Code Status and Medical Interventions:   Ordered at: 10/07/22 1416     Code Status (Patient has no pulse and is not breathing):    CPR (Attempt to Resuscitate)     Medical Interventions (Patient has pulse or is breathing):    Full Support       Elisa Verduzco PA-C  10/11/22  10:38 EDT    Electronically signed by Elisa Verduzco PA-C, 10/11/22, 10:48 AM EDT.      Electronically signed by Olena Ramsay MD, 10/11/22, 11:39 AM EDT.

## 2022-10-12 LAB
ANION GAP SERPL CALCULATED.3IONS-SCNC: 10.9 MMOL/L (ref 5–15)
BACTERIA SPEC AEROBE CULT: ABNORMAL
BACTERIA SPEC AEROBE CULT: ABNORMAL
BUN SERPL-MCNC: 21 MG/DL (ref 8–23)
BUN/CREAT SERPL: 16.3 (ref 7–25)
CALCIUM SPEC-SCNC: 9.1 MG/DL (ref 8.6–10.5)
CHLORIDE SERPL-SCNC: 98 MMOL/L (ref 98–107)
CO2 SERPL-SCNC: 25.1 MMOL/L (ref 22–29)
CREAT SERPL-MCNC: 1.29 MG/DL (ref 0.76–1.27)
DEPRECATED RDW RBC AUTO: 47.8 FL (ref 37–54)
EGFRCR SERPLBLD CKD-EPI 2021: 58.9 ML/MIN/1.73
ERYTHROCYTE [DISTWIDTH] IN BLOOD BY AUTOMATED COUNT: 14.7 % (ref 12.3–15.4)
GLUCOSE SERPL-MCNC: 118 MG/DL (ref 65–99)
GRAM STN SPEC: ABNORMAL
GRAM STN SPEC: ABNORMAL
HCT VFR BLD AUTO: 37.4 % (ref 37.5–51)
HGB BLD-MCNC: 12.6 G/DL (ref 13–17.7)
ISOLATED FROM: ABNORMAL
ISOLATED FROM: ABNORMAL
MCH RBC QN AUTO: 29.8 PG (ref 26.6–33)
MCHC RBC AUTO-ENTMCNC: 33.7 G/DL (ref 31.5–35.7)
MCV RBC AUTO: 88.4 FL (ref 79–97)
PLATELET # BLD AUTO: 294 10*3/MM3 (ref 140–450)
PMV BLD AUTO: 9.9 FL (ref 6–12)
POTASSIUM SERPL-SCNC: 4 MMOL/L (ref 3.5–5.2)
RBC # BLD AUTO: 4.23 10*6/MM3 (ref 4.14–5.8)
SODIUM SERPL-SCNC: 134 MMOL/L (ref 136–145)
WBC NRBC COR # BLD: 11.82 10*3/MM3 (ref 3.4–10.8)

## 2022-10-12 PROCEDURE — 25010000002 ERTAPENEM PER 500 MG: Performed by: INTERNAL MEDICINE

## 2022-10-12 PROCEDURE — 80048 BASIC METABOLIC PNL TOTAL CA: CPT

## 2022-10-12 PROCEDURE — 99231 SBSQ HOSP IP/OBS SF/LOW 25: CPT

## 2022-10-12 PROCEDURE — 85027 COMPLETE CBC AUTOMATED: CPT

## 2022-10-12 PROCEDURE — 99232 SBSQ HOSP IP/OBS MODERATE 35: CPT | Performed by: INTERNAL MEDICINE

## 2022-10-12 PROCEDURE — 25010000002 HEPARIN (PORCINE) PER 1000 UNITS: Performed by: INTERNAL MEDICINE

## 2022-10-12 PROCEDURE — 87040 BLOOD CULTURE FOR BACTERIA: CPT | Performed by: INTERNAL MEDICINE

## 2022-10-12 RX ORDER — SODIUM CHLORIDE 9 MG/ML
125 INJECTION, SOLUTION INTRAVENOUS CONTINUOUS
Status: DISCONTINUED | OUTPATIENT
Start: 2022-10-12 | End: 2022-10-15

## 2022-10-12 RX ADMIN — METOPROLOL TARTRATE 25 MG: 25 TABLET, FILM COATED ORAL at 21:33

## 2022-10-12 RX ADMIN — Medication 5000 UNITS: at 08:22

## 2022-10-12 RX ADMIN — METOPROLOL TARTRATE 25 MG: 25 TABLET, FILM COATED ORAL at 08:23

## 2022-10-12 RX ADMIN — Medication 10 ML: at 08:28

## 2022-10-12 RX ADMIN — CYCLOBENZAPRINE 5 MG: 10 TABLET, FILM COATED ORAL at 05:01

## 2022-10-12 RX ADMIN — ACETAMINOPHEN 650 MG: 325 TABLET, FILM COATED ORAL at 17:52

## 2022-10-12 RX ADMIN — TAMSULOSIN HYDROCHLORIDE 0.4 MG: 0.4 CAPSULE ORAL at 08:22

## 2022-10-12 RX ADMIN — HEPARIN SODIUM 5000 UNITS: 5000 INJECTION INTRAVENOUS; SUBCUTANEOUS at 21:33

## 2022-10-12 RX ADMIN — OXYCODONE HYDROCHLORIDE AND ACETAMINOPHEN 1 TABLET: 5; 325 TABLET ORAL at 13:59

## 2022-10-12 RX ADMIN — HEPARIN SODIUM 5000 UNITS: 5000 INJECTION INTRAVENOUS; SUBCUTANEOUS at 05:01

## 2022-10-12 RX ADMIN — HEPARIN SODIUM 5000 UNITS: 5000 INJECTION INTRAVENOUS; SUBCUTANEOUS at 13:55

## 2022-10-12 RX ADMIN — SODIUM CHLORIDE 75 ML/HR: 9 INJECTION, SOLUTION INTRAVENOUS at 13:55

## 2022-10-12 RX ADMIN — OXYCODONE HYDROCHLORIDE AND ACETAMINOPHEN 1 TABLET: 5; 325 TABLET ORAL at 02:09

## 2022-10-12 RX ADMIN — OXYCODONE HYDROCHLORIDE AND ACETAMINOPHEN 1 TABLET: 5; 325 TABLET ORAL at 21:33

## 2022-10-12 RX ADMIN — Medication 10 ML: at 21:33

## 2022-10-12 RX ADMIN — AMLODIPINE BESYLATE 10 MG: 10 TABLET ORAL at 08:23

## 2022-10-12 RX ADMIN — PRENATAL VIT W/ FE FUMARATE-FA TAB 27-0.8 MG 1 TABLET: 27-0.8 TAB at 08:22

## 2022-10-12 RX ADMIN — ERTAPENEM SODIUM 1 G: 1 INJECTION, POWDER, LYOPHILIZED, FOR SOLUTION INTRAMUSCULAR; INTRAVENOUS at 10:39

## 2022-10-12 NOTE — PROGRESS NOTES
PROGRESS NOTE         Patient Identification:  Name:  Solitario Mcdaniel  Age:  71 y.o.  Sex:  male  :  1950  MRN:  [id number]  Visit Number:  [id number]  Primary Care Provider:  Corrine Mcdaniel APRN         LOS: 5 days       ----------------------------------------------------------------------------------------------------------------------  Subjective       Chief Complaints:    Hip Pain        Interval History:      The patient is sitting up in bed on room air in no apparent distress.  Complained of severe right lower extremity cramps overnight and received muscle relaxers.  Right hip remains very tender.  Complains of altered taste.  Seems to be tolerating Invanz well without any adverse effect thus far.  Low-grade fever overnight of 99.2 °F.  No diarrhea.  WBC is fairly stable at 11.82.  Right hip synovial fluid culture on 10/10/2022 is so far showing moderate growth of gram-negative bacilli.  Blood cultures on 10/9/2022 finalized as Klebsiella pneumoniae.    Review of Systems:    Constitutional: no fever, chills and night sweats. No appetite change or unexpected weight change.  Fatigue and generalized weakness.  Eyes: no eye drainage, itching or redness.  HEENT: no mouth sores, dysphagia or nose bleed. Dysgeusia.   Respiratory: no for shortness of breath, cough or production of sputum.  Cardiovascular: no chest pain, no palpitations, no orthopnea.   Gastrointestinal: no nausea, vomiting or diarrhea. No abdominal pain, hematemesis or rectal bleeding.  Genitourinary: no dysuria or polyuria.  Hematologic/lymphatic: no lymph node abnormalities, no easy bruising or easy bleeding.  Musculoskeletal: Arthralgias.  Gait problems.  Right hip pain.  Muscle cramps.  Skin: No rash and no itching.  Neurological: no loss of consciousness, no seizure, no headache.  Chronic right upper extremity weakness.  Behavioral/Psych: no depression or suicidal ideation.  Endocrine: no hot flashes.  Immunologic:  negative.      ----------------------------------------------------------------------------------------------------------------------      Objective       hospitals Meds:  amLODIPine, 10 mg, Oral, Daily  ertapenem, 1 g, Intravenous, Q24H  heparin (porcine), 5,000 Units, Subcutaneous, Q8H  metoprolol tartrate, 25 mg, Oral, BID  polyethylene glycol, 17 g, Oral, Daily  prenatal vitamin 27-0.8, 1 tablet, Oral, Daily  sodium chloride, 10 mL, Intravenous, Q12H  tamsulosin, 0.4 mg, Oral, Daily  vitamin D3, 5,000 Units, Oral, Daily         ----------------------------------------------------------------------------------------------------------------------    Vital Signs:  Temp:  [98 °F (36.7 °C)-99.9 °F (37.7 °C)] 98.3 °F (36.8 °C)  Heart Rate:  [56-81] 68  Resp:  [16-18] 16  BP: (126-172)/(62-79) 150/73  Mean Arterial Pressure (Non-Invasive) for the past 24 hrs (Last 3 readings):   Noninvasive MAP (mmHg)   10/12/22 0300 100   10/11/22 2300 84   10/11/22 2015 110     SpO2 Percentage    10/11/22 2300 10/12/22 0300 10/12/22 0600   SpO2: 90% 91% 90%     SpO2:  [90 %-95 %] 90 %  on   ;   Device (Oxygen Therapy): room air    Body mass index is 42.94 kg/m².  Wt Readings from Last 3 Encounters:   10/07/22 110 kg (242 lb 6.4 oz)   10/06/22 109 kg (240 lb)   06/17/21 118 kg (260 lb)        Intake/Output Summary (Last 24 hours) at 10/12/2022 0859  Last data filed at 10/12/2022 0501  Gross per 24 hour   Intake 216.52 ml   Output 1900 ml   Net -1683.48 ml     Diet Regular  ----------------------------------------------------------------------------------------------------------------------      Physical Exam:    Constitutional:  male is sitting up in bed on room air in no apparent distress.  Eating breakfast and appears comfortable.  HENT:  Head: Normocephalic and atraumatic.  Mouth:  Moist mucous membranes.  Poor dentition.  Eyes:  Conjunctivae and EOM are normal.  No scleral icterus.  Neck:  Neck supple.  No JVD  present.    Cardiovascular:  Normal rate, regular rhythm and normal heart sounds with no murmur. No edema.  Pulmonary/Chest:  No respiratory distress, no wheezes, no crackles, with normal breath sounds and good air movement.  Abdominal:  Soft.  Bowel sounds are normal.  No distension and no tenderness. Large hernia of the left sided abdomen.  Chronic indwelling Martin catheter with clear yellow urine in collection bag.  Musculoskeletal:  No tenderness and no deformity.  No swelling or redness of joints.  1+ edema of bilateral lower extremities with venous stasis changes.  Right hip without erythema, edema, warmth, or wound.  Continued pain with any palpation or movement of the right hip.  Neurological:  Alert and oriented to person, place, and time.  No facial droop.  No slurred speech.   Right upper extremity chronic weakness.  Skin:  Skin is warm and dry.  No rash noted.  No pallor.  Venous stasis changes of bilateral lower extremities.  Psychiatric:  Normal mood and affect.  Behavior is normal.       ----------------------------------------------------------------------------------------------------------------------            Results from last 7 days   Lab Units 10/12/22  0056 10/11/22  0210 10/10/22  1103 10/10/22  0111 10/09/22  0231 10/08/22  0055 10/07/22  1138 10/07/22  1045 10/06/22  0753   CRP mg/dL  --  18.80*  --   --  17.62*  --  21.28*  --   --    LACTATE mmol/L  --   --   --   --   --   --   --  0.9  --    WBC 10*3/mm3 11.82* 10.83*  --  11.32* 12.59*   < >  --  15.83* 13.23*   HEMOGLOBIN g/dL 12.6* 12.4*  --  11.7* 12.1*   < >  --  13.2 13.1   HEMATOCRIT % 37.4* 37.6  --  35.6* 35.8*   < >  --  38.5 38.8   MCV fL 88.4 89.3  --  88.8 88.2   < >  --  87.1 88.2   MCHC g/dL 33.7 33.0  --  32.9 33.8   < >  --  34.3 33.8   PLATELETS 10*3/mm3 294 239  --  205 193   < >  --  203 179   INR   --   --  1.04  --   --   --   --   --  1.02    < > = values in this interval not displayed.     Results from last 7  days   Lab Units 10/12/22  0056 10/11/22  0210 10/10/22  0112 10/08/22  0055 10/07/22  1138 10/06/22  0753   SODIUM mmol/L 134* 134* 136   < > 141 141   POTASSIUM mmol/L 4.0 3.9 4.2   < > 4.3 4.4   CHLORIDE mmol/L 98 99 102   < > 103 105   CO2 mmol/L 25.1 23.9 24.5   < > 23.3 23.2   BUN mg/dL 21 15 19   < > 23 32*   CREATININE mg/dL 1.29* 1.03 1.10   < > 1.15 1.38*   CALCIUM mg/dL 9.1 8.7 8.5*   < > 9.4 9.2   GLUCOSE mg/dL 118* 118* 125*   < > 134* 166*   ALBUMIN g/dL  --   --   --   --  3.61 3.80   BILIRUBIN mg/dL  --   --   --   --  0.9 0.9   ALK PHOS U/L  --   --   --   --  70 78   AST (SGOT) U/L  --   --   --   --  61* 100*   ALT (SGPT) U/L  --   --   --   --  28 33    < > = values in this interval not displayed.   Estimated Creatinine Clearance: 57.2 mL/min (A) (by C-G formula based on SCr of 1.29 mg/dL (H)).  No results found for: AMMONIA    No results found for: HGBA1C, POCGLU  No results found for: HGBA1C  No results found for: TSH, FREET4    Blood Culture   Date Value Ref Range Status   10/09/2022 Abnormal Stain (C)  Preliminary   10/09/2022 Abnormal Stain (C)  Preliminary   10/07/2022 Klebsiella pneumoniae ESBL (C)  Final     Comment:       Consider infectious disease consult.  Susceptibility results may not correlate to clinical outcomes.  For ESBL-producing infections in the blood, a carbapenem is recommended as first-line therapy for optimal clinical outcomes.   10/07/2022 Klebsiella pneumoniae ESBL (C)  Final     Comment:       Consider infectious disease consult.  Susceptibility results may not correlate to clinical outcomes.  For ESBL-producing infections in the blood, a carbapenem is recommended as first-line therapy for optimal clinical outcomes.     Urine Culture   Date Value Ref Range Status   10/07/2022 >100,000 CFU/mL Klebsiella pneumoniae ESBL (A)  Final     Comment:       Consider infectious disease consult.  Susceptibility results may not correlate to clinical outcomes.     No results found  for: WOUNDCX  No results found for: STOOLCX  No results found for: RESPCX  Pain Management Panel    There is no flowsheet data to display.           ----------------------------------------------------------------------------------------------------------------------  Imaging Results (Last 24 Hours)     ** No results found for the last 24 hours. **          --------------------------------------------------------------------------------------    Pertinent Infectious Disease Results     Urinalysis on 10/7/2022 was positive with 31-50 WBCs and 4+ bacteria.  Urine culture finalized as greater than 100,000 colonies of ESBL E. coli.  Chest x-ray on 10/7/2022 showed cardiomegaly.  Coarsened interstitial markings noted throughout the lungs.  X-ray of the right hip on 10/6/2022 showed very advanced right hip osteoarthritic change and possibly chronic AVN with deformity of the femoral head contour.  CT pelvis on 10/6/2022 showed there is a large anterior abdominal wall ventral hernia containing nondilated large and small bowel.  Again there is marked deformity of the right humeral head likely related to chronic AVN or advanced osteoarthritis.  No fracture line is evident.  MRI of the right hip on 10/7/2022 showed again noted are fairly advanced changes of osteoarthritic change and joint space collapse of the right hip.  Subchondral cystic change of both the femoral head and acetabulum are noted.  While no characteristic findings of AVN are noted this may represent sequelae of chronic AVN and subcortical collapse.  Small right hip joint effusion that could be reactive secondary to advanced osteoarthritic change.  In setting of fever and hip pain chronic septic arthritis could also be considered but the morphologic alteration of the joint do not appear to be acute.  COVID-19 and flu A/B PCR on 10/7/2022 was negative.  Blood cultures on 10/7/2022 finalized as ESBL Klebsiella pneumoniae.       Assessment/Plan          ASSESSMENT:    Sepsis present on admission  ESBL Klebsiella pneumoniae bacteremia  ESBL Klebsiella pneumoniae complicated UTI      PLAN:    I have seen and examined the patient myself this morning and discussed the plan of care with Elisa Verduzco PA-C and got report from primary RN. Here are my findings:    Patient is sitting up in bed on room air with no apparent distress.  Continues to have significant right hip pain and overnight has developed right lower extremity cramps initiated on muscle relaxer.    Right hip pain remains very tender to touch on exam and tolerating ertapenem course without any adverse events.    Patient had a low-grade fever overnight at 99.2 °F with no diarrhea.  WBC is stable at 11.82 and right hip synovial fluid culture so far showing growth of gram-negative bacilli.  Blood cultures from 10/9/2022 is finalized as Klebsiella pneumoniae.    We continue to recommend right hip joint washout especially if bacteremia persist and would recommend to repeat blood cultures to document clearing of bacteremia.    Code Status:   Code Status and Medical Interventions:   Ordered at: 10/07/22 1416     Code Status (Patient has no pulse and is not breathing):    CPR (Attempt to Resuscitate)     Medical Interventions (Patient has pulse or is breathing):    Full Support       Elisa Verduzco PA-C  10/12/22  08:59 EDT    Electronically signed by Elisa Verduzco PA-C, 10/12/22, 9:05 AM EDT.    Electronically signed by Olena Ramsay MD, 10/12/22, 1:00 PM EDT.

## 2022-10-12 NOTE — PROGRESS NOTES
Culture of joint fluid right hip is growing gram-negative bacilli, discussed with Dr. Alberto by phone and would like to come back this afternoon to discuss with patient possibility of  washout of joint space with placement of drain, patient will also need a PICC line for 6 weeks antibiotic.  Instructed patient's nurse Ms. Rhett RN to start the patient on IV fluids and n.p.o. past midnight.  Patient noted to have slight worsening of renal function/CODY.  Plan as above.

## 2022-10-12 NOTE — PLAN OF CARE
Goal Outcome Evaluation:              Outcome Evaluation: Patient has rested in bed this shift, complained of pain PRN medications given, no other complaints or request at this time, VSS, Will continue to monitor.

## 2022-10-12 NOTE — CASE MANAGEMENT/SOCIAL WORK
Discharge Planning [facility]     Patient Name: Solitario Mcdaniel  MRN: [id number]  Today's Date: 10/12/2022    Admit Date: 10/7/2022    Plan: Pt currently lives with daughter and GAEB. Pt's daughter requests SNF placement if pt is unable to ambulate. Pt has been in [facility] in the past and she is agreeable to [facility] and [facility] facilities. Pt has walker and W/C via unknown provider. Pt sleeps in a lift chair at home. Pt has [facility] for routine lab draws. PCP is Corrine WOODSON-she does telehealth visits with him. He gets rx filled at [facility]. He has Medicare parat A only and Medicaid pending. He does not have POA/living will. Pt's will need ambulance for transport home at d/c if he is unable to get in and out of the car. SS will follow.         Discharge Plan     Row Name 10/12/22 1415       Plan    Plan Pt currently lives with daughter and GABE. Pt's daughter requests SNF placement if pt is unable to ambulate. Pt has been in [facility] in the past and she is agreeable to [facility], and [facility] facilities. Pt has walker and W/C via unknown provider. Pt sleeps in a lift chair at home. Pt has [facility] for routine lab draws. PCP is Corrine WOODSON-she does telehealth visits with him. He gets rx filled at [facility]. He has Medicare parat A only and Medicaid pending. He does not have POA/living will. Pt's will need ambulance for transport home at d/c if he is unable to get in and out of the car. SS will follow.                LOPEZ Luu

## 2022-10-12 NOTE — PLAN OF CARE
Goal Outcome Evaluation:  Plan of Care Reviewed With: patient        Progress: no change  Outcome Evaluation: Pt had c/o pain, PRN pain medications given per MAR. Pt had 3 large BM today. No acute changes at this time. VSS. Will continue plan of care.

## 2022-10-12 NOTE — PROGRESS NOTES
Patient Identification:  Name:  Solitario Mcdaniel  Age:  71 y.o.  Sex:  male  :  1950  MRN:  [id number]  Visit Number:  [id number]  Primary Care Provider:  Corrine Mcdaniel APRN    Length of stay:  5    Subjective/Interval History/Consultants/Procedures     Chief Complaint:   Chief Complaint   Patient presents with   • Hip Pain       Subjective/Interval History:    71 y.o. male who was admitted on 10/7/2022 with sepsis secondary to acute cystitis in setting of chronic indwelling Martin catheter.  Mr. Mcdaniel was resting in bed in no acute distress when I saw him today.  He states that he is still having some pain in his right hip that is worse with movement or touch.  He states that he has been unable to sit up in a chair but cannot stand.  He denies abdominal pain, nausea, vomiting.    PMH is significant for arthritis, hypertension, BPH.  For complete admission information, please see history and physical.     Consultations:  Orthopedic surgery  Infectious disease      Procedures/Scans:  CT pelvis without contrast on 10/6  MRI right hip without contrast 10/7  Right hip joint aspiration 10/10      ----------------------------------------------------------------------------------------------------------------------  [facility] [place] Meds:  amLODIPine, 10 mg, Oral, Daily  ertapenem, 1 g, Intravenous, Q24H  heparin (porcine), 5,000 Units, Subcutaneous, Q8H  metoprolol tartrate, 25 mg, Oral, BID  polyethylene glycol, 17 g, Oral, Daily  prenatal vitamin 27-0.8, 1 tablet, Oral, Daily  sodium chloride, 10 mL, Intravenous, Q12H  tamsulosin, 0.4 mg, Oral, Daily  vitamin D3, 5,000 Units, Oral, Daily         ----------------------------------------------------------------------------------------------------------------------      Objective     Vital Signs:  Temp:  [98 °F (36.7 °C)-99.9 °F (37.7 °C)] 98.5 °F (36.9 °C)  Heart Rate:  [56-74] 67  Resp:  [16-18] 16  BP: (126-172)/(60-73) 139/60      10/07/22  1006  10/07/22  1622   Weight: 109 kg (240 lb 4.8 oz) 110 kg (242 lb 6.4 oz)     Body mass index is 42.94 kg/m².    Intake/Output Summary (Last 24 hours) at 10/12/2022 1211  Last data filed at 10/12/2022 1000  Gross per 24 hour   Intake 0 ml   Output 2200 ml   Net -2200 ml     I/O this shift:  In: -   Out: 300 [Urine:300]  Diet Regular  ----------------------------------------------------------------------------------------------------------------------    Physical Exam  Vitals and nursing note reviewed.   Constitutional:       General: He is not in acute distress.     Appearance: He is ill-appearing.   HENT:      Head: Normocephalic and atraumatic.   Eyes:      Conjunctiva/sclera: Conjunctivae normal.      Pupils: Pupils are equal, round, and reactive to light.   Cardiovascular:      Rate and Rhythm: Normal rate and regular rhythm.      Pulses: Normal pulses.   Pulmonary:      Effort: Pulmonary effort is normal.   Abdominal:      Palpations: Abdomen is soft.   Musculoskeletal:      Cervical back: Normal range of motion.      Right lower leg: Edema present.      Left lower leg: Edema present.      Comments: Venous stasis dermatitis bilateral lower extremities   Skin:     General: Skin is warm and dry.   Neurological:      Mental Status: He is alert and oriented to person, place, and time.                ----------------------------------------------------------------------------------------------------------------------  ----------------------------------------------------------------------------------------------------------------------      Results from last 7 days   Lab Units 10/12/22  0056 10/11/22  0210 10/10/22  1103 10/10/22  0111 10/09/22  0231 10/08/22  0055 10/07/22  1138 10/07/22  1045 10/06/22  0753   CRP mg/dL  --  18.80*  --   --  17.62*  --  21.28*  --   --    LACTATE mmol/L  --   --   --   --   --   --   --  0.9  --    WBC 10*3/mm3 11.82* 10.83*  --  11.32* 12.59*   < >  --  15.83* 13.23*   HEMOGLOBIN g/dL  12.6* 12.4*  --  11.7* 12.1*   < >  --  13.2 13.1   HEMATOCRIT % 37.4* 37.6  --  35.6* 35.8*   < >  --  38.5 38.8   MCV fL 88.4 89.3  --  88.8 88.2   < >  --  87.1 88.2   MCHC g/dL 33.7 33.0  --  32.9 33.8   < >  --  34.3 33.8   PLATELETS 10*3/mm3 294 239  --  205 193   < >  --  203 179   INR   --   --  1.04  --   --   --   --   --  1.02    < > = values in this interval not displayed.         Results from last 7 days   Lab Units 10/12/22  0056 10/11/22  0210 10/10/22  0112 10/08/22  0055 10/07/22  1138 10/06/22  0753   SODIUM mmol/L 134* 134* 136   < > 141 141   POTASSIUM mmol/L 4.0 3.9 4.2   < > 4.3 4.4   CHLORIDE mmol/L 98 99 102   < > 103 105   CO2 mmol/L 25.1 23.9 24.5   < > 23.3 23.2   BUN mg/dL 21 15 19   < > 23 32*   CREATININE mg/dL 1.29* 1.03 1.10   < > 1.15 1.38*   CALCIUM mg/dL 9.1 8.7 8.5*   < > 9.4 9.2   GLUCOSE mg/dL 118* 118* 125*   < > 134* 166*   ALBUMIN g/dL  --   --   --   --  3.61 3.80   BILIRUBIN mg/dL  --   --   --   --  0.9 0.9   ALK PHOS U/L  --   --   --   --  70 78   AST (SGOT) U/L  --   --   --   --  61* 100*   ALT (SGPT) U/L  --   --   --   --  28 33    < > = values in this interval not displayed.   Estimated Creatinine Clearance: 57.2 mL/min (A) (by C-G formula based on SCr of 1.29 mg/dL (H)).  No results found for: AMMONIA      Blood Culture   Date Value Ref Range Status   10/09/2022 Klebsiella pneumoniae ssp pneumoniae (C)  Final   10/09/2022 Klebsiella pneumoniae ssp pneumoniae (C)  Final   10/07/2022 Klebsiella pneumoniae ESBL (C)  Final     Comment:       Consider infectious disease consult.  Susceptibility results may not correlate to clinical outcomes.  For ESBL-producing infections in the blood, a carbapenem is recommended as first-line therapy for optimal clinical outcomes.   10/07/2022 Klebsiella pneumoniae ESBL (C)  Final     Comment:       Consider infectious disease consult.  Susceptibility results may not correlate to clinical outcomes.  For ESBL-producing infections in the  blood, a carbapenem is recommended as first-line therapy for optimal clinical outcomes.     Urine Culture   Date Value Ref Range Status   10/07/2022 >100,000 CFU/mL Klebsiella pneumoniae ESBL (A)  Final     Comment:       Consider infectious disease consult.  Susceptibility results may not correlate to clinical outcomes.     No results found for: WOUNDCX  No results found for: STOOLCX  ----------------------------------------------------------------------------------------------------------------------  Imaging Results (Last 24 Hours)     ** No results found for the last 24 hours. **        ----------------------------------------------------------------------------------------------------------------------   I have reviewed the above laboratory values for 10/12/22    Assessment/Plan     Active Hospital Problems    Diagnosis  POA   • Septic hip (HCC) [M00.9]  Yes         ASSESSMENT/PLAN:    Sepsis likely secondary to UTI  Klebsiella and ESBL bacteremia  Right hip pain, possible septic arthritis  Infectious disease is following and recommends joint washout  Joint fluid culture preliminary result shows 3+ gram-negative bacilli  Continue Invanz  Pain control as needed Percocet  Flexeril as needed for muscle spasm  PT OT consult  Afebrile at time of exam today  Repeat CBC in the a.m.    Hypertension  BP adequately controlled at this time  Continue metoprolol and Norvasc  Holding parameters are in place to prevent hypotension/bradycardia  Continue to monitor    BPH/urinary retention  Indwelling Martin in place  Catheter care per protocol  Continue Flomax        -----------  -DVT prophylaxis: Subcu heparin  -Disposition plans/anticipated needs: Pending clinical course will likely require SNF placement.        The patient is high risk due to the following diagnoses/reasons: Sepsis on admission, bacteremia, acute cystitis with chronic indwelling Martin catheter, concern for septic right hip.        Sahil Menon,  KRISHNA  10/12/22  12:11 EDT

## 2022-10-13 ENCOUNTER — ANESTHESIA EVENT (OUTPATIENT)
Dept: PERIOP | Facility: HOSPITAL | Age: 72
End: 2022-10-13

## 2022-10-13 ENCOUNTER — ANESTHESIA (OUTPATIENT)
Dept: PERIOP | Facility: HOSPITAL | Age: 72
End: 2022-10-13

## 2022-10-13 LAB
ANION GAP SERPL CALCULATED.3IONS-SCNC: 13.9 MMOL/L (ref 5–15)
BACTERIA FLD CULT: ABNORMAL
BUN SERPL-MCNC: 25 MG/DL (ref 8–23)
BUN/CREAT SERPL: 18 (ref 7–25)
CALCIUM SPEC-SCNC: 9.1 MG/DL (ref 8.6–10.5)
CHLORIDE SERPL-SCNC: 98 MMOL/L (ref 98–107)
CO2 SERPL-SCNC: 20.1 MMOL/L (ref 22–29)
CREAT SERPL-MCNC: 1.39 MG/DL (ref 0.76–1.27)
CRP SERPL-MCNC: 19.13 MG/DL (ref 0–0.5)
D-LACTATE SERPL-SCNC: 0.8 MMOL/L (ref 0.5–2)
DEPRECATED RDW RBC AUTO: 49.5 FL (ref 37–54)
EGFRCR SERPLBLD CKD-EPI 2021: 53.9 ML/MIN/1.73
ERYTHROCYTE [DISTWIDTH] IN BLOOD BY AUTOMATED COUNT: 15 % (ref 12.3–15.4)
GLUCOSE SERPL-MCNC: 116 MG/DL (ref 65–99)
GRAM STN SPEC: ABNORMAL
GRAM STN SPEC: ABNORMAL
HCT VFR BLD AUTO: 41.8 % (ref 37.5–51)
HGB BLD-MCNC: 13.9 G/DL (ref 13–17.7)
MCH RBC QN AUTO: 30 PG (ref 26.6–33)
MCHC RBC AUTO-ENTMCNC: 33.3 G/DL (ref 31.5–35.7)
MCV RBC AUTO: 90.3 FL (ref 79–97)
PLATELET # BLD AUTO: 313 10*3/MM3 (ref 140–450)
PMV BLD AUTO: 9.6 FL (ref 6–12)
POTASSIUM SERPL-SCNC: 5.1 MMOL/L (ref 3.5–5.2)
RBC # BLD AUTO: 4.63 10*6/MM3 (ref 4.14–5.8)
SODIUM SERPL-SCNC: 132 MMOL/L (ref 136–145)
WBC NRBC COR # BLD: 23.92 10*3/MM3 (ref 3.4–10.8)

## 2022-10-13 PROCEDURE — 80048 BASIC METABOLIC PNL TOTAL CA: CPT

## 2022-10-13 PROCEDURE — 25010000002 ERTAPENEM PER 500 MG: Performed by: NURSE PRACTITIONER

## 2022-10-13 PROCEDURE — 83605 ASSAY OF LACTIC ACID: CPT

## 2022-10-13 PROCEDURE — 86140 C-REACTIVE PROTEIN: CPT

## 2022-10-13 PROCEDURE — 87449 NOS EACH ORGANISM AG IA: CPT

## 2022-10-13 PROCEDURE — 99232 SBSQ HOSP IP/OBS MODERATE 35: CPT | Performed by: INTERNAL MEDICINE

## 2022-10-13 PROCEDURE — 85027 COMPLETE CBC AUTOMATED: CPT

## 2022-10-13 PROCEDURE — 99232 SBSQ HOSP IP/OBS MODERATE 35: CPT

## 2022-10-13 PROCEDURE — 87493 C DIFF AMPLIFIED PROBE: CPT

## 2022-10-13 PROCEDURE — 25010000002 HEPARIN (PORCINE) PER 1000 UNITS: Performed by: INTERNAL MEDICINE

## 2022-10-13 RX ORDER — VANCOMYCIN HYDROCHLORIDE 125 MG/1
125 CAPSULE ORAL EVERY 6 HOURS SCHEDULED
Status: DISCONTINUED | OUTPATIENT
Start: 2022-10-13 | End: 2022-10-20 | Stop reason: HOSPADM

## 2022-10-13 RX ADMIN — ACETAMINOPHEN 650 MG: 325 TABLET, FILM COATED ORAL at 17:25

## 2022-10-13 RX ADMIN — SODIUM CHLORIDE 75 ML/HR: 9 INJECTION, SOLUTION INTRAVENOUS at 03:20

## 2022-10-13 RX ADMIN — HEPARIN SODIUM 5000 UNITS: 5000 INJECTION INTRAVENOUS; SUBCUTANEOUS at 21:15

## 2022-10-13 RX ADMIN — OXYCODONE HYDROCHLORIDE AND ACETAMINOPHEN 1 TABLET: 5; 325 TABLET ORAL at 21:27

## 2022-10-13 RX ADMIN — TAMSULOSIN HYDROCHLORIDE 0.4 MG: 0.4 CAPSULE ORAL at 09:04

## 2022-10-13 RX ADMIN — Medication 10 ML: at 09:07

## 2022-10-13 RX ADMIN — Medication 10 ML: at 21:27

## 2022-10-13 RX ADMIN — METOPROLOL TARTRATE 25 MG: 25 TABLET, FILM COATED ORAL at 21:15

## 2022-10-13 RX ADMIN — AMLODIPINE BESYLATE 10 MG: 10 TABLET ORAL at 09:04

## 2022-10-13 RX ADMIN — PRENATAL VIT W/ FE FUMARATE-FA TAB 27-0.8 MG 1 TABLET: 27-0.8 TAB at 09:04

## 2022-10-13 RX ADMIN — VANCOMYCIN HYDROCHLORIDE 125 MG: 125 CAPSULE ORAL at 17:24

## 2022-10-13 RX ADMIN — Medication 5000 UNITS: at 09:04

## 2022-10-13 RX ADMIN — METOPROLOL TARTRATE 25 MG: 25 TABLET, FILM COATED ORAL at 09:04

## 2022-10-13 RX ADMIN — ERTAPENEM SODIUM 1 G: 1 INJECTION, POWDER, LYOPHILIZED, FOR SOLUTION INTRAMUSCULAR; INTRAVENOUS at 10:27

## 2022-10-13 NOTE — SIGNIFICANT NOTE
"   10/13/22 1514   OTHER   Discipline physical therapist   Rehab Time/Intention   Session Not Performed patient/family declined treatment  (Pt. states \"I am in too much pain to do anything right now\".)     "

## 2022-10-13 NOTE — CASE MANAGEMENT/SOCIAL WORK
Discharge Planning Assessment   [facility]     Patient Name: Solitario Mcdaniel  MRN: [id number]  Today's Date: 10/13/2022    Admit Date: 10/7/2022     Discharge Plan     Row Name 10/13/22 1639       Plan    Plan Pt has a [facility] consult. SS discused pt with [facility] per Amanda Steven who states being unable to accept pt due to pt only having Medicare Part A. SS to follow.              Continued Care and Services - Admitted Since 10/7/2022     Destination     Service Provider Request Status Selected Services Address Phone Fax Patient Preferred    [facility] [place] Declined  Medicare Part A only N/A [address] [facility] [place] [address] [phone] --              Expected Discharge Date and Time     Expected Discharge Date Expected Discharge Time    Oct 14, 2022       TYSON Benitez

## 2022-10-13 NOTE — PROGRESS NOTES
PROGRESS NOTE         Patient Identification:  Name:  Solitario Mcdaniel  Age:  71 y.o.  Sex:  male  :  1950  MRN:  [id number]  Visit Number:  [id number]  Primary Care Provider:  Corrine Mcdaniel APRN         LOS: 6 days       ----------------------------------------------------------------------------------------------------------------------  Subjective       Chief Complaints:    Hip Pain        Interval History:      Patient resting in bed this morning.  Continued right hip pain he reports is a 7 out of 10.  Tenderness to touch or with movement.  Currently on room air with no apparent distress.  Low-grade fever of 100.3 this morning.  WBC worsening at 23.92.  Patient n.p.o. for washout of right hip today.  Right hip culture from 10/10/2022 preliminary reports gram-negative bacilli.     Review of Systems:    Constitutional: no fever, chills and night sweats. No appetite change or unexpected weight change.  Fatigue and generalized weakness.  Eyes: no eye drainage, itching or redness.  HEENT: no mouth sores, dysphagia or nose bleed.  Respiratory: no for shortness of breath, cough or production of sputum.  Cardiovascular: no chest pain, no palpitations, no orthopnea.   Gastrointestinal: no nausea, vomiting or diarrhea. No abdominal pain, hematemesis or rectal bleeding.  Genitourinary: no dysuria or polyuria.  Hematologic/lymphatic: no lymph node abnormalities, no easy bruising or easy bleeding.  Musculoskeletal:  Right hip pain.  Skin: No rash and no itching.  Neurological: no loss of consciousness, no seizure, no headache.  Chronic right upper extremity weakness.  Behavioral/Psych: no depression or suicidal ideation.  Endocrine: no hot flashes.  Immunologic: negative.      ----------------------------------------------------------------------------------------------------------------------      Objective       Current [facility] Meds:  amLODIPine, 10 mg, Oral, Daily  ertapenem, 1 g, Intravenous,  Q24H  heparin (porcine), 5,000 Units, Subcutaneous, Q8H  metoprolol tartrate, 25 mg, Oral, BID  polyethylene glycol, 17 g, Oral, Daily  prenatal vitamin 27-0.8, 1 tablet, Oral, Daily  sodium chloride, 10 mL, Intravenous, Q12H  tamsulosin, 0.4 mg, Oral, Daily  vitamin D3, 5,000 Units, Oral, Daily      sodium chloride, 75 mL/hr, Last Rate: 75 mL/hr (10/13/22 0320)      ----------------------------------------------------------------------------------------------------------------------    Vital Signs:  Temp:  [97.9 °F (36.6 °C)-100.3 °F (37.9 °C)] 100.3 °F (37.9 °C)  Heart Rate:  [] 87  Resp:  [16-18] 16  BP: (124-155)/(57-68) 141/68  No data found.  SpO2 Percentage    10/12/22 1000 10/12/22 1400 10/12/22 1856   SpO2: 92% 94% 94%     SpO2:  [92 %-94 %] 94 %  on   ;   Device (Oxygen Therapy): room air    Body mass index is 42.94 kg/m².  Wt Readings from Last 3 Encounters:   10/07/22 110 kg (242 lb 6.4 oz)   10/06/22 109 kg (240 lb)   06/17/21 118 kg (260 lb)        Intake/Output Summary (Last 24 hours) at 10/13/2022 0970  Last data filed at 10/13/2022 0300  Gross per 24 hour   Intake 60 ml   Output 1125 ml   Net -1065 ml     NPO Diet NPO Type: Strict NPO  ----------------------------------------------------------------------------------------------------------------------      Physical Exam:    Constitutional:  male is sitting up in bed on room air in no apparent distress.   HENT:  Head: Normocephalic and atraumatic.  Mouth:  Moist mucous membranes.  Poor dentition.  Eyes:  Conjunctivae and EOM are normal.  No scleral icterus.  Neck:  Neck supple.  No JVD present.    Cardiovascular:  Normal rate, regular rhythm and normal heart sounds with no murmur. No edema.  Pulmonary/Chest:  No respiratory distress, no wheezes, no crackles, with normal breath sounds and good air movement.  Abdominal:  Soft.  Bowel sounds are normal.  No distension and no tenderness. Large hernia of the left sided abdomen.  Chronic  indwelling Martin catheter with clear yellow urine in collection bag.  Musculoskeletal:  No tenderness and no deformity.  No swelling or redness of joints.  1+ edema of bilateral lower extremities with venous stasis changes.  Right hip without erythema, edema, warmth. Continued pain touch or movement.  Neurological:  Alert and oriented to person, place, and time.  No facial droop.  No slurred speech.   Right upper extremity chronic weakness.  Skin:  Skin is warm and dry.  No rash noted.  No pallor.  Venous stasis changes of bilateral lower extremities.  Psychiatric:  Normal mood and affect.  Behavior is normal.       ----------------------------------------------------------------------------------------------------------------------            Results from last 7 days   Lab Units 10/13/22  0118 10/12/22  0056 10/11/22  0210 10/10/22  1103 10/10/22  0111 10/09/22  0231 10/08/22  0055 10/07/22  1138 10/07/22  1045   CRP mg/dL  --   --  18.80*  --   --  17.62*  --  21.28*  --    LACTATE mmol/L  --   --   --   --   --   --   --   --  0.9   WBC 10*3/mm3 23.92* 11.82* 10.83*  --    < > 12.59*   < >  --  15.83*   HEMOGLOBIN g/dL 13.9 12.6* 12.4*  --    < > 12.1*   < >  --  13.2   HEMATOCRIT % 41.8 37.4* 37.6  --    < > 35.8*   < >  --  38.5   MCV fL 90.3 88.4 89.3  --    < > 88.2   < >  --  87.1   MCHC g/dL 33.3 33.7 33.0  --    < > 33.8   < >  --  34.3   PLATELETS 10*3/mm3 313 294 239  --    < > 193   < >  --  203   INR   --   --   --  1.04  --   --   --   --   --     < > = values in this interval not displayed.     Results from last 7 days   Lab Units 10/13/22  0118 10/12/22  0056 10/11/22  0210 10/08/22  0055 10/07/22  1138   SODIUM mmol/L 132* 134* 134*   < > 141   POTASSIUM mmol/L 5.1 4.0 3.9   < > 4.3   CHLORIDE mmol/L 98 98 99   < > 103   CO2 mmol/L 20.1* 25.1 23.9   < > 23.3   BUN mg/dL 25* 21 15   < > 23   CREATININE mg/dL 1.39* 1.29* 1.03   < > 1.15   CALCIUM mg/dL 9.1 9.1 8.7   < > 9.4   GLUCOSE mg/dL 116* 118*  118*   < > 134*   ALBUMIN g/dL  --   --   --   --  3.61   BILIRUBIN mg/dL  --   --   --   --  0.9   ALK PHOS U/L  --   --   --   --  70   AST (SGOT) U/L  --   --   --   --  61*   ALT (SGPT) U/L  --   --   --   --  28    < > = values in this interval not displayed.   Estimated Creatinine Clearance: 53.1 mL/min (A) (by C-G formula based on SCr of 1.39 mg/dL (H)).  No results found for: AMMONIA    No results found for: HGBA1C, POCGLU  No results found for: HGBA1C  No results found for: TSH, FREET4    Blood Culture   Date Value Ref Range Status   10/09/2022 Abnormal Stain (C)  Preliminary   10/09/2022 Abnormal Stain (C)  Preliminary   10/07/2022 Klebsiella pneumoniae ESBL (C)  Final     Comment:       Consider infectious disease consult.  Susceptibility results may not correlate to clinical outcomes.  For ESBL-producing infections in the blood, a carbapenem is recommended as first-line therapy for optimal clinical outcomes.   10/07/2022 Klebsiella pneumoniae ESBL (C)  Final     Comment:       Consider infectious disease consult.  Susceptibility results may not correlate to clinical outcomes.  For ESBL-producing infections in the blood, a carbapenem is recommended as first-line therapy for optimal clinical outcomes.     Urine Culture   Date Value Ref Range Status   10/07/2022 >100,000 CFU/mL Klebsiella pneumoniae ESBL (A)  Final     Comment:       Consider infectious disease consult.  Susceptibility results may not correlate to clinical outcomes.     No results found for: WOUNDCX  No results found for: STOOLCX  No results found for: RESPCX  Pain Management Panel    There is no flowsheet data to display.           ----------------------------------------------------------------------------------------------------------------------  Imaging Results (Last 24 Hours)     ** No results found for the last 24 hours. **          --------------------------------------------------------------------------------------    Pertinent  Infectious Disease Results     Urinalysis on 10/7/2022 was positive with 31-50 WBCs and 4+ bacteria.  Urine culture finalized as greater than 100,000 colonies of ESBL E. coli.  Chest x-ray on 10/7/2022 showed cardiomegaly.  Coarsened interstitial markings noted throughout the lungs.  X-ray of the right hip on 10/6/2022 showed very advanced right hip osteoarthritic change and possibly chronic AVN with deformity of the femoral head contour.  CT pelvis on 10/6/2022 showed there is a large anterior abdominal wall ventral hernia containing nondilated large and small bowel.  Again there is marked deformity of the right humeral head likely related to chronic AVN or advanced osteoarthritis.  No fracture line is evident.  MRI of the right hip on 10/7/2022 showed again noted are fairly advanced changes of osteoarthritic change and joint space collapse of the right hip.  Subchondral cystic change of both the femoral head and acetabulum are noted.  While no characteristic findings of AVN are noted this may represent sequelae of chronic AVN and subcortical collapse.  Small right hip joint effusion that could be reactive secondary to advanced osteoarthritic change.  In setting of fever and hip pain chronic septic arthritis could also be considered but the morphologic alteration of the joint do not appear to be acute.  COVID-19 and flu A/B PCR on 10/7/2022 was negative.  Blood cultures on 10/7/2022 finalized as ESBL Klebsiella pneumoniae.           Assessment/Plan         ASSESSMENT:    Sepsis present on admission  ESBL Klebsiella pneumoniae bacteremia  ESBL Klebsiella pneumoniae complicated UTI      PLAN:    I have seen and examined the patient this morning myself and discussed the plan of care with KANDICE Ordoñez and here are my findings:    Patient is resting in bed without any acute distress but continues to have significant right hip pain that he reports at 7 out of 10 with tenderness to even light touch or  movement.    Currently on room air with no distress with low-grade fever overnight at 100.3 and worsening leukocytosis at 23.92.    As previously recommended patient needs a washout of the right hip especially in the setting of persistent severe pain and worsening leukocytosis and is currently n.p.o. for a hip washout hopefully today and would recommend to take intraoperative cultures.    Previous cultures from the right hip so far showing growth of gram-negative bacilli as patient continues on ertapenem monotherapy.  I repeated blood cultures to follow-up clearing of bacteremia.    Code Status:   Code Status and Medical Interventions:   Ordered at: 10/07/22 1416     Code Status (Patient has no pulse and is not breathing):    CPR (Attempt to Resuscitate)     Medical Interventions (Patient has pulse or is breathing):    Full Support       KANDICE Ordoñez  10/13/22  09:27 EDT    Electronically signed by KANDICE Ordoñez, 10/13/22, 9:34 AM EDT.      Electronically signed by Olena Ramsay MD, 10/13/22, 1:17 PM EDT.

## 2022-10-13 NOTE — NURSING NOTE
Pt has had several loose and watery stools, stools are very odorous. Notified EUSEBIA Stout and received no new orders called surgery and notified them of loose stools

## 2022-10-13 NOTE — PLAN OF CARE
Goal Outcome Evaluation:           Progress: no change  Outcome Evaluation: pt has rested in bed this shift, has had multple loose and watery stools that are odorous,  notified PA and surgery of loose stools as patient as not been took to surgery yet. No new orders from PA, no acute changes in patient, will continue plan of care

## 2022-10-13 NOTE — PROGRESS NOTES
Patient Identification:  Name:  Solitario Mcdaniel  Age:  71 y.o.  Sex:  male  :  1950  MRN:  [id number]  Visit Number:  [id number]  Primary Care Provider:  Corrine Mcdaniel APRN    Length of stay:  6    Subjective/Interval History/Consultants/Procedures     Chief Complaint:   Chief Complaint   Patient presents with   • Hip Pain       Subjective/Interval History:    71 y.o. male who was admitted on 10/7/2022 with sepsis secondary to acute cystitis in setting of indwelling Martin catheter    PMH is significant for arthritis, hypertension, BPH.  For complete admission information, please see history and physical.     Consultations:  Orthopedic surgery  Infectious disease      Procedures/Scans:  CT pelvis without contrast on 10/6  MRI right hip without contrast 10/7  Right hip joint aspiration 10/10    Today, the patient was resting comfortably in bed in no acute distress.  He does not complain of as much pain today as yesterday.  He states it is still very uncomfortable when they have to change his bedding or move him.  He states that he has been having bowel movements including 1 this morning.  He denies any feeling of fever or chills.  He states that he is feeling some better this morning that he has been.  He states that he has been drinking some.  Denies chest pain, shortness of breath, abdominal pain, nausea, vomiting.  He says that they have discussed this procedure today and states he thinks they said they would take him down around 1:00.  He states he has not had anything to eat.        ----------------------------------------------------------------------------------------------------------------------  Current [facility] Meds:  amLODIPine, 10 mg, Oral, Daily  ertapenem, 1 g, Intravenous, Q24H  heparin (porcine), 5,000 Units, Subcutaneous, Q8H  metoprolol tartrate, 25 mg, Oral, BID  polyethylene glycol, 17 g, Oral, Daily  prenatal vitamin 27-0.8, 1 tablet, Oral, Daily  sodium chloride, 10 mL,  Intravenous, Q12H  tamsulosin, 0.4 mg, Oral, Daily  vitamin D3, 5,000 Units, Oral, Daily      sodium chloride, 75 mL/hr, Last Rate: 75 mL/hr (10/13/22 0320)      ----------------------------------------------------------------------------------------------------------------------      Objective     Vital Signs:  Temp:  [97.9 °F (36.6 °C)-100.3 °F (37.9 °C)] 100.3 °F (37.9 °C)  Heart Rate:  [] 87  Resp:  [16-18] 16  BP: (124-155)/(57-68) 141/68      10/07/22  1008 10/07/22  1622   Weight: 109 kg (240 lb 4.8 oz) 110 kg (242 lb 6.4 oz)     Body mass index is 42.94 kg/m².    Intake/Output Summary (Last 24 hours) at 10/13/2022 0841  Last data filed at 10/13/2022 0300  Gross per 24 hour   Intake 60 ml   Output 1125 ml   Net -1065 ml     No intake/output data recorded.  NPO Diet NPO Type: Strict NPO  ----------------------------------------------------------------------------------------------------------------------    Physical Exam  Constitutional:       Appearance: He is ill-appearing.   HENT:      Head: Normocephalic and atraumatic.   Eyes:      Conjunctiva/sclera: Conjunctivae normal.      Pupils: Pupils are equal, round, and reactive to light.   Cardiovascular:      Rate and Rhythm: Normal rate and regular rhythm.      Pulses: Normal pulses.   Pulmonary:      Effort: Pulmonary effort is normal.      Breath sounds: Normal breath sounds.   Abdominal:      General: Bowel sounds are normal.      Palpations: Abdomen is soft.   Musculoskeletal:         General: Normal range of motion.      Cervical back: Normal range of motion.      Right lower leg: Edema present.      Left lower leg: Edema present.   Skin:     General: Skin is warm and dry.   Neurological:      Mental Status: He is alert.      Comments: Some disorientation as to date and time   Psychiatric:         Mood and Affect: Mood normal.                 ----------------------------------------------------------------------------------------------------------------------  ----------------------------------------------------------------------------------------------------------------------      Results from last 7 days   Lab Units 10/13/22  0118 10/12/22  0056 10/11/22  0210 10/10/22  1103 10/10/22  0111 10/09/22  0231 10/08/22  0055 10/07/22  1138 10/07/22  1045   CRP mg/dL  --   --  18.80*  --   --  17.62*  --  21.28*  --    LACTATE mmol/L  --   --   --   --   --   --   --   --  0.9   WBC 10*3/mm3 23.92* 11.82* 10.83*  --    < > 12.59*   < >  --  15.83*   HEMOGLOBIN g/dL 13.9 12.6* 12.4*  --    < > 12.1*   < >  --  13.2   HEMATOCRIT % 41.8 37.4* 37.6  --    < > 35.8*   < >  --  38.5   MCV fL 90.3 88.4 89.3  --    < > 88.2   < >  --  87.1   MCHC g/dL 33.3 33.7 33.0  --    < > 33.8   < >  --  34.3   PLATELETS 10*3/mm3 313 294 239  --    < > 193   < >  --  203   INR   --   --   --  1.04  --   --   --   --   --     < > = values in this interval not displayed.         Results from last 7 days   Lab Units 10/13/22  0118 10/12/22  0056 10/11/22  0210 10/08/22  0055 10/07/22  1138   SODIUM mmol/L 132* 134* 134*   < > 141   POTASSIUM mmol/L 5.1 4.0 3.9   < > 4.3   CHLORIDE mmol/L 98 98 99   < > 103   CO2 mmol/L 20.1* 25.1 23.9   < > 23.3   BUN mg/dL 25* 21 15   < > 23   CREATININE mg/dL 1.39* 1.29* 1.03   < > 1.15   CALCIUM mg/dL 9.1 9.1 8.7   < > 9.4   GLUCOSE mg/dL 116* 118* 118*   < > 134*   ALBUMIN g/dL  --   --   --   --  3.61   BILIRUBIN mg/dL  --   --   --   --  0.9   ALK PHOS U/L  --   --   --   --  70   AST (SGOT) U/L  --   --   --   --  61*   ALT (SGPT) U/L  --   --   --   --  28    < > = values in this interval not displayed.   Estimated Creatinine Clearance: 53.1 mL/min (A) (by C-G formula based on SCr of 1.39 mg/dL (H)).  No results found for: AMMONIA      Blood Culture   Date Value Ref Range Status   10/09/2022 Klebsiella pneumoniae ssp pneumoniae (C)  Final    10/09/2022 Klebsiella pneumoniae ssp pneumoniae (C)  Final   10/07/2022 Klebsiella pneumoniae ESBL (C)  Final     Comment:       Consider infectious disease consult.  Susceptibility results may not correlate to clinical outcomes.  For ESBL-producing infections in the blood, a carbapenem is recommended as first-line therapy for optimal clinical outcomes.   10/07/2022 Klebsiella pneumoniae ESBL (C)  Final     Comment:       Consider infectious disease consult.  Susceptibility results may not correlate to clinical outcomes.  For ESBL-producing infections in the blood, a carbapenem is recommended as first-line therapy for optimal clinical outcomes.     Urine Culture   Date Value Ref Range Status   10/07/2022 >100,000 CFU/mL Klebsiella pneumoniae ESBL (A)  Final     Comment:       Consider infectious disease consult.  Susceptibility results may not correlate to clinical outcomes.     No results found for: WOUNDCX  No results found for: STOOLCX  ----------------------------------------------------------------------------------------------------------------------  Imaging Results (Last 24 Hours)     ** No results found for the last 24 hours. **        ----------------------------------------------------------------------------------------------------------------------   I have reviewed the above laboratory values for 10/13/22    Assessment/Plan     Active Hospital Problems    Diagnosis  POA   • Septic hip (HCC) [M00.9]  Yes         ASSESSMENT/PLAN:    Sepsis likely secondary to UTI  Klebsiella and ESBL bacteremia  Right hip pain, possible septic arthritis  Infectious disease is following and recommends joint washout  Joint fluid culture preliminary result shows 3+ gram-negative bacilli  Continue Invanz  Pain control as needed Percocet  Flexeril as needed for muscle spasm  PT OT consult  Temp 100.3 today at 0500  WBC count increased to 29.92 this a.m.  Discussed with Dr. Hampton, planning for washout and PICC line  placement for 6 weeks of antibiotic delivery  At this time planning long-term acute care transfer  Repeat CBC in the a.m.      Acute kidney injury   Potential etiologies sepsis VS.  Meds vS.  Fluid status  Creatinine has trended upward for the past 48 hours  Cr this AM-1.39  Dark-colored urine in collection bag at time of exam this morning  Encourage patient to drink fluids (after procedure)  Patient is currently on 75 mL/h  Monitor inputs outputs  Repeat labs    Hypertension  BP adequately controlled at this time  Continue metoprolol and Norvasc  Holding parameters are in place to prevent hypotension/bradycardia  Continue to monitor     BPH/urinary retention  Indwelling Martin in place  Catheter care per protocol  Continue Flomax      -----------  -DVT prophylaxis: Subcu heparin  -Disposition plans/anticipated needs: Pending clinical course.  Possible transfer to long-term acute care        The patient is high risk due to the following diagnoses/reasons: Sepsis/bacteremia, acute cystitis with chronic indwelling Martin catheter, concern for septic right hip.        Sahil Menon PA-C  10/13/22  08:41 EDT

## 2022-10-13 NOTE — ANESTHESIA PREPROCEDURE EVALUATION
Anesthesia Evaluation     Patient summary reviewed and Nursing notes reviewed   no history of anesthetic complications:               Airway   Mallampati: I  TM distance: >3 FB  Neck ROM: full  No difficulty expected  Dental    (+) poor dentition        Pulmonary - negative pulmonary ROS and normal exam   Cardiovascular - normal exam  Exercise tolerance: good (4-7 METS)    NYHA Classification: II  Rhythm: regular  Rate: normal    (+) hypertension,       Neuro/Psych  (+) weakness (right arm),    GI/Hepatic/Renal/Endo    (+) morbid obesity,      Musculoskeletal     Abdominal  - normal exam    Bowel sounds: normal.   Substance History - negative use     OB/GYN negative ob/gyn ROS         Other   arthritis,      ROS/Med Hx Other: Has weakness right arm from covid one year ago.                Anesthesia Plan    ASA 4     general     intravenous induction     Anesthetic plan, risks, benefits, and alternatives have been provided, discussed and informed consent has been obtained with: patient.    Use of blood products discussed with consented to blood products.       CODE STATUS:    Code Status (Patient has no pulse and is not breathing): CPR (Attempt to Resuscitate)  Medical Interventions (Patient has pulse or is breathing): Full Support

## 2022-10-13 NOTE — PLAN OF CARE
Goal Outcome Evaluation:              Outcome Evaluation: Patient has rested in bed this shift, PRN medications given for complaints or pain, NPO at midnight, no other complaints or request at this time, VSS, Will continue to monitor.

## 2022-10-14 LAB
027 TOXIN: ABNORMAL
ANION GAP SERPL CALCULATED.3IONS-SCNC: 10.8 MMOL/L (ref 5–15)
BUN SERPL-MCNC: 30 MG/DL (ref 8–23)
BUN/CREAT SERPL: 23.1 (ref 7–25)
C DIFF GDH + TOXINS A+B STL QL IA.RAPID: NEGATIVE
C DIFF TOX GENS STL QL NAA+PROBE: POSITIVE
CALCIUM SPEC-SCNC: 8.9 MG/DL (ref 8.6–10.5)
CHLORIDE SERPL-SCNC: 103 MMOL/L (ref 98–107)
CO2 SERPL-SCNC: 23.2 MMOL/L (ref 22–29)
CREAT SERPL-MCNC: 1.3 MG/DL (ref 0.76–1.27)
DEPRECATED RDW RBC AUTO: 50.5 FL (ref 37–54)
EGFRCR SERPLBLD CKD-EPI 2021: 58.4 ML/MIN/1.73
ERYTHROCYTE [DISTWIDTH] IN BLOOD BY AUTOMATED COUNT: 15.3 % (ref 12.3–15.4)
GLUCOSE SERPL-MCNC: 100 MG/DL (ref 65–99)
HCT VFR BLD AUTO: 38.3 % (ref 37.5–51)
HGB BLD-MCNC: 12.4 G/DL (ref 13–17.7)
MCH RBC QN AUTO: 29.2 PG (ref 26.6–33)
MCHC RBC AUTO-ENTMCNC: 32.4 G/DL (ref 31.5–35.7)
MCV RBC AUTO: 90.3 FL (ref 79–97)
PLATELET # BLD AUTO: 319 10*3/MM3 (ref 140–450)
PMV BLD AUTO: 9.7 FL (ref 6–12)
POTASSIUM SERPL-SCNC: 4.1 MMOL/L (ref 3.5–5.2)
RBC # BLD AUTO: 4.24 10*6/MM3 (ref 4.14–5.8)
SODIUM SERPL-SCNC: 137 MMOL/L (ref 136–145)
WBC NRBC COR # BLD: 17.52 10*3/MM3 (ref 3.4–10.8)

## 2022-10-14 PROCEDURE — 25010000002 ERTAPENEM PER 500 MG: Performed by: ORTHOPAEDIC SURGERY

## 2022-10-14 PROCEDURE — 87102 FUNGUS ISOLATION CULTURE: CPT | Performed by: ORTHOPAEDIC SURGERY

## 2022-10-14 PROCEDURE — 25010000002 HEPARIN (PORCINE) PER 1000 UNITS: Performed by: INTERNAL MEDICINE

## 2022-10-14 PROCEDURE — 25010000002 CEFAZOLIN PER 500 MG: Performed by: ORTHOPAEDIC SURGERY

## 2022-10-14 PROCEDURE — 80048 BASIC METABOLIC PNL TOTAL CA: CPT

## 2022-10-14 PROCEDURE — 87075 CULTR BACTERIA EXCEPT BLOOD: CPT | Performed by: ORTHOPAEDIC SURGERY

## 2022-10-14 PROCEDURE — 25010000002 HEPARIN (PORCINE) PER 1000 UNITS: Performed by: ORTHOPAEDIC SURGERY

## 2022-10-14 PROCEDURE — C1713 ANCHOR/SCREW BN/BN,TIS/BN: HCPCS | Performed by: ORTHOPAEDIC SURGERY

## 2022-10-14 PROCEDURE — 0S990ZZ DRAINAGE OF RIGHT HIP JOINT, OPEN APPROACH: ICD-10-PCS | Performed by: ORTHOPAEDIC SURGERY

## 2022-10-14 PROCEDURE — 99233 SBSQ HOSP IP/OBS HIGH 50: CPT | Performed by: INTERNAL MEDICINE

## 2022-10-14 PROCEDURE — 25010000002 ONDANSETRON PER 1 MG: Performed by: NURSE ANESTHETIST, CERTIFIED REGISTERED

## 2022-10-14 PROCEDURE — 87205 SMEAR GRAM STAIN: CPT | Performed by: ORTHOPAEDIC SURGERY

## 2022-10-14 PROCEDURE — C1751 CATH, INF, PER/CENT/MIDLINE: HCPCS

## 2022-10-14 PROCEDURE — 99231 SBSQ HOSP IP/OBS SF/LOW 25: CPT

## 2022-10-14 PROCEDURE — 25010000002 PROPOFOL 200 MG/20ML EMULSION: Performed by: NURSE ANESTHETIST, CERTIFIED REGISTERED

## 2022-10-14 PROCEDURE — 3E0U029 INTRODUCTION OF OTHER ANTI-INFECTIVE INTO JOINTS, OPEN APPROACH: ICD-10-PCS | Performed by: ORTHOPAEDIC SURGERY

## 2022-10-14 PROCEDURE — 25010000002 VANCOMYCIN 1 G RECONSTITUTED SOLUTION 1 EACH VIAL: Performed by: ORTHOPAEDIC SURGERY

## 2022-10-14 PROCEDURE — 87070 CULTURE OTHR SPECIMN AEROBIC: CPT | Performed by: ORTHOPAEDIC SURGERY

## 2022-10-14 PROCEDURE — 25010000002 VANCOMYCIN 1 G RECONSTITUTED SOLUTION: Performed by: ORTHOPAEDIC SURGERY

## 2022-10-14 PROCEDURE — 02HV33Z INSERTION OF INFUSION DEVICE INTO SUPERIOR VENA CAVA, PERCUTANEOUS APPROACH: ICD-10-PCS | Performed by: ORTHOPAEDIC SURGERY

## 2022-10-14 PROCEDURE — 85027 COMPLETE CBC AUTOMATED: CPT

## 2022-10-14 PROCEDURE — 25010000002 FENTANYL CITRATE (PF) 50 MCG/ML SOLUTION: Performed by: NURSE ANESTHETIST, CERTIFIED REGISTERED

## 2022-10-14 DEVICE — STIMULAN® RAPID CURE PROVIDED STERILE FOR SINGLE PATIENT USE. STIMULAN® RAPID CURE CONTAINS CALCIUM SULFATE POWDER AND MIXING SOLUTION IN PRE-MEASURED QUANTITIES SO THAT WHEN MIXED TOGETHER IN A STERILE MIXING BOWL, THE RESULTANT PASTE IS TO BE DIGITALLY PACKED INTO OPEN BONE VOID/GAP TO SET INSITU OR PLACED INTO THE MOULD PROVIDED, THE MIXTURE SETS TO FORM BEADS. THE BIODEGRADABLE, RADIOPAQUE BEADS ARE RESORBED IN APPROXIMATELY 30 – 60 DAYS WHEN USED IN ACCORDANCE WITH THE DEVICE LABELLING. STIMULAN® RAPID CURE IS MANUFACTURED FROM SYNTHETIC IMPLANT GRADE CALCIUM SULFATE DIHYDRATE(CASO4.2H2O) THAT RESORBS AND IS REPLACED WITH BONE DURING THE HEALING PROCESS. ALSO, AS THE BONE VOID FILLER BEADS ARE BIODEGRADABLE AND BIOCOMPATIBLE, THEY MAY BE USED AT AN INFECTED SITE.
Type: IMPLANTABLE DEVICE | Site: HIP | Status: FUNCTIONAL
Brand: STIMULAN® RAPID CURE

## 2022-10-14 RX ORDER — SODIUM CHLORIDE 0.9 % (FLUSH) 0.9 %
10 SYRINGE (ML) INJECTION EVERY 12 HOURS SCHEDULED
Status: DISCONTINUED | OUTPATIENT
Start: 2022-10-14 | End: 2022-10-20 | Stop reason: HOSPADM

## 2022-10-14 RX ORDER — PROPOFOL 10 MG/ML
INJECTION, EMULSION INTRAVENOUS AS NEEDED
Status: DISCONTINUED | OUTPATIENT
Start: 2022-10-14 | End: 2022-10-14 | Stop reason: SURG

## 2022-10-14 RX ORDER — ONDANSETRON 2 MG/ML
4 INJECTION INTRAMUSCULAR; INTRAVENOUS AS NEEDED
Status: DISCONTINUED | OUTPATIENT
Start: 2022-10-14 | End: 2022-10-14 | Stop reason: HOSPADM

## 2022-10-14 RX ORDER — FENTANYL CITRATE 50 UG/ML
INJECTION, SOLUTION INTRAMUSCULAR; INTRAVENOUS AS NEEDED
Status: DISCONTINUED | OUTPATIENT
Start: 2022-10-14 | End: 2022-10-14 | Stop reason: SURG

## 2022-10-14 RX ORDER — HYDROCODONE BITARTRATE AND ACETAMINOPHEN 7.5; 325 MG/1; MG/1
1 TABLET ORAL EVERY 4 HOURS PRN
Status: DISCONTINUED | OUTPATIENT
Start: 2022-10-14 | End: 2022-10-20 | Stop reason: HOSPADM

## 2022-10-14 RX ORDER — OXYCODONE HYDROCHLORIDE AND ACETAMINOPHEN 5; 325 MG/1; MG/1
1 TABLET ORAL ONCE AS NEEDED
Status: DISCONTINUED | OUTPATIENT
Start: 2022-10-14 | End: 2022-10-14 | Stop reason: HOSPADM

## 2022-10-14 RX ORDER — VANCOMYCIN HYDROCHLORIDE 1 G/20ML
INJECTION, POWDER, LYOPHILIZED, FOR SOLUTION INTRAVENOUS AS NEEDED
Status: DISCONTINUED | OUTPATIENT
Start: 2022-10-14 | End: 2022-10-14 | Stop reason: HOSPADM

## 2022-10-14 RX ORDER — SODIUM CHLORIDE, SODIUM LACTATE, POTASSIUM CHLORIDE, CALCIUM CHLORIDE 600; 310; 30; 20 MG/100ML; MG/100ML; MG/100ML; MG/100ML
100 INJECTION, SOLUTION INTRAVENOUS ONCE AS NEEDED
Status: DISCONTINUED | OUTPATIENT
Start: 2022-10-14 | End: 2022-10-14 | Stop reason: HOSPADM

## 2022-10-14 RX ORDER — KETOROLAC TROMETHAMINE 30 MG/ML
15 INJECTION, SOLUTION INTRAMUSCULAR; INTRAVENOUS EVERY 6 HOURS PRN
Status: DISCONTINUED | OUTPATIENT
Start: 2022-10-14 | End: 2022-10-14 | Stop reason: HOSPADM

## 2022-10-14 RX ORDER — HYDROCODONE BITARTRATE AND ACETAMINOPHEN 10; 325 MG/1; MG/1
1 TABLET ORAL EVERY 4 HOURS PRN
Status: DISCONTINUED | OUTPATIENT
Start: 2022-10-14 | End: 2022-10-20 | Stop reason: HOSPADM

## 2022-10-14 RX ORDER — MEPERIDINE HYDROCHLORIDE 25 MG/ML
12.5 INJECTION INTRAMUSCULAR; INTRAVENOUS; SUBCUTANEOUS
Status: DISCONTINUED | OUTPATIENT
Start: 2022-10-14 | End: 2022-10-14 | Stop reason: HOSPADM

## 2022-10-14 RX ORDER — IPRATROPIUM BROMIDE AND ALBUTEROL SULFATE 2.5; .5 MG/3ML; MG/3ML
3 SOLUTION RESPIRATORY (INHALATION) ONCE AS NEEDED
Status: DISCONTINUED | OUTPATIENT
Start: 2022-10-14 | End: 2022-10-14 | Stop reason: HOSPADM

## 2022-10-14 RX ORDER — FERROUS SULFATE 325(65) MG
325 TABLET ORAL
Status: DISCONTINUED | OUTPATIENT
Start: 2022-10-14 | End: 2022-10-20 | Stop reason: HOSPADM

## 2022-10-14 RX ORDER — ONDANSETRON 2 MG/ML
INJECTION INTRAMUSCULAR; INTRAVENOUS AS NEEDED
Status: DISCONTINUED | OUTPATIENT
Start: 2022-10-14 | End: 2022-10-14 | Stop reason: SURG

## 2022-10-14 RX ORDER — SODIUM CHLORIDE 0.9 % (FLUSH) 0.9 %
20 SYRINGE (ML) INJECTION AS NEEDED
Status: DISCONTINUED | OUTPATIENT
Start: 2022-10-14 | End: 2022-10-20 | Stop reason: HOSPADM

## 2022-10-14 RX ORDER — SODIUM CHLORIDE, SODIUM LACTATE, POTASSIUM CHLORIDE, CALCIUM CHLORIDE 600; 310; 30; 20 MG/100ML; MG/100ML; MG/100ML; MG/100ML
INJECTION, SOLUTION INTRAVENOUS CONTINUOUS PRN
Status: DISCONTINUED | OUTPATIENT
Start: 2022-10-14 | End: 2022-10-14 | Stop reason: SURG

## 2022-10-14 RX ORDER — FENTANYL CITRATE 50 UG/ML
50 INJECTION, SOLUTION INTRAMUSCULAR; INTRAVENOUS
Status: DISCONTINUED | OUTPATIENT
Start: 2022-10-14 | End: 2022-10-14 | Stop reason: HOSPADM

## 2022-10-14 RX ORDER — SODIUM CHLORIDE 0.9 % (FLUSH) 0.9 %
10 SYRINGE (ML) INJECTION AS NEEDED
Status: DISCONTINUED | OUTPATIENT
Start: 2022-10-14 | End: 2022-10-20 | Stop reason: HOSPADM

## 2022-10-14 RX ADMIN — Medication 10 ML: at 11:20

## 2022-10-14 RX ADMIN — VANCOMYCIN HYDROCHLORIDE 125 MG: 125 CAPSULE ORAL at 11:13

## 2022-10-14 RX ADMIN — TAMSULOSIN HYDROCHLORIDE 0.4 MG: 0.4 CAPSULE ORAL at 11:14

## 2022-10-14 RX ADMIN — VANCOMYCIN HYDROCHLORIDE 125 MG: 125 CAPSULE ORAL at 17:12

## 2022-10-14 RX ADMIN — Medication 10 ML: at 20:16

## 2022-10-14 RX ADMIN — HEPARIN SODIUM 5000 UNITS: 5000 INJECTION INTRAVENOUS; SUBCUTANEOUS at 05:33

## 2022-10-14 RX ADMIN — Medication 5000 UNITS: at 11:13

## 2022-10-14 RX ADMIN — SODIUM CHLORIDE, POTASSIUM CHLORIDE, SODIUM LACTATE AND CALCIUM CHLORIDE: 600; 310; 30; 20 INJECTION, SOLUTION INTRAVENOUS at 07:35

## 2022-10-14 RX ADMIN — HEPARIN SODIUM 5000 UNITS: 5000 INJECTION INTRAVENOUS; SUBCUTANEOUS at 15:15

## 2022-10-14 RX ADMIN — ONDANSETRON 4 MG: 2 INJECTION INTRAMUSCULAR; INTRAVENOUS at 08:15

## 2022-10-14 RX ADMIN — PRENATAL VIT W/ FE FUMARATE-FA TAB 27-0.8 MG 1 TABLET: 27-0.8 TAB at 11:13

## 2022-10-14 RX ADMIN — METOPROLOL TARTRATE 25 MG: 25 TABLET, FILM COATED ORAL at 11:14

## 2022-10-14 RX ADMIN — ERTAPENEM SODIUM 1 G: 1 INJECTION, POWDER, LYOPHILIZED, FOR SOLUTION INTRAMUSCULAR; INTRAVENOUS at 11:51

## 2022-10-14 RX ADMIN — HYDROCODONE BITARTRATE AND ACETAMINOPHEN 1 TABLET: 7.5; 325 TABLET ORAL at 12:32

## 2022-10-14 RX ADMIN — VANCOMYCIN HYDROCHLORIDE 125 MG: 125 CAPSULE ORAL at 05:34

## 2022-10-14 RX ADMIN — HEPARIN SODIUM 5000 UNITS: 5000 INJECTION INTRAVENOUS; SUBCUTANEOUS at 22:07

## 2022-10-14 RX ADMIN — AMLODIPINE BESYLATE 10 MG: 10 TABLET ORAL at 11:14

## 2022-10-14 RX ADMIN — OXYCODONE HYDROCHLORIDE AND ACETAMINOPHEN 1 TABLET: 5; 325 TABLET ORAL at 05:40

## 2022-10-14 RX ADMIN — SODIUM CHLORIDE 125 ML/HR: 9 INJECTION, SOLUTION INTRAVENOUS at 15:52

## 2022-10-14 RX ADMIN — METOPROLOL TARTRATE 25 MG: 25 TABLET, FILM COATED ORAL at 20:05

## 2022-10-14 RX ADMIN — VANCOMYCIN HYDROCHLORIDE 125 MG: 125 CAPSULE ORAL at 00:08

## 2022-10-14 RX ADMIN — HYDROCODONE BITARTRATE AND ACETAMINOPHEN 1 TABLET: 7.5; 325 TABLET ORAL at 20:05

## 2022-10-14 RX ADMIN — FERROUS SULFATE TAB 325 MG (65 MG ELEMENTAL FE) 325 MG: 325 (65 FE) TAB at 11:49

## 2022-10-14 RX ADMIN — PROPOFOL 120 MG: 10 INJECTION, EMULSION INTRAVENOUS at 07:41

## 2022-10-14 RX ADMIN — VANCOMYCIN HYDROCHLORIDE 125 MG: 125 CAPSULE ORAL at 23:11

## 2022-10-14 RX ADMIN — FENTANYL CITRATE 100 MCG: 50 INJECTION INTRAMUSCULAR; INTRAVENOUS at 07:54

## 2022-10-14 RX ADMIN — SODIUM CHLORIDE 125 ML/HR: 9 INJECTION, SOLUTION INTRAVENOUS at 01:42

## 2022-10-14 RX ADMIN — CEFAZOLIN 2 G: 2 INJECTION, POWDER, FOR SOLUTION INTRAMUSCULAR; INTRAVENOUS at 17:12

## 2022-10-14 NOTE — CASE MANAGEMENT/SOCIAL WORK
Discharge Planning Assessment   Sanya     Patient Name: Solitario Mcdaniel  MRN: [id number]  Today's Date: 10/14/2022    Admit Date: 10/7/2022    Plan: SS contacted [employer] who states pt will get a spend down Medicaid. SS contacted pt's daughter, Maryann who states pt [facility] and had Medicare Part A and a spend down Medicaid. Pt's daughter is agreeable for SS to discuss spend down Medicaid with [facility] and is agreeable for referral to be sent to [facility] if he is unable to be admitted to [facility]. SS contacted [facility] per Amanda Steven who will have insurance verified again on Monday, 10/17/22. SS to follow.     Discharge Plan     Row Name 10/14/22 1141       Plan    Plan SS contacted [employer] who states pt will get a spend down Medicaid. SS contacted pt's daughter, Maryann who states pt was at [facility] in the past and had Medicare Part A and a spend down Medicaid. Pt's daughter is agreeable for SS to discuss spend down Medicaid with [facility] and is agreeable for referral to be sent to [facility] if he is unable to be admitted to [facility]. SS contacted [facility] per Amanda Steven who will have insurance verified again on Monday, 10/17/22. SS to follow.              Continued Care and Services - Admitted Since 10/7/2022     Destination     Service Provider Request Status Selected Services Address Phone Fax Patient Preferred    [facility] Declined  Medicare Part A only N/A [address] [facility] [place] [address] [phone] --              Expected Discharge Date and Time     Expected Discharge Date Expected Discharge Time    Oct 17, 2022         TYSON Benitez

## 2022-10-14 NOTE — OP NOTE
Solitario Mcdaniel  [id number]  1950     Date of Procedure: 10/14/22    Preoperative Diagnosis: Right hip septic arthritis    Postoperative Diagnosis: Right hip avascular necrosis with possible underlying septic arthritis     Procedure: Right hip incision, irrigation and drainage with placement of Hemovac drain and antibiotic beads    Indications for Surgery:     Surgeon(s): Chris Alberto DO     Anesthesia: LMA    EBL: 40 cc    Tourniquet: None    Preoperative Antibiotic: No additional abx were given prior to incision.     Specimens: Cultures     Complications: None    Disposition: Stable to the recovery room      Operative Procedure: The patient was taken to the operating room and placed supine on the operating room table. A timeout was performed to verify the appropriate location, patient name, and intended procedure.  All bony prominences were well-padded. Anesthesia used an LMA for airway management during this procedure. The patient was placed on the [place] table with both legs in traction.  The right hip was very stiff both with internal and external rotation.    The right hip was then prepped with ChloraPrep and draped sterilely.  The planned skin incision was marked along the anterolateral aspect of the right hip, 1 cm distal and 1 cm lateral to the ASIS.  The skin was then covered with an Ioban drape.    The skin incision was then made with a 10 blade knife.  Electrocautery was utilized to cauterize any superficial bleeding vessels and dissect down to the fascia overlying the tensor and the sartorius.  This was then incised and dissected free of the underlying muscle.  The perforating vessels were then able to be identified, and these were cauterized.    A retractor was then placed on the superior aspect of the femoral neck, and this was clearly able to be palpated.  I then utilized the Bovie to incise the capsule, and expected a large rush of fluid with the previous positive aspirate from the right hip.   However, there was minimal to no fluid from the right hip.  The capsule was released along the intertrochanteric region, to allow for it to be reflected and ensure that we were indeed intracapsular with the dissection.  Hohmann retractors were placed superiorly and inferiorly along the femoral neck, and attempted rotation was then performed.  The hip was extremely stiff, even with traction.  No fluid is noted with this rotation.    3 L of normal saline with vancomycin was then infiltrated throughout this entire region with the hip being rotated internally and externally.  I then irrigated with an additional 3 L of normal saline without any antibiotic solution through the hip joint.    Antibiotic beads were then placed which included both tobramycin and vancomycin and these were placed within the hip joint.  I then used PDS suture to approximate the joint capsule.  The Hemovac drain was then placed intracapsularly along the inferior aspect of the femoral neck, and the fascia was then approximated with PDS suture.  The drain was carefully avoided to allow this to be pulled without any repeat surgical treatment.    The skin edges then approximated with 2-0 Monocryl and the drain was then sutured to the skin to prevent any traumatic removal.  The skin was then approximated with 2-0 nylon, and a sterile dressing was then placed over the surgical site.  Adequate hemostasis was noted.    The patient is able to be extubated and transferred to the postoperative recovery in stable condition.          The patient was able to be extubated and transferred to the postoperative recovery unit in stable condition. I did discuss the operative findings with the patient's family in the preoperative waiting area.    Chris Alberto DO  10/14/22  09:27 EDT

## 2022-10-14 NOTE — ANESTHESIA PROCEDURE NOTES
Airway  Urgency: elective    Airway not difficult    General Information and Staff    Patient location during procedure: OR  CRNA/CAA: Lorenzo Devries CRNA    Indications and Patient Condition    Preoxygenated: yes  MILS not maintained throughout  Mask difficulty assessment: 0 - not attempted    Final Airway Details  Final airway type: supraglottic airway      Successful airway: unique  Size 4     Number of attempts at approach: 1  Assessment: lips, teeth, and gum same as pre-op and atraumatic intubation    Additional Comments  Atraumatic LMA placement, dentition unchanged.

## 2022-10-14 NOTE — ANESTHESIA POSTPROCEDURE EVALUATION
Patient: Solitario Mcdaniel    Procedure Summary     Date: 10/14/22 Room / Location: [facility] OR  /  [facility] OR    Anesthesia Start: 0735 Anesthesia Stop: 0934    Procedure: HIP INCISION AND DRAINAGE (Right: Hip) Diagnosis:       Septic hip (HCC)      (Septic hip (HCC) [M00.9])    Surgeons: Chris Alberto DO Provider: Davian Smith MD    Anesthesia Type: general ASA Status: 4          Anesthesia Type: general    Vitals  Vitals Value Taken Time   /58 10/14/22 1006   Temp 97.2 °F (36.2 °C) 10/14/22 1006   Pulse 69 10/14/22 1006   Resp 17 10/14/22 1006   SpO2 96 % 10/14/22 1006           Post Anesthesia Care and Evaluation    Patient location during evaluation: PHASE II  Patient participation: complete - patient participated  Level of consciousness: awake and alert  Pain score: 1  Pain management: adequate    Airway patency: patent  Anesthetic complications: No anesthetic complications  PONV Status: controlled  Cardiovascular status: acceptable  Respiratory status: acceptable  Hydration status: acceptable

## 2022-10-14 NOTE — PLAN OF CARE
Goal Outcome Evaluation:  Plan of Care Reviewed With: patient           Outcome Evaluation: Pt rested comfortably this shift. C/o pain and given pain medication per prn order. Pt has tested positive for C. Difficile this morning and is in isolation. VSS. No other changes,will continue to monitor.

## 2022-10-14 NOTE — PLAN OF CARE
Goal Outcome Evaluation:           Progress: no change  Outcome Evaluation: pt has rested throughout shift, pt had a right hip wash out and tolerated well, has had pain meds given at request, pt had a single lumen picc line placed in the left brachial and tolerated well, no acute changes in pt, will continue plan of care

## 2022-10-14 NOTE — NURSING NOTE
Assessment:  Diagnosis: sepsis; septic arthritis    No Known Allergies    Order Date/Time: 10/14/22  Indications: Long Term Antibiotics   LABS:  Lab Results   Component Value Date    INR 1.04 10/10/2022    PROTIME 13.8 10/10/2022     No results found for: PTT  Lab Results   Component Value Date    WBC 17.52 (H) 10/14/2022    HGB 12.4 (L) 10/14/2022    HCT 38.3 10/14/2022    MCV 90.3 10/14/2022     10/14/2022     Lab Results   Component Value Date    BUN 30 (H) 10/14/2022     Lab Results   Component Value Date    CREATININE 1.30 (H) 10/14/2022     Lab Results   Component Value Date    EGFRIFNONA 55 (L) 06/17/2021     Labs Reviewed: WNL    Contraindications for PICC/Midline:  No Contraindication      Recommendations:  Peripherally inserted central catheter    Procedure Time Out:  Time out Time: 1300  Correct Patient Identity: Yes  Correct Surgical Side and Site Are Marked: N/A  Agreement on Procedure to be done: Yes  Antibiotic Given: N/A  RN: Lizette Agrawal RN  RN: n/a  Other: n/a      Lizette Agrawal, RN

## 2022-10-14 NOTE — PROGRESS NOTES
PROGRESS NOTE         Patient Identification:  Name:  Solitario Mcdaniel  Age:  71 y.o.  Sex:  male  :  1950  MRN:  [id number]  Visit Number:  [id number]  Primary Care Provider:  Corrine Mcdaniel APRN         LOS: 7 days       ----------------------------------------------------------------------------------------------------------------------  Subjective       Chief Complaints:    Hip Pain        Interval History:      Patient with new onset diarrhea yesterday evening, 7 documented bowel movements on 10/13/2022.  C. difficile toxin PCR performed and was positive, antigen negative.  WBC improved at 17.52.  Patient underwent irrigation and drainage with placement of Hemovac drain and antibiotic beads today.  Intraoperative culture from the right hip currently in process.    Review of Systems:    Constitutional: no fever, chills and night sweats. No appetite change or unexpected weight change.  Fatigue and generalized weakness.  Eyes: no eye drainage, itching or redness.  HEENT: no mouth sores, dysphagia or nose bleed.  Respiratory: no for shortness of breath, cough or production of sputum.  Cardiovascular: no chest pain, no palpitations, no orthopnea.   Gastrointestinal: no nausea, vomiting. Positive diarrhea. No abdominal pain, hematemesis or rectal bleeding.  Genitourinary: no dysuria or polyuria.  Hematologic/lymphatic: no lymph node abnormalities, no easy bruising or easy bleeding.  Musculoskeletal:  Right hip pain.  Skin: No rash and no itching.  Neurological: no loss of consciousness, no seizure, no headache.  Chronic right upper extremity weakness.  Behavioral/Psych: no depression or suicidal ideation.  Endocrine: no hot flashes.  Immunologic: negative.      ----------------------------------------------------------------------------------------------------------------------      Objective       [facility] [place] Meds:  amLODIPine, 10 mg, Oral, Daily  ceFAZolin, 2 g, Intravenous,  Q8H  ertapenem, 1 g, Intravenous, Q24H  ferrous sulfate, 325 mg, Oral, Daily With Breakfast  heparin (porcine), 5,000 Units, Subcutaneous, Q8H  metoprolol tartrate, 25 mg, Oral, BID  polyethylene glycol, 17 g, Oral, Daily  prenatal vitamin 27-0.8, 1 tablet, Oral, Daily  sodium chloride, 10 mL, Intravenous, Q12H  sodium chloride, 10 mL, Intravenous, Q12H  tamsulosin, 0.4 mg, Oral, Daily  vancomycin, 125 mg, Oral, Q6H  vitamin D3, 5,000 Units, Oral, Daily      Pharmacy Consult,   sodium chloride, 125 mL/hr, Last Rate: 125 mL/hr (10/14/22 0142)      ----------------------------------------------------------------------------------------------------------------------    Vital Signs:  Temp:  [97.2 °F (36.2 °C)-98.7 °F (37.1 °C)] 97.2 °F (36.2 °C)  Heart Rate:  [68-84] 79  Resp:  [11-20] 17  BP: (115-150)/(54-83) 147/62  No data found.  SpO2 Percentage    10/14/22 0956 10/14/22 1001 10/14/22 1006   SpO2: 96% 97% 96%     SpO2:  [94 %-97 %] 96 %  on  Flow (L/min):  [3-4] 3;   Device (Oxygen Therapy): nasal cannula    Body mass index is 42.94 kg/m².  Wt Readings from Last 3 Encounters:   10/07/22 110 kg (242 lb 6.4 oz)   10/06/22 109 kg (240 lb)   06/17/21 118 kg (260 lb)        Intake/Output Summary (Last 24 hours) at 10/14/2022 1150  Last data filed at 10/14/2022 1006  Gross per 24 hour   Intake 620 ml   Output 600 ml   Net 20 ml     Diet Regular  ----------------------------------------------------------------------------------------------------------------------      Physical Exam:    Constitutional:  male is sitting up in bed on room air in no apparent distress.   HENT:  Head: Normocephalic and atraumatic.  Mouth:  Moist mucous membranes.  Poor dentition.  Eyes:  Conjunctivae and EOM are normal.  No scleral icterus.  Neck:  Neck supple.  No JVD present.    Cardiovascular:  Normal rate, regular rhythm and normal heart sounds with no murmur. No edema.  Pulmonary/Chest:  No respiratory distress, no wheezes, no  crackles, with normal breath sounds and good air movement.  Abdominal:  Soft.  Bowel sounds are normal.  No distension and no tenderness. Large hernia of the left sided abdomen.  Chronic indwelling Martin catheter with clear yellow urine in collection bag.  Musculoskeletal:  No tenderness and no deformity.  No swelling or redness of joints.  1+ edema of bilateral lower extremities with venous stasis changes.  Significant right hip pain to touch her movement with Hemovac drain now in place.  Neurological:  Alert and oriented to person, place, and time.  No facial droop.  No slurred speech.   Right upper extremity chronic weakness.  Skin:  Skin is warm and dry.  No rash noted.  No pallor.  Venous stasis changes of bilateral lower extremities.  Psychiatric:  Normal mood and affect.  Behavior is normal.       ----------------------------------------------------------------------------------------------------------------------            Results from last 7 days   Lab Units 10/14/22  0450 10/13/22  0930 10/13/22  0118 10/12/22  0056 10/11/22  0210 10/10/22  1103 10/10/22  0111 10/09/22  0231   CRP mg/dL  --  19.13*  --   --  18.80*  --   --  17.62*   LACTATE mmol/L  --  0.8  --   --   --   --   --   --    WBC 10*3/mm3 17.52*  --  23.92* 11.82* 10.83*  --    < > 12.59*   HEMOGLOBIN g/dL 12.4*  --  13.9 12.6* 12.4*  --    < > 12.1*   HEMATOCRIT % 38.3  --  41.8 37.4* 37.6  --    < > 35.8*   MCV fL 90.3  --  90.3 88.4 89.3  --    < > 88.2   MCHC g/dL 32.4  --  33.3 33.7 33.0  --    < > 33.8   PLATELETS 10*3/mm3 319  --  313 294 239  --    < > 193   INR   --   --   --   --   --  1.04  --   --     < > = values in this interval not displayed.     Results from last 7 days   Lab Units 10/14/22  0450 10/13/22  0118 10/12/22  0056   SODIUM mmol/L 137 132* 134*   POTASSIUM mmol/L 4.1 5.1 4.0   CHLORIDE mmol/L 103 98 98   CO2 mmol/L 23.2 20.1* 25.1   BUN mg/dL 30* 25* 21   CREATININE mg/dL 1.30* 1.39* 1.29*   CALCIUM mg/dL 8.9 9.1  9.1   GLUCOSE mg/dL 100* 116* 118*   Estimated Creatinine Clearance: 56.7 mL/min (A) (by C-G formula based on SCr of 1.3 mg/dL (H)).  No results found for: AMMONIA    No results found for: HGBA1C, POCGLU  No results found for: HGBA1C  No results found for: TSH, FREET4    Blood Culture   Date Value Ref Range Status   10/09/2022 Abnormal Stain (C)  Preliminary   10/09/2022 Abnormal Stain (C)  Preliminary   10/07/2022 Klebsiella pneumoniae ESBL (C)  Final     Comment:       Consider infectious disease consult.  Susceptibility results may not correlate to clinical outcomes.  For ESBL-producing infections in the blood, a carbapenem is recommended as first-line therapy for optimal clinical outcomes.   10/07/2022 Klebsiella pneumoniae ESBL (C)  Final     Comment:       Consider infectious disease consult.  Susceptibility results may not correlate to clinical outcomes.  For ESBL-producing infections in the blood, a carbapenem is recommended as first-line therapy for optimal clinical outcomes.     Urine Culture   Date Value Ref Range Status   10/07/2022 >100,000 CFU/mL Klebsiella pneumoniae ESBL (A)  Final     Comment:       Consider infectious disease consult.  Susceptibility results may not correlate to clinical outcomes.     No results found for: WOUNDCX  No results found for: STOOLCX  No results found for: RESPCX  Pain Management Panel    There is no flowsheet data to display.           ----------------------------------------------------------------------------------------------------------------------  Imaging Results (Last 24 Hours)     ** No results found for the last 24 hours. **          --------------------------------------------------------------------------------------    Pertinent Infectious Disease Results     Urinalysis on 10/7/2022 was positive with 31-50 WBCs and 4+ bacteria.  Urine culture finalized as greater than 100,000 colonies of ESBL E. coli.  Chest x-ray on 10/7/2022 showed cardiomegaly.  Coarsened  interstitial markings noted throughout the lungs.  X-ray of the right hip on 10/6/2022 showed very advanced right hip osteoarthritic change and possibly chronic AVN with deformity of the femoral head contour.  CT pelvis on 10/6/2022 showed there is a large anterior abdominal wall ventral hernia containing nondilated large and small bowel.  Again there is marked deformity of the right humeral head likely related to chronic AVN or advanced osteoarthritis.  No fracture line is evident.  MRI of the right hip on 10/7/2022 showed again noted are fairly advanced changes of osteoarthritic change and joint space collapse of the right hip.  Subchondral cystic change of both the femoral head and acetabulum are noted.  While no characteristic findings of AVN are noted this may represent sequelae of chronic AVN and subcortical collapse.  Small right hip joint effusion that could be reactive secondary to advanced osteoarthritic change.  In setting of fever and hip pain chronic septic arthritis could also be considered but the morphologic alteration of the joint do not appear to be acute.  COVID-19 and flu A/B PCR on 10/7/2022 was negative.  Blood cultures on 10/7/2022 finalized as ESBL Klebsiella pneumoniae.         Assessment/Plan         ASSESSMENT:    Sepsis present on admission  ESBL Klebsiella pneumoniae bacteremia  ESBL Klebsiella pneumoniae complicated UTI      PLAN:    I have seen and examined the patient myself this morning with KANDICE Ordoñez and I discussed the plan of care with her and primary RN and here are my findings:    Patient had developed new onset diarrhea yesterday with 7 documented bowel movements on 10/13/2022 with significant worsening of his leukocytosis up to 23,000 and was initiated empirically on oral vancomycin 125 mg p.o. every 6 hours with improving of his WBC down to 17.52.    I discussed the case multiple occasions with Dr. Hampton primary team and patient just came back from the  OR.    Patient is status post right hip surgical washout and has a Hemovac now and continues to have significant right hip pain but without any erythema or purulent drainage.  Intraoperative report was reviewed.    For now would recommend to continue with ertapenem treatment and empiric oral vancomycin even though his C. difficile antigen was negative.  C. difficile toxin PCR was positive.      Code Status:   Code Status and Medical Interventions:   Ordered at: 10/07/22 1416     Code Status (Patient has no pulse and is not breathing):    CPR (Attempt to Resuscitate)     Medical Interventions (Patient has pulse or is breathing):    Full Support       KANDICE Ordoñez  10/14/22  11:50 EDT    Electronically signed by KANDICE Ordoñez, 10/14/22, 11:55 AM EDT.      Electronically signed by Olena Ramsay MD, 10/14/22, 12:26 PM EDT.

## 2022-10-14 NOTE — PROGRESS NOTES
Patient Identification:  Name:  Solitario Mcdaniel  Age:  71 y.o.  Sex:  male  :  1950  MRN:  [id number]  Visit Number:  [id number]  Primary Care Provider:  Corrine Mcdaniel APRN    Length of stay:  7    Subjective/Interval History/Consultants/Procedures     Chief Complaint:   Chief Complaint   Patient presents with   • Hip Pain       Subjective/Interval History:    71 y.o. male who was admitted on 10/7/2022 with sepsis secondary to acute cystitis in setting of indwelling Martin catheter    PMH is significant for arthritis hypertension BPH.  For complete admission information, please see history and physical.     Consultations:  Orthopedic surgery  Infectious disease    Procedures/Scans:  CT pelvis without contrast on 10/6  MRI right hip without contrast 10/7  Right hip joint aspiration 10/10  Right hip incision and drainage with drain insertion 10/14    Today, the patient underwent right hip incision and drainage with drain placement.  At the time of my exam patient is resting in bed awake and alert.  He endorses some pain at this time in his hip that he states is controlled with current regimen.  He denies abdominal pain but states he does not want to eat due to frequent diarrhea.  Bowel sounds were present on exam but diminished compared to prior exams and he states he has not passed any gas since the procedure.  He denies chest pain, shortness of breath, nausea, vomiting.  His only complaint at this time is that his room is cold.      ----------------------------------------------------------------------------------------------------------------------  Current Hospital Meds:  amLODIPine, 10 mg, Oral, Daily  ertapenem, 1 g, Intravenous, Q24H  [MAR Hold] heparin (porcine), 5,000 Units, Subcutaneous, Q8H  metoprolol tartrate, 25 mg, Oral, BID  [MAR Hold] polyethylene glycol, 17 g, Oral, Daily  [MAR Hold] prenatal vitamin 27-0.8, 1 tablet, Oral, Daily  [MAR Hold] sodium chloride, 10 mL, Intravenous,  Q12H  [MAR Hold] tamsulosin, 0.4 mg, Oral, Daily  vancomycin, 125 mg, Oral, Q6H  [MAR Hold] vitamin D3, 5,000 Units, Oral, Daily      Pharmacy Consult,   sodium chloride, 125 mL/hr, Last Rate: 125 mL/hr (10/14/22 0142)      ----------------------------------------------------------------------------------------------------------------------      Objective     Vital Signs:  Temp:  [97.7 °F (36.5 °C)-98.7 °F (37.1 °C)] 97.7 °F (36.5 °C)  Heart Rate:  [70-84] 70  Resp:  [11-20] 11  BP: (115-143)/(54-75) 139/64      10/07/22  1008 10/07/22  1622   Weight: 109 kg (240 lb 4.8 oz) 110 kg (242 lb 6.4 oz)     Body mass index is 42.94 kg/m².    Intake/Output Summary (Last 24 hours) at 10/14/2022 0946  Last data filed at 10/14/2022 0928  Gross per 24 hour   Intake 620 ml   Output 600 ml   Net 20 ml     I/O this shift:  In: 500 [I.V.:500]  Out: -   NPO Diet NPO Type: Strict NPO  ----------------------------------------------------------------------------------------------------------------------    Physical Exam  Vitals and nursing note reviewed.   Constitutional:       Appearance: He is ill-appearing.   HENT:      Nose:      Comments: Dry, flaky skin around nose     Mouth/Throat:      Comments: Dry skin around mouth  Eyes:      Conjunctiva/sclera: Conjunctivae normal.      Pupils: Pupils are equal, round, and reactive to light.   Cardiovascular:      Rate and Rhythm: Normal rate and regular rhythm.      Pulses: Normal pulses.      Heart sounds: Normal heart sounds.   Pulmonary:      Effort: Pulmonary effort is normal.      Breath sounds: Normal breath sounds.   Abdominal:      Palpations: Abdomen is soft.      Tenderness: There is no abdominal tenderness.   Musculoskeletal:      Cervical back: Normal range of motion.      Right lower leg: Edema present.      Left lower leg: Edema present.      Comments: Bilateral dorsalis pedis pulses present   Skin:     General: Skin is warm.   Neurological:      Mental Status: He is alert  and oriented to person, place, and time.              ----------------------------------------------------------------------------------------------------------------------        ----------------------------------------------------------------------------------------------------------------------      Results from last 7 days   Lab Units 10/14/22  0450 10/13/22  0930 10/13/22  0118 10/12/22  0056 10/11/22  0210 10/10/22  1103 10/10/22  0111 10/09/22  0231 10/07/22  1138 10/07/22  1045   CRP mg/dL  --  19.13*  --   --  18.80*  --   --  17.62*   < >  --    LACTATE mmol/L  --  0.8  --   --   --   --   --   --   --  0.9   WBC 10*3/mm3 17.52*  --  23.92* 11.82* 10.83*  --    < > 12.59*   < > 15.83*   HEMOGLOBIN g/dL 12.4*  --  13.9 12.6* 12.4*  --    < > 12.1*   < > 13.2   HEMATOCRIT % 38.3  --  41.8 37.4* 37.6  --    < > 35.8*   < > 38.5   MCV fL 90.3  --  90.3 88.4 89.3  --    < > 88.2   < > 87.1   MCHC g/dL 32.4  --  33.3 33.7 33.0  --    < > 33.8   < > 34.3   PLATELETS 10*3/mm3 319  --  313 294 239  --    < > 193   < > 203   INR   --   --   --   --   --  1.04  --   --   --   --     < > = values in this interval not displayed.         Results from last 7 days   Lab Units 10/14/22  0450 10/13/22  0118 10/12/22  0056 10/08/22  0055 10/07/22  1138   SODIUM mmol/L 137 132* 134*   < > 141   POTASSIUM mmol/L 4.1 5.1 4.0   < > 4.3   CHLORIDE mmol/L 103 98 98   < > 103   CO2 mmol/L 23.2 20.1* 25.1   < > 23.3   BUN mg/dL 30* 25* 21   < > 23   CREATININE mg/dL 1.30* 1.39* 1.29*   < > 1.15   CALCIUM mg/dL 8.9 9.1 9.1   < > 9.4   GLUCOSE mg/dL 100* 116* 118*   < > 134*   ALBUMIN g/dL  --   --   --   --  3.61   BILIRUBIN mg/dL  --   --   --   --  0.9   ALK PHOS U/L  --   --   --   --  70   AST (SGOT) U/L  --   --   --   --  61*   ALT (SGPT) U/L  --   --   --   --  28    < > = values in this interval not displayed.   Estimated Creatinine Clearance: 56.7 mL/min (A) (by C-G formula based on SCr of 1.3 mg/dL (H)).  No results found  for: AMMONIA      Blood Culture   Date Value Ref Range Status   10/12/2022 No growth at 24 hours  Preliminary   10/09/2022 Klebsiella pneumoniae ssp pneumoniae (C)  Final   10/09/2022 Klebsiella pneumoniae ssp pneumoniae (C)  Final   10/07/2022 Klebsiella pneumoniae ESBL (C)  Final     Comment:       Consider infectious disease consult.  Susceptibility results may not correlate to clinical outcomes.  For ESBL-producing infections in the blood, a carbapenem is recommended as first-line therapy for optimal clinical outcomes.   10/07/2022 Klebsiella pneumoniae ESBL (C)  Final     Comment:       Consider infectious disease consult.  Susceptibility results may not correlate to clinical outcomes.  For ESBL-producing infections in the blood, a carbapenem is recommended as first-line therapy for optimal clinical outcomes.     Urine Culture   Date Value Ref Range Status   10/07/2022 >100,000 CFU/mL Klebsiella pneumoniae ESBL (A)  Final     Comment:       Consider infectious disease consult.  Susceptibility results may not correlate to clinical outcomes.     No results found for: WOUNDCX  No results found for: STOOLCX  ----------------------------------------------------------------------------------------------------------------------  Imaging Results (Last 24 Hours)     ** No results found for the last 24 hours. **        ----------------------------------------------------------------------------------------------------------------------   I have reviewed the above laboratory values for 10/14/22    Assessment/Plan     Active Hospital Problems    Diagnosis  POA   • Septic hip (HCC) [M00.9]  Yes         ASSESSMENT/PLAN:    Sepsis likely secondary to UTI  Klebsiella and ESBL bacteremia  Right hip avascular necrosis, septic arthritis  Infectious disease is following and recommends joint washout  Joint fluid culture preliminary result shows 3+ gram-negative bacilli  Continue Invanz  Pain control as needed Percocet  Flexeril as  needed for muscle spasm  PT OT consult  Temp trending within normal limits this morning  WBC count decreased to 17.52 this a.m.  Incision and drainage performed at this AM with drain placement   Placed on O2 therapy titrating to 96% per policy will reassess tomorrow  Denied continued care due to insurance  CRP drawn yesterday 10/13 remains elevated at 19.130, lactate within normal limits at 0.8  Repeat CBC in the a.m.    C. Difficile  After discussing with infectious disease several watery bowel movements yesterday with typical C. Difficile odor decision was made to test for C. Difficile  Oral vancomycin was started on 10/13 at 1730 will be continued for 10 days  Patient placed on contact isolation  Continue to monitor        Acute kidney injury   Potential etiologies sepsis VS.  Meds vS.  Fluid status  Creatinine improved  this AM-1.30  Light yellow urine in collection bag at time of my exam  Encourage patient to drink fluids (after procedure)  Fluids increased to 125 mL/h yesterday 10/13  Monitor inputs outputs  Repeat BMP in the a.m.     Hypertension  BP adequately controlled at this time  Continue metoprolol and Norvasc  Holding parameters are in place to prevent hypotension/bradycardia  Continue to monitor     BPH/urinary retention  Indwelling Martin in place  Catheter care per protocol  Continue Flomax    -----------  -DVT prophylaxis: SCDS  -Disposition plans/anticipated needs: Pending clinical course likely SNF        The patient is high risk due to the following diagnoses/reasons: Postoperative, sepsis        Sahil Menon PA-C  10/14/22  09:46 EDT

## 2022-10-15 LAB
ANION GAP SERPL CALCULATED.3IONS-SCNC: 10 MMOL/L (ref 5–15)
BUN SERPL-MCNC: 27 MG/DL (ref 8–23)
BUN/CREAT SERPL: 23.3 (ref 7–25)
CALCIUM SPEC-SCNC: 8.5 MG/DL (ref 8.6–10.5)
CHLORIDE SERPL-SCNC: 102 MMOL/L (ref 98–107)
CO2 SERPL-SCNC: 20 MMOL/L (ref 22–29)
CREAT SERPL-MCNC: 1.16 MG/DL (ref 0.76–1.27)
DEPRECATED RDW RBC AUTO: 52.7 FL (ref 37–54)
EGFRCR SERPLBLD CKD-EPI 2021: 66.9 ML/MIN/1.73
ERYTHROCYTE [DISTWIDTH] IN BLOOD BY AUTOMATED COUNT: 15.4 % (ref 12.3–15.4)
GLUCOSE SERPL-MCNC: 115 MG/DL (ref 65–99)
HBA1C MFR BLD: 5.6 % (ref 4.8–5.6)
HCT VFR BLD AUTO: 38 % (ref 37.5–51)
HGB BLD-MCNC: 12.1 G/DL (ref 13–17.7)
MCH RBC QN AUTO: 29.6 PG (ref 26.6–33)
MCHC RBC AUTO-ENTMCNC: 31.8 G/DL (ref 31.5–35.7)
MCV RBC AUTO: 92.9 FL (ref 79–97)
PLATELET # BLD AUTO: 321 10*3/MM3 (ref 140–450)
PMV BLD AUTO: 9.8 FL (ref 6–12)
POTASSIUM SERPL-SCNC: 4.5 MMOL/L (ref 3.5–5.2)
RBC # BLD AUTO: 4.09 10*6/MM3 (ref 4.14–5.8)
SODIUM SERPL-SCNC: 132 MMOL/L (ref 136–145)
WBC NRBC COR # BLD: 12.74 10*3/MM3 (ref 3.4–10.8)

## 2022-10-15 PROCEDURE — 99232 SBSQ HOSP IP/OBS MODERATE 35: CPT | Performed by: HOSPITALIST

## 2022-10-15 PROCEDURE — 83036 HEMOGLOBIN GLYCOSYLATED A1C: CPT | Performed by: HOSPITALIST

## 2022-10-15 PROCEDURE — 25010000002 CEFAZOLIN PER 500 MG: Performed by: ORTHOPAEDIC SURGERY

## 2022-10-15 PROCEDURE — 85027 COMPLETE CBC AUTOMATED: CPT | Performed by: ORTHOPAEDIC SURGERY

## 2022-10-15 PROCEDURE — 99232 SBSQ HOSP IP/OBS MODERATE 35: CPT | Performed by: NURSE PRACTITIONER

## 2022-10-15 PROCEDURE — 25010000002 ERTAPENEM PER 500 MG: Performed by: ORTHOPAEDIC SURGERY

## 2022-10-15 PROCEDURE — 80048 BASIC METABOLIC PNL TOTAL CA: CPT | Performed by: ORTHOPAEDIC SURGERY

## 2022-10-15 PROCEDURE — 25010000002 HEPARIN (PORCINE) PER 1000 UNITS: Performed by: ORTHOPAEDIC SURGERY

## 2022-10-15 RX ORDER — KETOCONAZOLE 20 MG/ML
SHAMPOO TOPICAL DAILY
Status: COMPLETED | OUTPATIENT
Start: 2022-10-15 | End: 2022-10-19

## 2022-10-15 RX ADMIN — PRENATAL VIT W/ FE FUMARATE-FA TAB 27-0.8 MG 1 TABLET: 27-0.8 TAB at 08:54

## 2022-10-15 RX ADMIN — METOPROLOL TARTRATE 25 MG: 25 TABLET, FILM COATED ORAL at 08:54

## 2022-10-15 RX ADMIN — VANCOMYCIN HYDROCHLORIDE 125 MG: 125 CAPSULE ORAL at 23:37

## 2022-10-15 RX ADMIN — HYDROCODONE BITARTRATE AND ACETAMINOPHEN 1 TABLET: 7.5; 325 TABLET ORAL at 09:44

## 2022-10-15 RX ADMIN — Medication 10 ML: at 20:18

## 2022-10-15 RX ADMIN — Medication 5000 UNITS: at 08:54

## 2022-10-15 RX ADMIN — KETOCONAZOLE: 20 SHAMPOO, SUSPENSION TOPICAL at 14:18

## 2022-10-15 RX ADMIN — Medication 10 ML: at 09:01

## 2022-10-15 RX ADMIN — VANCOMYCIN HYDROCHLORIDE 125 MG: 125 CAPSULE ORAL at 17:47

## 2022-10-15 RX ADMIN — VANCOMYCIN HYDROCHLORIDE 125 MG: 125 CAPSULE ORAL at 11:11

## 2022-10-15 RX ADMIN — HEPARIN SODIUM 5000 UNITS: 5000 INJECTION INTRAVENOUS; SUBCUTANEOUS at 13:55

## 2022-10-15 RX ADMIN — Medication 10 ML: at 08:55

## 2022-10-15 RX ADMIN — Medication 10 ML: at 20:19

## 2022-10-15 RX ADMIN — HEPARIN SODIUM 5000 UNITS: 5000 INJECTION INTRAVENOUS; SUBCUTANEOUS at 20:16

## 2022-10-15 RX ADMIN — VANCOMYCIN HYDROCHLORIDE 125 MG: 125 CAPSULE ORAL at 05:39

## 2022-10-15 RX ADMIN — HEPARIN SODIUM 5000 UNITS: 5000 INJECTION INTRAVENOUS; SUBCUTANEOUS at 05:39

## 2022-10-15 RX ADMIN — HYDROCODONE BITARTRATE AND ACETAMINOPHEN 1 TABLET: 7.5; 325 TABLET ORAL at 15:20

## 2022-10-15 RX ADMIN — Medication 5 MG: at 20:18

## 2022-10-15 RX ADMIN — CEFAZOLIN 2 G: 2 INJECTION, POWDER, FOR SOLUTION INTRAMUSCULAR; INTRAVENOUS at 00:48

## 2022-10-15 RX ADMIN — AMLODIPINE BESYLATE 10 MG: 10 TABLET ORAL at 08:54

## 2022-10-15 RX ADMIN — ERTAPENEM SODIUM 1 G: 1 INJECTION, POWDER, LYOPHILIZED, FOR SOLUTION INTRAMUSCULAR; INTRAVENOUS at 11:10

## 2022-10-15 RX ADMIN — HYDROCODONE BITARTRATE AND ACETAMINOPHEN 1 TABLET: 7.5; 325 TABLET ORAL at 20:18

## 2022-10-15 RX ADMIN — SODIUM CHLORIDE 125 ML/HR: 9 INJECTION, SOLUTION INTRAVENOUS at 01:56

## 2022-10-15 RX ADMIN — FERROUS SULFATE TAB 325 MG (65 MG ELEMENTAL FE) 325 MG: 325 (65 FE) TAB at 08:54

## 2022-10-15 RX ADMIN — TAMSULOSIN HYDROCHLORIDE 0.4 MG: 0.4 CAPSULE ORAL at 08:54

## 2022-10-15 NOTE — PLAN OF CARE
Goal Outcome Evaluation:              Outcome Evaluation: patient has rested well during shift. pleasant with no complaints.hemovac in place and draining well.

## 2022-10-15 NOTE — PROGRESS NOTES
PROGRESS NOTE         Patient Identification:  Name:  Solitario Mcdaniel  Age:  71 y.o.  Sex:  male  :  1950  MRN:  [id number]  Visit Number:  [id number]  Primary Care Provider:  Corrine Mcdaniel APRN         LOS: 8 days       ----------------------------------------------------------------------------------------------------------------------  Subjective       Chief Complaints:    Hip Pain        Interval History:      Patient resting comfortably in bed this morning.  Reports improvement in right hip tightness.  Hemovac in place with bloody drainage noted.  Currently on room air with no apparent distress.  Afebrile.  Reports improvement in diarrhea.  WBC improved at 12.74.  Right hip wound culture from 10/14/2022 remains in process.     Review of Systems:    Constitutional: no fever, chills and night sweats. No appetite change or unexpected weight change.  Fatigue and generalized weakness.  Eyes: no eye drainage, itching or redness.  HEENT: no mouth sores, dysphagia or nose bleed.  Respiratory: no for shortness of breath, cough or production of sputum.  Cardiovascular: no chest pain, no palpitations, no orthopnea.   Gastrointestinal: no nausea, vomiting. Positive diarrhea, improving. No abdominal pain, hematemesis or rectal bleeding.  Genitourinary: no dysuria or polyuria.  Hematologic/lymphatic: no lymph node abnormalities, no easy bruising or easy bleeding.  Musculoskeletal:  Right hip pain.  Skin: No rash and no itching.  Neurological: no loss of consciousness, no seizure, no headache.  Chronic right upper extremity weakness.  Behavioral/Psych: no depression or suicidal ideation.  Endocrine: no hot flashes.  Immunologic: negative.      ----------------------------------------------------------------------------------------------------------------------      Objective       [facility] Meds:  amLODIPine, 10 mg, Oral, Daily  ertapenem, 1 g, Intravenous, Q24H  ferrous sulfate, 325 mg,  Oral, Daily With Breakfast  heparin (porcine), 5,000 Units, Subcutaneous, Q8H  ketoconazole, , Topical, Daily  metoprolol tartrate, 25 mg, Oral, BID  polyethylene glycol, 17 g, Oral, Daily  prenatal vitamin 27-0.8, 1 tablet, Oral, Daily  sodium chloride, 10 mL, Intravenous, Q12H  sodium chloride, 10 mL, Intravenous, Q12H  sodium chloride, 10 mL, Intravenous, Q12H  tamsulosin, 0.4 mg, Oral, Daily  vancomycin, 125 mg, Oral, Q6H  vitamin D3, 5,000 Units, Oral, Daily      Pharmacy Consult,       ----------------------------------------------------------------------------------------------------------------------    Vital Signs:  Temp:  [98.3 °F (36.8 °C)-98.9 °F (37.2 °C)] 98.8 °F (37.1 °C)  Heart Rate:  [60-75] 75  Resp:  [16-18] 18  BP: (112-144)/(55-64) 144/59  No data found.  SpO2 Percentage    10/14/22 1415 10/14/22 1515 10/14/22 1615   SpO2: 95% 98% 97%     SpO2:  [95 %-98 %] 97 %  on  Flow (L/min):  [2] 2;   Device (Oxygen Therapy): nasal cannula    Body mass index is 42.94 kg/m².  Wt Readings from Last 3 Encounters:   10/07/22 110 kg (242 lb 6.4 oz)   10/06/22 109 kg (240 lb)   06/17/21 118 kg (260 lb)        Intake/Output Summary (Last 24 hours) at 10/15/2022 1223  Last data filed at 10/15/2022 0990  Gross per 24 hour   Intake 4670 ml   Output 1260 ml   Net 3410 ml     Diet Regular  ----------------------------------------------------------------------------------------------------------------------      Physical Exam:    Constitutional:  male is sitting up in bed on room air in no apparent distress.   HENT:  Head: Normocephalic and atraumatic.  Mouth:  Moist mucous membranes.  Poor dentition.  Eyes:  Conjunctivae and EOM are normal.  No scleral icterus.  Neck:  Neck supple.  No JVD present.    Cardiovascular:  Normal rate, regular rhythm and normal heart sounds with no murmur. No edema.  Pulmonary/Chest:  No respiratory distress, no wheezes, no crackles, with normal breath sounds and good air  movement.  Abdominal:  Soft.  Bowel sounds are normal.  No distension and no tenderness. Large hernia of the left sided abdomen.  Chronic indwelling Martin catheter with clear yellow urine in collection bag.  Musculoskeletal:  No tenderness and no deformity.  No swelling or redness of joints.  1+ edema of bilateral lower extremities with venous stasis changes.  Right hip pain with movement.  Hemovac drain in place with bloody drainage.   Neurological:  Alert and oriented to person, place, and time.  No facial droop.  No slurred speech.   Right upper extremity chronic weakness.  Skin:  Skin is warm and dry.  No rash noted.  No pallor.  Venous stasis changes of bilateral lower extremities.  Psychiatric:  Normal mood and affect.  Behavior is normal.       ----------------------------------------------------------------------------------------------------------------------            Results from last 7 days   Lab Units 10/15/22  0454 10/14/22  0450 10/13/22  0930 10/13/22  0118 10/12/22  0056 10/11/22  0210 10/10/22  1103 10/10/22  0111 10/09/22  0231   CRP mg/dL  --   --  19.13*  --   --  18.80*  --   --  17.62*   LACTATE mmol/L  --   --  0.8  --   --   --   --   --   --    WBC 10*3/mm3 12.74* 17.52*  --  23.92*   < > 10.83*  --    < > 12.59*   HEMOGLOBIN g/dL 12.1* 12.4*  --  13.9   < > 12.4*  --    < > 12.1*   HEMATOCRIT % 38.0 38.3  --  41.8   < > 37.6  --    < > 35.8*   MCV fL 92.9 90.3  --  90.3   < > 89.3  --    < > 88.2   MCHC g/dL 31.8 32.4  --  33.3   < > 33.0  --    < > 33.8   PLATELETS 10*3/mm3 321 319  --  313   < > 239  --    < > 193   INR   --   --   --   --   --   --  1.04  --   --     < > = values in this interval not displayed.     Results from last 7 days   Lab Units 10/15/22  0454 10/14/22  0450 10/13/22  0118   SODIUM mmol/L 132* 137 132*   POTASSIUM mmol/L 4.5 4.1 5.1   CHLORIDE mmol/L 102 103 98   CO2 mmol/L 20.0* 23.2 20.1*   BUN mg/dL 27* 30* 25*   CREATININE mg/dL 1.16 1.30* 1.39*   CALCIUM  mg/dL 8.5* 8.9 9.1   GLUCOSE mg/dL 115* 100* 116*   Estimated Creatinine Clearance: 63.6 mL/min (by C-G formula based on SCr of 1.16 mg/dL).  No results found for: AMMONIA    Hemoglobin A1C   Date/Time Value Ref Range Status   10/15/2022 0454 5.60 4.80 - 5.60 % Final     Lab Results   Component Value Date    HGBA1C 5.60 10/15/2022     No results found for: TSH, FREET4    Blood Culture   Date Value Ref Range Status   10/09/2022 Abnormal Stain (C)  Preliminary   10/09/2022 Abnormal Stain (C)  Preliminary   10/07/2022 Klebsiella pneumoniae ESBL (C)  Final     Comment:       Consider infectious disease consult.  Susceptibility results may not correlate to clinical outcomes.  For ESBL-producing infections in the blood, a carbapenem is recommended as first-line therapy for optimal clinical outcomes.   10/07/2022 Klebsiella pneumoniae ESBL (C)  Final     Comment:       Consider infectious disease consult.  Susceptibility results may not correlate to clinical outcomes.  For ESBL-producing infections in the blood, a carbapenem is recommended as first-line therapy for optimal clinical outcomes.     Urine Culture   Date Value Ref Range Status   10/07/2022 >100,000 CFU/mL Klebsiella pneumoniae ESBL (A)  Final     Comment:       Consider infectious disease consult.  Susceptibility results may not correlate to clinical outcomes.     No results found for: WOUNDCX  No results found for: STOOLCX  No results found for: RESPCX  Pain Management Panel    There is no flowsheet data to display.           ----------------------------------------------------------------------------------------------------------------------  Imaging Results (Last 24 Hours)     ** No results found for the last 24 hours. **          --------------------------------------------------------------------------------------    Pertinent Infectious Disease Results     Urinalysis on 10/7/2022 was positive with 31-50 WBCs and 4+ bacteria.  Urine culture finalized as  greater than 100,000 colonies of ESBL E. coli.  Chest x-ray on 10/7/2022 showed cardiomegaly.  Coarsened interstitial markings noted throughout the lungs.  X-ray of the right hip on 10/6/2022 showed very advanced right hip osteoarthritic change and possibly chronic AVN with deformity of the femoral head contour.  CT pelvis on 10/6/2022 showed there is a large anterior abdominal wall ventral hernia containing nondilated large and small bowel.  Again there is marked deformity of the right humeral head likely related to chronic AVN or advanced osteoarthritis.  No fracture line is evident.  MRI of the right hip on 10/7/2022 showed again noted are fairly advanced changes of osteoarthritic change and joint space collapse of the right hip.  Subchondral cystic change of both the femoral head and acetabulum are noted.  While no characteristic findings of AVN are noted this may represent sequelae of chronic AVN and subcortical collapse.  Small right hip joint effusion that could be reactive secondary to advanced osteoarthritic change.  In setting of fever and hip pain chronic septic arthritis could also be considered but the morphologic alteration of the joint do not appear to be acute.  COVID-19 and flu A/B PCR on 10/7/2022 was negative.  Blood cultures on 10/7/2022 finalized as ESBL Klebsiella pneumoniae.         Assessment/Plan         ASSESSMENT:    Sepsis present on admission  ESBL Klebsiella pneumoniae bacteremia  ESBL Klebsiella pneumoniae complicated UTI      PLAN:    Patient resting comfortably in bed this morning.  Reports improvement in right hip tightness.  Hemovac in place with bloody drainage noted.  Currently on room air with no apparent distress.  Afebrile.  Reports improvement in diarrhea.  WBC improved at 12.74.  Right hip wound culture from 10/14/2022 remains in process.     Based on finalization of previous right hip wound culture results recommend to continue Invanz.  Recommend to continue p.o. vancomycin  for treatment of C. difficile due to improvement of diarrhea and C. difficile toxin PCR was positive although antigen was negative.    Code Status:   Code Status and Medical Interventions:   Ordered at: 10/07/22 1416     Code Status (Patient has no pulse and is not breathing):    CPR (Attempt to Resuscitate)     Medical Interventions (Patient has pulse or is breathing):    Full Support       KANDICE Ordoñez  10/15/22  12:23 EDT    Electronically signed by KANDICE Ordoñez, 10/15/22, 12:25 PM EDT.

## 2022-10-15 NOTE — PROGRESS NOTES
Inpatient Progress Note  Solitario Mcdaniel  Date: 10/15/22  MRN: [id number]    Subjective:  Resting in bed.  Notes pain to the right hip with movement, no pain at rest.  He has been up to the bedside but has not ambulated since surgery due to the pain.  He denies chest pain or shortness of air.  (+) BM.  He does admit that the pain has improved some from what he was feeling preop.        Objective:      Vitals:    10/14/22 1615 10/14/22 1853 10/15/22 0300 10/15/22 0600   BP: 127/57 118/62 115/55 144/59   BP Location: Right arm Right arm Right arm Right arm   Patient Position: Lying Lying Lying Lying   Pulse: 63 71 67 75   Resp: 18 18 18 18   Temp: 98.6 °F (37 °C) 98.9 °F (37.2 °C) 98.5 °F (36.9 °C) 98.8 °F (37.1 °C)   TempSrc: Oral Oral Oral Oral   SpO2: 97%      Weight:       Height:              Physical Exam:  Constitutional:  Well-developed and well-nourished.  No acute distress.        Musculoskeletal:  Upon examination of the right hip, the surgical dressing is in place with minimal dried drainage noted.  Hemovac drain in place.  No erythema.  (+) tenderness.  (+) DF/PF at the ankle.  Cap refill is brisk and light touch sensation is intact, distally.  DP pulse 3/4.      Labs:    Results from last 7 days   Lab Units 10/15/22  0454 10/14/22  0450 10/13/22  0930 10/13/22  0118 10/12/22  0056 10/11/22  0210 10/10/22  1103 10/10/22  0111 10/09/22  0231   CRP mg/dL  --   --  19.13*  --   --  18.80*  --   --  17.62*   LACTATE mmol/L  --   --  0.8  --   --   --   --   --   --    WBC 10*3/mm3 12.74* 17.52*  --  23.92*   < > 10.83*  --    < > 12.59*   HEMOGLOBIN g/dL 12.1* 12.4*  --  13.9   < > 12.4*  --    < > 12.1*   HEMATOCRIT % 38.0 38.3  --  41.8   < > 37.6  --    < > 35.8*   MCV fL 92.9 90.3  --  90.3   < > 89.3  --    < > 88.2   MCHC g/dL 31.8 32.4  --  33.3   < > 33.0  --    < > 33.8   PLATELETS 10*3/mm3 321 319  --  313   < > 239  --    < > 193   INR   --   --   --   --   --   --  1.04  --   --     < > = values in  this interval not displayed.         Results from last 7 days   Lab Units 10/15/22  0454 10/14/22  0450 10/13/22  0118   SODIUM mmol/L 132* 137 132*   POTASSIUM mmol/L 4.5 4.1 5.1   CHLORIDE mmol/L 102 103 98   CO2 mmol/L 20.0* 23.2 20.1*   BUN mg/dL 27* 30* 25*   CREATININE mg/dL 1.16 1.30* 1.39*   CALCIUM mg/dL 8.5* 8.9 9.1   GLUCOSE mg/dL 115* 100* 116*   Estimated Creatinine Clearance: 63.6 mL/min (by C-G formula based on SCr of 1.16 mg/dL).  No results found for: AMMONIA          No results found for: HGBA1C  No results found for: TSH, FREET4      Pain Management Panel    There is no flowsheet data to display.         Blood Culture   Date Value Ref Range Status   10/12/2022 No growth at 2 days  Preliminary   10/09/2022 Klebsiella pneumoniae ssp pneumoniae (C)  Final   10/09/2022 Klebsiella pneumoniae ssp pneumoniae (C)  Final     No results found for: URINECX  No results found for: WOUNDCX  No results found for: STOOLCX              Radiology:  Imaging Results (Last 72 Hours)     ** No results found for the last 72 hours. **            Assessment:  S/p Right hip incision, irrigation and drainage with placement of Hemovac drain and antibiotic beads, POD #1.          Plan:    Agree with IV antibiotics pending final culture results.  OK to weight bear as tolerated.  PT/OT for mobility/ambulation.  On Heparin/SCDs for DVT prophylaxis.    Anticipate drain to be pulled in 2-3 days depending on output.    Increase mobility as able.            Bong Mills, APRN October 15, 2022 08:34 EDT

## 2022-10-15 NOTE — PROGRESS NOTES
[facility] HOSPITALIST PROGRESS NOTE     Patient Identification:  Name:  Solitario Mcdaniel  Age:  71 y.o.  Sex:  male  :  1950  MRN:  [id number]  Visit Number:  [id number]  Primary Care Provider:  Corrine Mcdaniel APRN    Length of stay:  8    Subjective: Patient seen and examined assisted by his nurse, patient report  and painful right hip, culture of the joint fluid is Klebsiella pneumonia ESBL positive patient has notable Tinea barbae.  Reports 1 stool small starting to form, white count improving renal functions back to normal.    Chief Complaint: Right hip pain  ----------------------------------------------------------------------------------------------------------------------  [facility] [place] Meds:  amLODIPine, 10 mg, Oral, Daily  ertapenem, 1 g, Intravenous, Q24H  ferrous sulfate, 325 mg, Oral, Daily With Breakfast  heparin (porcine), 5,000 Units, Subcutaneous, Q8H  metoprolol tartrate, 25 mg, Oral, BID  polyethylene glycol, 17 g, Oral, Daily  prenatal vitamin 27-0.8, 1 tablet, Oral, Daily  sodium chloride, 10 mL, Intravenous, Q12H  sodium chloride, 10 mL, Intravenous, Q12H  sodium chloride, 10 mL, Intravenous, Q12H  tamsulosin, 0.4 mg, Oral, Daily  vancomycin, 125 mg, Oral, Q6H  vitamin D3, 5,000 Units, Oral, Daily      Pharmacy Consult,       ----------------------------------------------------------------------------------------------------------------------  Vital Signs:  Temp:  [97.2 °F (36.2 °C)-98.9 °F (37.2 °C)] 98.8 °F (37.1 °C)  Heart Rate:  [60-82] 75  Resp:  [12-18] 18  BP: (112-150)/(55-83) 144/59       Tele:       10/07/22  1008 10/07/22  1622   Weight: 109 kg (240 lb 4.8 oz) 110 kg (242 lb 6.4 oz)     Body mass index is 42.94 kg/m².    Intake/Output Summary (Last 24 hours) at 10/15/2022 0946  Last data filed at 10/15/2022 0300  Gross per 24 hour   Intake 4670 ml   Output 1225 ml   Net 3445 ml     Diet  Regular  ----------------------------------------------------------------------------------------------------------------------  Physical exam:  General: Comfortable,awake, alert, oriented to self, place, and time, obese,  No respiratory distress.    Skin:  Skin is warm and dry. No rash noted. No pallor.    HENT:  Head:  Normocephalic and atraumatic.  Mouth:  Moist mucous membranes.    Tinea barbae noted  Eyes:  Conjunctivae and EOM are normal.  Pupils are equal, round, and reactive to light.  No scleral icterus.    Neck:  Neck supple.  No JVD present.    Pulmonary/Chest:  No respiratory distress, no wheezes, no crackles, with normal breath sounds and good air movement.  Cardiovascular:  Normal rate, regular rhythm and normal heart sounds with no murmur.  Abdominal: No distention no tenderness no guarding bowel sounds present Extremities: Right thigh edema, right hip drain dark bloody drainage noted, posterior present both pedal equal.  Neurological:  Motor strength equal no obvious deficit, sensory grossly intact.   No cranial nerve deficit.  No tongue deviation.  No facial droop.  No slurred speech.    Genitourinary: Chronic indwelling cath  Back:  ----------------------------------------------------------------------------------------------------------------------  ----------------------------------------------------------------------------------------------------------------------      Results from last 7 days   Lab Units 10/15/22  0454 10/14/22  0450 10/13/22  0930 10/13/22  0118 10/12/22  0056 10/11/22  0210 10/10/22  1103 10/10/22  0111 10/09/22  0231   CRP mg/dL  --   --  19.13*  --   --  18.80*  --   --  17.62*   LACTATE mmol/L  --   --  0.8  --   --   --   --   --   --    WBC 10*3/mm3 12.74* 17.52*  --  23.92*   < > 10.83*  --    < > 12.59*   HEMOGLOBIN g/dL 12.1* 12.4*  --  13.9   < > 12.4*  --    < > 12.1*   HEMATOCRIT % 38.0 38.3  --  41.8   < > 37.6  --    < > 35.8*   MCV fL 92.9 90.3  --  90.3   < >  "89.3  --    < > 88.2   MCHC g/dL 31.8 32.4  --  33.3   < > 33.0  --    < > 33.8   PLATELETS 10*3/mm3 321 319  --  313   < > 239  --    < > 193   INR   --   --   --   --   --   --  1.04  --   --     < > = values in this interval not displayed.         Results from last 7 days   Lab Units 10/15/22  0454 10/14/22  0450 10/13/22  0118   SODIUM mmol/L 132* 137 132*   POTASSIUM mmol/L 4.5 4.1 5.1   CHLORIDE mmol/L 102 103 98   CO2 mmol/L 20.0* 23.2 20.1*   BUN mg/dL 27* 30* 25*   CREATININE mg/dL 1.16 1.30* 1.39*   CALCIUM mg/dL 8.5* 8.9 9.1   GLUCOSE mg/dL 115* 100* 116*   Estimated Creatinine Clearance: 63.6 mL/min (by C-G formula based on SCr of 1.16 mg/dL).    No results found for: AMMONIA      Blood Culture   Date Value Ref Range Status   10/12/2022 No growth at 2 days  Preliminary   10/09/2022 Klebsiella pneumoniae ssp pneumoniae (C)  Final   10/09/2022 Klebsiella pneumoniae ssp pneumoniae (C)  Final     No results found for: URINECX  No results found for: WOUNDCX  No results found for: STOOLCX    I have personally looked at the labs and they are summarized above.  ----------------------------------------------------------------------------------------------------------------------  Imaging Results (Last 24 Hours)     ** No results found for the last 24 hours. **        ----------------------------------------------------------------------------------------------------------------------  Assessment and Plan:  -Sepsis present on admission with Klebsiella pneumonia ESBL bacteremia  -Right hip septic arthritis Klebsiella pneumonia ESBL \"  -Acute cystitis without hematuria present admission  -C. difficile diarrhea  -Acute kidney injury resolved  -Tinea Barbae  -Essential hypertension  -Morbid obesity complicating all aspects of care with a BMI of 42.9  -Chronic indwelling catheter due to urinary retention from BPH    Patient on vancomycin and ertapenem per infectious disease recommendation, help appreciated, patient's " diarrhea has resolved, had 1 bowel movement today describes a small and starting to form, white count is improving renal function back to baseline still experiencing pain on his right hip, patient has drain in place after washout.  Orthopedics help appreciated.  In the meantime start exercising physical therapy to minimize stiffness and deconditioning, incentive spirometry, Nasarel shampoo and as a body wash to help with tinea barbae.  DC IV fluids, will check for A1c patient has persistently mild hyperglycemia.    Disposition needs continued care      Petty Hampton MD  10/15/22  09:46 EDT

## 2022-10-15 NOTE — PLAN OF CARE
Goal Outcome Evaluation:           Progress: no change  Outcome Evaluation: pt has rested during shift, hemovac in place, emptied 35ml earlier in shift, no acute changes in patient, has had pain managed with prn pain medication, will continue plan of care

## 2022-10-16 LAB — CRP SERPL-MCNC: 16.29 MG/DL (ref 0–0.5)

## 2022-10-16 PROCEDURE — 99232 SBSQ HOSP IP/OBS MODERATE 35: CPT | Performed by: HOSPITALIST

## 2022-10-16 PROCEDURE — 99232 SBSQ HOSP IP/OBS MODERATE 35: CPT | Performed by: NURSE PRACTITIONER

## 2022-10-16 PROCEDURE — 25010000002 HEPARIN (PORCINE) PER 1000 UNITS: Performed by: ORTHOPAEDIC SURGERY

## 2022-10-16 PROCEDURE — 25010000002 ERTAPENEM PER 500 MG: Performed by: ORTHOPAEDIC SURGERY

## 2022-10-16 PROCEDURE — 86140 C-REACTIVE PROTEIN: CPT | Performed by: ORTHOPAEDIC SURGERY

## 2022-10-16 RX ADMIN — VANCOMYCIN HYDROCHLORIDE 125 MG: 125 CAPSULE ORAL at 05:00

## 2022-10-16 RX ADMIN — HYDROCODONE BITARTRATE AND ACETAMINOPHEN 1 TABLET: 7.5; 325 TABLET ORAL at 08:32

## 2022-10-16 RX ADMIN — Medication 10 ML: at 08:35

## 2022-10-16 RX ADMIN — HEPARIN SODIUM 5000 UNITS: 5000 INJECTION INTRAVENOUS; SUBCUTANEOUS at 05:00

## 2022-10-16 RX ADMIN — TAMSULOSIN HYDROCHLORIDE 0.4 MG: 0.4 CAPSULE ORAL at 08:31

## 2022-10-16 RX ADMIN — HEPARIN SODIUM 5000 UNITS: 5000 INJECTION INTRAVENOUS; SUBCUTANEOUS at 13:35

## 2022-10-16 RX ADMIN — VANCOMYCIN HYDROCHLORIDE 125 MG: 125 CAPSULE ORAL at 17:19

## 2022-10-16 RX ADMIN — KETOCONAZOLE: 20 SHAMPOO, SUSPENSION TOPICAL at 08:40

## 2022-10-16 RX ADMIN — Medication 10 ML: at 21:47

## 2022-10-16 RX ADMIN — AMLODIPINE BESYLATE 10 MG: 10 TABLET ORAL at 08:32

## 2022-10-16 RX ADMIN — HEPARIN SODIUM 5000 UNITS: 5000 INJECTION INTRAVENOUS; SUBCUTANEOUS at 21:46

## 2022-10-16 RX ADMIN — Medication 5000 UNITS: at 08:31

## 2022-10-16 RX ADMIN — HYDROCODONE BITARTRATE AND ACETAMINOPHEN 1 TABLET: 7.5; 325 TABLET ORAL at 02:00

## 2022-10-16 RX ADMIN — METOPROLOL TARTRATE 25 MG: 25 TABLET, FILM COATED ORAL at 21:46

## 2022-10-16 RX ADMIN — ERTAPENEM SODIUM 1 G: 1 INJECTION, POWDER, LYOPHILIZED, FOR SOLUTION INTRAMUSCULAR; INTRAVENOUS at 11:18

## 2022-10-16 RX ADMIN — METOPROLOL TARTRATE 25 MG: 25 TABLET, FILM COATED ORAL at 08:31

## 2022-10-16 RX ADMIN — HYDROCODONE BITARTRATE AND ACETAMINOPHEN 1 TABLET: 7.5; 325 TABLET ORAL at 23:02

## 2022-10-16 RX ADMIN — HYDROCODONE BITARTRATE AND ACETAMINOPHEN 1 TABLET: 7.5; 325 TABLET ORAL at 15:08

## 2022-10-16 RX ADMIN — PRENATAL VIT W/ FE FUMARATE-FA TAB 27-0.8 MG 1 TABLET: 27-0.8 TAB at 08:31

## 2022-10-16 RX ADMIN — FERROUS SULFATE TAB 325 MG (65 MG ELEMENTAL FE) 325 MG: 325 (65 FE) TAB at 08:32

## 2022-10-16 RX ADMIN — Medication 10 ML: at 21:46

## 2022-10-16 RX ADMIN — VANCOMYCIN HYDROCHLORIDE 125 MG: 125 CAPSULE ORAL at 11:32

## 2022-10-16 RX ADMIN — Medication 10 ML: at 08:34

## 2022-10-16 NOTE — PROGRESS NOTES
[facility] HOSPITALIST PROGRESS NOTE     Patient Identification:  Name:  Solitario Mcdaniel  Age:  71 y.o.  Sex:  male  :  1950  MRN:  [id number]  Visit Number:  [id number]  Primary Care Provider:  Corrine Mcdaniel APRN    Length of stay:  9    Subjective: Patient seen and examined assisted by Ms. James RN.  Patient reports last night his hip was hurting more than usual, he has very difficult time with movement of his hip due to the pain.  Advised patient to increase flexion and extension of the hip especially after receiving pain medicine to minimize stiffness and improve chance of ability to walk again.  His Tinea barbae has improved.  Hemovac drain reddish in color,    Chief Complaint: Right hip pain  ----------------------------------------------------------------------------------------------------------------------  Current Hospital Meds:  amLODIPine, 10 mg, Oral, Daily  ertapenem, 1 g, Intravenous, Q24H  ferrous sulfate, 325 mg, Oral, Daily With Breakfast  heparin (porcine), 5,000 Units, Subcutaneous, Q8H  ketoconazole, , Topical, Daily  metoprolol tartrate, 25 mg, Oral, BID  polyethylene glycol, 17 g, Oral, Daily  prenatal vitamin 27-0.8, 1 tablet, Oral, Daily  sodium chloride, 10 mL, Intravenous, Q12H  sodium chloride, 10 mL, Intravenous, Q12H  sodium chloride, 10 mL, Intravenous, Q12H  tamsulosin, 0.4 mg, Oral, Daily  vancomycin, 125 mg, Oral, Q6H  vitamin D3, 5,000 Units, Oral, Daily      Pharmacy Consult,       ----------------------------------------------------------------------------------------------------------------------  Vital Signs:  Temp:  [97.6 °F (36.4 °C)-99.1 °F (37.3 °C)] 97.9 °F (36.6 °C)  Heart Rate:  [62-74] 74  Resp:  [16-18] 16  BP: (109-139)/(53-63) 139/63       Tele:       10/07/22  1008 10/07/22  1622   Weight: 109 kg (240 lb 4.8 oz) 110 kg (242 lb 6.4 oz)     Body mass index is 42.94 kg/m².    Intake/Output Summary (Last 24 hours) at 10/16/2022  1215  Last data filed at 10/16/2022 0800  Gross per 24 hour   Intake 3015 ml   Output 1830 ml   Net 1185 ml     Diet Regular  ----------------------------------------------------------------------------------------------------------------------  Physical exam:  General: In bed he is not orthopneic awake and alert, No respiratory distress.    Skin:  Skin is warm and dry. No rash noted. No pallor.    HENT: Tinea barbae improved, no thrush Head:  Normocephalic and atraumatic.  Mouth:  Moist mucous membranes.    Eyes:  Conjunctivae and EOM are normal.  Pupils are equal, round, and reactive to light.  No scleral icterus.    Neck:  Neck supple.  No JVD present.    Pulmonary/Chest:  No respiratory distress, no wheezes, no crackles, with normal breath sounds and good air movement.  Cardiovascular:  Normal rate, regular rhythm and normal heart sounds with no murmur.  Abdominal: Obese soft.  Bowel sounds are normal.  No distension and no tenderness.   Extremities: Right upper extremity with Hemovac, surgical wound noted There is edema of the thigh, and no deformity.  No red or swollen joints anywhere.  Strong pulses in all 4 extremities with no clubbing, no cyanosis, patient unable to flex his right hip due to pain,   neurological:  Motor strength equal no obvious deficit, sensory grossly intact.   No cranial nerve deficit.  No tongue deviation.  No facial droop.  No slurred speech.    Genitourinary:   Back:  ----------------------------------------------------------------------------------------------------------------------  ----------------------------------------------------------------------------------------------------------------------      Results from last 7 days   Lab Units 10/16/22  1047 10/15/22  0454 10/14/22  0450 10/13/22  0930 10/13/22  0118 10/12/22  0056 10/11/22  0210 10/10/22  1103   CRP mg/dL 16.29*  --   --  19.13*  --   --  18.80*  --    LACTATE mmol/L  --   --   --  0.8  --   --   --   --    WBC  10*3/mm3  --  12.74* 17.52*  --  23.92*   < > 10.83*  --    HEMOGLOBIN g/dL  --  12.1* 12.4*  --  13.9   < > 12.4*  --    HEMATOCRIT %  --  38.0 38.3  --  41.8   < > 37.6  --    MCV fL  --  92.9 90.3  --  90.3   < > 89.3  --    MCHC g/dL  --  31.8 32.4  --  33.3   < > 33.0  --    PLATELETS 10*3/mm3  --  321 319  --  313   < > 239  --    INR   --   --   --   --   --   --   --  1.04    < > = values in this interval not displayed.         Results from last 7 days   Lab Units 10/15/22  0454 10/14/22  0450 10/13/22  0118   SODIUM mmol/L 132* 137 132*   POTASSIUM mmol/L 4.5 4.1 5.1   CHLORIDE mmol/L 102 103 98   CO2 mmol/L 20.0* 23.2 20.1*   BUN mg/dL 27* 30* 25*   CREATININE mg/dL 1.16 1.30* 1.39*   CALCIUM mg/dL 8.5* 8.9 9.1   GLUCOSE mg/dL 115* 100* 116*   Estimated Creatinine Clearance: 63.6 mL/min (by C-G formula based on SCr of 1.16 mg/dL).    No results found for: AMMONIA      Blood Culture   Date Value Ref Range Status   10/12/2022 No growth at 3 days  Preliminary     No results found for: URINECX  Wound Culture   Date Value Ref Range Status   10/14/2022 No growth at 2 days  Preliminary     No results found for: STOOLCX    I have personally looked at the labs and they are summarized above.  ----------------------------------------------------------------------------------------------------------------------  Imaging Results (Last 24 Hours)     ** No results found for the last 24 hours. **        ----------------------------------------------------------------------------------------------------------------------  Assessment and Plan:  -Sepsis present on admission with Klebsiella pneumonia ESBL bacteremia  -Acute cystitis as a complication of indwelling catheter with Klebsiella pneumonia ESBL  -Right hip severe arthritis with septic arthritis  -Acute kidney injury resolved  -C. difficile with diarrhea improved  -Tinea barbae improved  -Essential hypertension  -Morbid obesity complicating all aspects of  care  -Chronic indwelling catheter from urinary tension with BPH  -Hyperglycemia with normal A1c likely related to sepsis    Patient reports persistent pain of right hip, encouraged patient to try flexing and extension of the hip to minimize stiffness and increased chance of mobility and ambulation.  Otherwise patient is high risk for difficulty with ambulation due to immobility.  Incentive spirometry, monitor electrolytes and renal function, orthopedics and infectious disease help appreciated.  Patient will need extended antibiotic regimen, continued care consult.    Disposition continued care consult      Petty Hampton MD  10/16/22  12:15 EDT

## 2022-10-16 NOTE — PLAN OF CARE
Goal Outcome Evaluation:           Progress: no change  Outcome Evaluation: pt has rested during shift, has had pain medications given prn, hemovac in place, no acute changes, will continue plan of care

## 2022-10-16 NOTE — PLAN OF CARE
Goal Outcome Evaluation:              Outcome Evaluation: patient has rested during shift. prn pain medication given per request. hemovac remains in place. patient denies any complaints.

## 2022-10-16 NOTE — PROGRESS NOTES
Inpatient Progress Note  Solitario Mcdaniel  Date: 10/16/22  MRN: [id number]      Subjective:  Resting in bed.  Notes pain to the right hip with movement, no pain at rest.  He has not been out of bed this morning, due to pain.  Notes his pain is about the same as yesterday.  He denies chest pain or shortness of air.          Objective:      Vitals:    10/15/22 1400 10/15/22 1833 10/16/22 0258 10/16/22 0600   BP: 123/53 109/56 138/62 139/63   BP Location: Right arm Right arm Right arm Right arm   Patient Position: Lying Lying Lying Lying   Pulse: 68 65 62 74   Resp: 18 18 16 16   Temp: 97.6 °F (36.4 °C) 99.1 °F (37.3 °C) 98.1 °F (36.7 °C) 97.9 °F (36.6 °C)   TempSrc: Oral Oral Oral Oral   SpO2:  94% 97% 98%   Weight:       Height:              Physical Exam:  Constitutional:  Well-developed and well-nourished.  No acute distress.        Musculoskeletal:  Upon examination of the right hip, the surgical dressing is in place with moderate dried drainage noted.  Hemovac drain in place with bloody drainage.  No erythema to the hip.  (+) tenderness.  (+) DF/PF at the ankle.  Cap refill is brisk and light touch sensation is intact, distally.  DP pulse 3/4.      Labs:    Results from last 7 days   Lab Units 10/15/22  0454 10/14/22  0450 10/13/22  0930 10/13/22  0118 10/12/22  0056 10/11/22  0210 10/10/22  1103   CRP mg/dL  --   --  19.13*  --   --  18.80*  --    LACTATE mmol/L  --   --  0.8  --   --   --   --    WBC 10*3/mm3 12.74* 17.52*  --  23.92*   < > 10.83*  --    HEMOGLOBIN g/dL 12.1* 12.4*  --  13.9   < > 12.4*  --    HEMATOCRIT % 38.0 38.3  --  41.8   < > 37.6  --    MCV fL 92.9 90.3  --  90.3   < > 89.3  --    MCHC g/dL 31.8 32.4  --  33.3   < > 33.0  --    PLATELETS 10*3/mm3 321 319  --  313   < > 239  --    INR   --   --   --   --   --   --  1.04    < > = values in this interval not displayed.         Results from last 7 days   Lab Units 10/15/22  0454 10/14/22  0450 10/13/22  0118   SODIUM mmol/L 132* 137 132*    POTASSIUM mmol/L 4.5 4.1 5.1   CHLORIDE mmol/L 102 103 98   CO2 mmol/L 20.0* 23.2 20.1*   BUN mg/dL 27* 30* 25*   CREATININE mg/dL 1.16 1.30* 1.39*   CALCIUM mg/dL 8.5* 8.9 9.1   GLUCOSE mg/dL 115* 100* 116*   Estimated Creatinine Clearance: 63.6 mL/min (by C-G formula based on SCr of 1.16 mg/dL).  No results found for: AMMONIA          Hemoglobin A1C   Date Value Ref Range Status   10/15/2022 5.60 4.80 - 5.60 % Final     No results found for: TSH, FREET4      Pain Management Panel    There is no flowsheet data to display.         Blood Culture   Date Value Ref Range Status   10/12/2022 No growth at 3 days  Preliminary     No results found for: URINECX  Wound Culture   Date Value Ref Range Status   10/14/2022 No growth  Preliminary     No results found for: STOOLCX              Radiology:  Imaging Results (Last 72 Hours)     ** No results found for the last 72 hours. **            Assessment: S/p Right hip incision, irrigation and drainage with placement of Hemovac drain and antibiotic beads, POD #2.           Plan:    Agree with IV antibiotics, final culture results pending.  Preliminary culture results - no growth.  OK to weight bear as tolerated.  Encourage mobility.  PT/OT for mobility/ambulation.  On Heparin/SCDs for DVT prophylaxis.  Anticipate drain to be pulled in 1-2 days depending on output.    CRP pending.           Bong Mills, KANDICE October 16, 2022 09:02 EDT

## 2022-10-16 NOTE — PROGRESS NOTES
PROGRESS NOTE         Patient Identification:  Name:  Solitario Mcdaniel  Age:  71 y.o.  Sex:  male  :  1950  MRN:  [id number]  Visit Number:  [id number]  Primary Care Provider:  Corrine Mcdaniel APRN         LOS: 9 days       ----------------------------------------------------------------------------------------------------------------------  Subjective       Chief Complaints:    Hip Pain        Interval History:      Patient resting in bed this morning.  Reports continued right hip pain.  Continues on 2 L per nasal cannula with no apparent distress.  WBC improved at 12.74.  CRP improved at 16.29.  Wound culture from the right hip shows no growth thus far.     Review of Systems:    Constitutional: no fever, chills and night sweats. No appetite change or unexpected weight change.  Fatigue and generalized weakness.  Eyes: no eye drainage, itching or redness.  HEENT: no mouth sores, dysphagia or nose bleed.  Respiratory: no for shortness of breath, cough or production of sputum.  Cardiovascular: no chest pain, no palpitations, no orthopnea.   Gastrointestinal: no nausea, vomiting. No diarrhea. No abdominal pain, hematemesis or rectal bleeding.  Genitourinary: no dysuria or polyuria.  Hematologic/lymphatic: no lymph node abnormalities, no easy bruising or easy bleeding.  Musculoskeletal:  Right hip pain.  Skin: No rash and no itching.  Neurological: no loss of consciousness, no seizure, no headache.  Chronic right upper extremity weakness.  Behavioral/Psych: no depression or suicidal ideation.  Endocrine: no hot flashes.  Immunologic: negative.      ----------------------------------------------------------------------------------------------------------------------      Objective       [facility] Meds:  amLODIPine, 10 mg, Oral, Daily  ertapenem, 1 g, Intravenous, Q24H  ferrous sulfate, 325 mg, Oral, Daily With Breakfast  heparin (porcine), 5,000 Units, Subcutaneous, Q8H  ketoconazole, ,  Topical, Daily  metoprolol tartrate, 25 mg, Oral, BID  polyethylene glycol, 17 g, Oral, Daily  prenatal vitamin 27-0.8, 1 tablet, Oral, Daily  sodium chloride, 10 mL, Intravenous, Q12H  sodium chloride, 10 mL, Intravenous, Q12H  sodium chloride, 10 mL, Intravenous, Q12H  tamsulosin, 0.4 mg, Oral, Daily  vancomycin, 125 mg, Oral, Q6H  vitamin D3, 5,000 Units, Oral, Daily      Pharmacy Consult,       ----------------------------------------------------------------------------------------------------------------------    Vital Signs:  Temp:  [97.6 °F (36.4 °C)-99.1 °F (37.3 °C)] 97.9 °F (36.6 °C)  Heart Rate:  [62-74] 74  Resp:  [16-18] 16  BP: (109-139)/(53-63) 139/63  Mean Arterial Pressure (Non-Invasive) for the past 24 hrs (Last 3 readings):   Noninvasive MAP (mmHg)   10/16/22 0600 87     SpO2 Percentage    10/15/22 1833 10/16/22 0258 10/16/22 0600   SpO2: 94% 97% 98%     SpO2:  [94 %-98 %] 98 %  on  Flow (L/min):  [2] 2;   Device (Oxygen Therapy): nasal cannula    Body mass index is 42.94 kg/m².  Wt Readings from Last 3 Encounters:   10/07/22 110 kg (242 lb 6.4 oz)   10/06/22 109 kg (240 lb)   06/17/21 118 kg (260 lb)        Intake/Output Summary (Last 24 hours) at 10/16/2022 1136  Last data filed at 10/16/2022 0800  Gross per 24 hour   Intake 3015 ml   Output 1830 ml   Net 1185 ml     Diet Regular  ----------------------------------------------------------------------------------------------------------------------      Physical Exam:    Constitutional:  male is sitting up in bed on room air in no apparent distress.   HENT:  Head: Normocephalic and atraumatic.  Mouth:  Moist mucous membranes.  Poor dentition.  Eyes:  Conjunctivae and EOM are normal.  No scleral icterus.  Neck:  Neck supple.  No JVD present.    Cardiovascular:  Normal rate, regular rhythm and normal heart sounds with no murmur. No edema.  Pulmonary/Chest:  No respiratory distress, no wheezes, no crackles, with normal breath sounds and  good air movement.  Abdominal:  Soft.  Bowel sounds are normal.  No distension and no tenderness. Large hernia of the left sided abdomen.  Chronic indwelling Martin catheter with clear yellow urine in collection bag.  Musculoskeletal:  No tenderness and no deformity.  No swelling or redness of joints.  1+ edema of bilateral lower extremities with venous stasis changes.  Right hip pain with movement, improving.  Hemovac drain in place with bloody drainage.   Neurological:  Alert and oriented to person, place, and time.  No facial droop.  No slurred speech.   Right upper extremity chronic weakness.  Skin:  Skin is warm and dry.  No rash noted.  No pallor.  Venous stasis changes of bilateral lower extremities.  Psychiatric:  Normal mood and affect.  Behavior is normal.       ----------------------------------------------------------------------------------------------------------------------            Results from last 7 days   Lab Units 10/16/22  1047 10/15/22  0454 10/14/22  0450 10/13/22  0930 10/13/22  0118 10/12/22  0056 10/11/22  0210 10/10/22  1103   CRP mg/dL 16.29*  --   --  19.13*  --   --  18.80*  --    LACTATE mmol/L  --   --   --  0.8  --   --   --   --    WBC 10*3/mm3  --  12.74* 17.52*  --  23.92*   < > 10.83*  --    HEMOGLOBIN g/dL  --  12.1* 12.4*  --  13.9   < > 12.4*  --    HEMATOCRIT %  --  38.0 38.3  --  41.8   < > 37.6  --    MCV fL  --  92.9 90.3  --  90.3   < > 89.3  --    MCHC g/dL  --  31.8 32.4  --  33.3   < > 33.0  --    PLATELETS 10*3/mm3  --  321 319  --  313   < > 239  --    INR   --   --   --   --   --   --   --  1.04    < > = values in this interval not displayed.     Results from last 7 days   Lab Units 10/15/22  0454 10/14/22  0450 10/13/22  0118   SODIUM mmol/L 132* 137 132*   POTASSIUM mmol/L 4.5 4.1 5.1   CHLORIDE mmol/L 102 103 98   CO2 mmol/L 20.0* 23.2 20.1*   BUN mg/dL 27* 30* 25*   CREATININE mg/dL 1.16 1.30* 1.39*   CALCIUM mg/dL 8.5* 8.9 9.1   GLUCOSE mg/dL 115* 100* 116*    Estimated Creatinine Clearance: 63.6 mL/min (by C-G formula based on SCr of 1.16 mg/dL).  No results found for: AMMONIA    Hemoglobin A1C   Date/Time Value Ref Range Status   10/15/2022 0454 5.60 4.80 - 5.60 % Final     Lab Results   Component Value Date    HGBA1C 5.60 10/15/2022     No results found for: TSH, FREET4    Blood Culture   Date Value Ref Range Status   10/09/2022 Abnormal Stain (C)  Preliminary   10/09/2022 Abnormal Stain (C)  Preliminary   10/07/2022 Klebsiella pneumoniae ESBL (C)  Final     Comment:       Consider infectious disease consult.  Susceptibility results may not correlate to clinical outcomes.  For ESBL-producing infections in the blood, a carbapenem is recommended as first-line therapy for optimal clinical outcomes.   10/07/2022 Klebsiella pneumoniae ESBL (C)  Final     Comment:       Consider infectious disease consult.  Susceptibility results may not correlate to clinical outcomes.  For ESBL-producing infections in the blood, a carbapenem is recommended as first-line therapy for optimal clinical outcomes.     Urine Culture   Date Value Ref Range Status   10/07/2022 >100,000 CFU/mL Klebsiella pneumoniae ESBL (A)  Final     Comment:       Consider infectious disease consult.  Susceptibility results may not correlate to clinical outcomes.     No results found for: WOUNDCX  No results found for: STOOLCX  No results found for: RESPCX  Pain Management Panel    There is no flowsheet data to display.           ----------------------------------------------------------------------------------------------------------------------  Imaging Results (Last 24 Hours)     ** No results found for the last 24 hours. **          --------------------------------------------------------------------------------------    Pertinent Infectious Disease Results     Urinalysis on 10/7/2022 was positive with 31-50 WBCs and 4+ bacteria.  Urine culture finalized as greater than 100,000 colonies of ESBL E. coli.   Chest x-ray on 10/7/2022 showed cardiomegaly.  Coarsened interstitial markings noted throughout the lungs.  X-ray of the right hip on 10/6/2022 showed very advanced right hip osteoarthritic change and possibly chronic AVN with deformity of the femoral head contour.  CT pelvis on 10/6/2022 showed there is a large anterior abdominal wall ventral hernia containing nondilated large and small bowel.  Again there is marked deformity of the right humeral head likely related to chronic AVN or advanced osteoarthritis.  No fracture line is evident.  MRI of the right hip on 10/7/2022 showed again noted are fairly advanced changes of osteoarthritic change and joint space collapse of the right hip.  Subchondral cystic change of both the femoral head and acetabulum are noted.  While no characteristic findings of AVN are noted this may represent sequelae of chronic AVN and subcortical collapse.  Small right hip joint effusion that could be reactive secondary to advanced osteoarthritic change.  In setting of fever and hip pain chronic septic arthritis could also be considered but the morphologic alteration of the joint do not appear to be acute.  COVID-19 and flu A/B PCR on 10/7/2022 was negative.  Blood cultures on 10/7/2022 finalized as ESBL Klebsiella pneumoniae.         Assessment/Plan         ASSESSMENT:    Sepsis present on admission  ESBL Klebsiella pneumoniae bacteremia  ESBL Klebsiella pneumoniae complicated UTI      PLAN:      Patient resting in bed this morning.  Reports continued right hip pain.  Continues on 2 L per nasal cannula with no apparent distress.  WBC improved at 12.74.  CRP improved at 16.29.  Wound culture from the right hip shows no growth thus far.     Based on finalization of previous right hip wound culture results recommend to continue Invanz.  Recommend to continue p.o. vancomycin for treatment of C. difficile due to improvement of diarrhea and C. difficile toxin PCR was positive although antigen was  negative.    Code Status:   Code Status and Medical Interventions:   Ordered at: 10/07/22 1416     Code Status (Patient has no pulse and is not breathing):    CPR (Attempt to Resuscitate)     Medical Interventions (Patient has pulse or is breathing):    Full Support       KANDICE Ordoñez  10/16/22  11:36 EDT    Electronically signed by KANDICE Ordoñez, 10/16/22, 11:39 AM EDT.

## 2022-10-17 LAB
BACTERIA SPEC AEROBE CULT: NORMAL
BACTERIA SPEC AEROBE CULT: NORMAL
GRAM STN SPEC: NORMAL
GRAM STN SPEC: NORMAL

## 2022-10-17 PROCEDURE — 99232 SBSQ HOSP IP/OBS MODERATE 35: CPT | Performed by: INTERNAL MEDICINE

## 2022-10-17 PROCEDURE — 25010000002 HEPARIN (PORCINE) PER 1000 UNITS: Performed by: ORTHOPAEDIC SURGERY

## 2022-10-17 PROCEDURE — 25010000002 ERTAPENEM PER 500 MG: Performed by: ORTHOPAEDIC SURGERY

## 2022-10-17 RX ADMIN — TAMSULOSIN HYDROCHLORIDE 0.4 MG: 0.4 CAPSULE ORAL at 09:14

## 2022-10-17 RX ADMIN — HYDROCODONE BITARTRATE AND ACETAMINOPHEN 1 TABLET: 7.5; 325 TABLET ORAL at 15:43

## 2022-10-17 RX ADMIN — Medication 10 ML: at 09:15

## 2022-10-17 RX ADMIN — FERROUS SULFATE TAB 325 MG (65 MG ELEMENTAL FE) 325 MG: 325 (65 FE) TAB at 09:14

## 2022-10-17 RX ADMIN — METOPROLOL TARTRATE 25 MG: 25 TABLET, FILM COATED ORAL at 09:14

## 2022-10-17 RX ADMIN — HYDROCODONE BITARTRATE AND ACETAMINOPHEN 1 TABLET: 10; 325 TABLET ORAL at 22:23

## 2022-10-17 RX ADMIN — HEPARIN SODIUM 5000 UNITS: 5000 INJECTION INTRAVENOUS; SUBCUTANEOUS at 22:02

## 2022-10-17 RX ADMIN — VANCOMYCIN HYDROCHLORIDE 125 MG: 125 CAPSULE ORAL at 11:16

## 2022-10-17 RX ADMIN — Medication 10 ML: at 22:03

## 2022-10-17 RX ADMIN — HEPARIN SODIUM 5000 UNITS: 5000 INJECTION INTRAVENOUS; SUBCUTANEOUS at 13:46

## 2022-10-17 RX ADMIN — Medication 5000 UNITS: at 09:14

## 2022-10-17 RX ADMIN — Medication 10 ML: at 22:02

## 2022-10-17 RX ADMIN — VANCOMYCIN HYDROCHLORIDE 125 MG: 125 CAPSULE ORAL at 01:10

## 2022-10-17 RX ADMIN — HEPARIN SODIUM 5000 UNITS: 5000 INJECTION INTRAVENOUS; SUBCUTANEOUS at 06:18

## 2022-10-17 RX ADMIN — AMLODIPINE BESYLATE 10 MG: 10 TABLET ORAL at 09:14

## 2022-10-17 RX ADMIN — METOPROLOL TARTRATE 25 MG: 25 TABLET, FILM COATED ORAL at 22:03

## 2022-10-17 RX ADMIN — KETOCONAZOLE: 20 SHAMPOO, SUSPENSION TOPICAL at 09:14

## 2022-10-17 RX ADMIN — VANCOMYCIN HYDROCHLORIDE 125 MG: 125 CAPSULE ORAL at 17:14

## 2022-10-17 RX ADMIN — ERTAPENEM SODIUM 1 G: 1 INJECTION, POWDER, LYOPHILIZED, FOR SOLUTION INTRAMUSCULAR; INTRAVENOUS at 11:17

## 2022-10-17 RX ADMIN — PRENATAL VIT W/ FE FUMARATE-FA TAB 27-0.8 MG 1 TABLET: 27-0.8 TAB at 09:14

## 2022-10-17 RX ADMIN — HYDROCODONE BITARTRATE AND ACETAMINOPHEN 1 TABLET: 7.5; 325 TABLET ORAL at 09:18

## 2022-10-17 RX ADMIN — VANCOMYCIN HYDROCHLORIDE 125 MG: 125 CAPSULE ORAL at 06:18

## 2022-10-17 NOTE — PROGRESS NOTES
PROGRESS NOTE         Patient Identification:  Name:  Solitario Mcdaniel  Age:  71 y.o.  Sex:  male  :  1950  MRN:  [id number]  Visit Number:  [id number]  Primary Care Provider:  Corrine Mcdaniel APRN         LOS: 10 days       ----------------------------------------------------------------------------------------------------------------------  Subjective       Chief Complaints:    Hip Pain        Interval History:      Patient resting in bed this morning.  Reports significant right hip pain this morning.  Of note patient's Hemovac drain to was laying underneath right hip, repositioned to remove patient off of Hemovac drain tube.  Improved pain after repositioning.  Patient reports 1 bowel movement overnight that was formed.  Afebrile.  Continues on 2 L per nasal cannula with no apparent distress.    Review of Systems:    Constitutional: no fever, chills and night sweats. No appetite change or unexpected weight change.  Fatigue and generalized weakness.  Eyes: no eye drainage, itching or redness.  HEENT: no mouth sores, dysphagia or nose bleed.  Respiratory: no for shortness of breath, cough or production of sputum.  Cardiovascular: no chest pain, no palpitations, no orthopnea.   Gastrointestinal: no nausea, vomiting. No diarrhea. No abdominal pain, hematemesis or rectal bleeding.  Genitourinary: no dysuria or polyuria.  Hematologic/lymphatic: no lymph node abnormalities, no easy bruising or easy bleeding.  Musculoskeletal:  Right hip pain.  Skin: No rash and no itching.  Neurological: no loss of consciousness, no seizure, no headache.  Chronic right upper extremity weakness.  Behavioral/Psych: no depression or suicidal ideation.  Endocrine: no hot flashes.  Immunologic: negative.      ----------------------------------------------------------------------------------------------------------------------      Objective       \[facility]\"" Meds:  amLODIPine, 10 mg, Oral, Daily  ertapenem, 1 g,  Intravenous, Q24H  ferrous sulfate, 325 mg, Oral, Daily With Breakfast  heparin (porcine), 5,000 Units, Subcutaneous, Q8H  ketoconazole, , Topical, Daily  metoprolol tartrate, 25 mg, Oral, BID  polyethylene glycol, 17 g, Oral, Daily  prenatal vitamin 27-0.8, 1 tablet, Oral, Daily  sodium chloride, 10 mL, Intravenous, Q12H  sodium chloride, 10 mL, Intravenous, Q12H  sodium chloride, 10 mL, Intravenous, Q12H  tamsulosin, 0.4 mg, Oral, Daily  vancomycin, 125 mg, Oral, Q6H  vitamin D3, 5,000 Units, Oral, Daily      Pharmacy Consult,       ----------------------------------------------------------------------------------------------------------------------    Vital Signs:  Temp:  [97.8 °F (36.6 °C)-98.4 °F (36.9 °C)] 97.8 °F (36.6 °C)  Heart Rate:  [68-88] 68  Resp:  [16-20] 16  BP: (102-160)/(55-67) 160/63  Mean Arterial Pressure (Non-Invasive) for the past 24 hrs (Last 3 readings):   Noninvasive MAP (mmHg)   10/17/22 0600 107   10/16/22 2304 87   10/16/22 1400 83     SpO2 Percentage    10/16/22 1851 10/17/22 0252 10/17/22 0600   SpO2: 98% 98% 95%     SpO2:  [95 %-98 %] 95 %  on  Flow (L/min):  [2] 2;   Device (Oxygen Therapy): nasal cannula    Body mass index is 42.94 kg/m².  Wt Readings from Last 3 Encounters:   10/07/22 110 kg (242 lb 6.4 oz)   10/06/22 109 kg (240 lb)   06/17/21 118 kg (260 lb)        Intake/Output Summary (Last 24 hours) at 10/17/2022 1009  Last data filed at 10/17/2022 0700  Gross per 24 hour   Intake 1020 ml   Output 710 ml   Net 310 ml     Diet Regular  ----------------------------------------------------------------------------------------------------------------------      Physical Exam:    Constitutional:  male lying in bed on room air with no apparent distress.  HENT:  Head: Normocephalic and atraumatic.  Mouth:  Moist mucous membranes.  Poor dentition.  Eyes:  Conjunctivae and EOM are normal.  No scleral icterus.  Neck:  Neck supple.  No JVD present.    Cardiovascular:  Normal  rate, regular rhythm and normal heart sounds with no murmur. No edema.  Pulmonary/Chest:  No respiratory distress, no wheezes, no crackles, with normal breath sounds and good air movement.  Abdominal:  Soft.  Bowel sounds are normal.  No distension and no tenderness. Large hernia of the left sided abdomen.  Chronic indwelling Martin catheter with clear yellow urine in collection bag.  Musculoskeletal:  No tenderness and no deformity.  No swelling or redness of joints.  1+ edema of bilateral lower extremities with venous stasis changes.  Right hip pain with movement, improving.  Hemovac drain in place with serosanguineous drainage.  Neurological:  Alert and oriented to person, place, and time.  No facial droop.  No slurred speech.   Right upper extremity chronic weakness.  Skin:  Skin is warm and dry.  No rash noted.  No pallor.  Venous stasis changes of bilateral lower extremities.  Psychiatric:  Normal mood and affect.  Behavior is normal.       ----------------------------------------------------------------------------------------------------------------------            Results from last 7 days   Lab Units 10/16/22  1047 10/15/22  0454 10/14/22  0450 10/13/22  0930 10/13/22  0118 10/12/22  0056 10/11/22  0210 10/10/22  1103   CRP mg/dL 16.29*  --   --  19.13*  --   --  18.80*  --    LACTATE mmol/L  --   --   --  0.8  --   --   --   --    WBC 10*3/mm3  --  12.74* 17.52*  --  23.92*   < > 10.83*  --    HEMOGLOBIN g/dL  --  12.1* 12.4*  --  13.9   < > 12.4*  --    HEMATOCRIT %  --  38.0 38.3  --  41.8   < > 37.6  --    MCV fL  --  92.9 90.3  --  90.3   < > 89.3  --    MCHC g/dL  --  31.8 32.4  --  33.3   < > 33.0  --    PLATELETS 10*3/mm3  --  321 319  --  313   < > 239  --    INR   --   --   --   --   --   --   --  1.04    < > = values in this interval not displayed.     Results from last 7 days   Lab Units 10/15/22  0454 10/14/22  0450 10/13/22  0118   SODIUM mmol/L 132* 137 132*   POTASSIUM mmol/L 4.5 4.1 5.1    CHLORIDE mmol/L 102 103 98   CO2 mmol/L 20.0* 23.2 20.1*   BUN mg/dL 27* 30* 25*   CREATININE mg/dL 1.16 1.30* 1.39*   CALCIUM mg/dL 8.5* 8.9 9.1   GLUCOSE mg/dL 115* 100* 116*   Estimated Creatinine Clearance: 63.6 mL/min (by C-G formula based on SCr of 1.16 mg/dL).  No results found for: AMMONIA    Hemoglobin A1C   Date/Time Value Ref Range Status   10/15/2022 0454 5.60 4.80 - 5.60 % Final     Lab Results   Component Value Date    HGBA1C 5.60 10/15/2022     No results found for: TSH, FREET4    Blood Culture   Date Value Ref Range Status   10/09/2022 Abnormal Stain (C)  Preliminary   10/09/2022 Abnormal Stain (C)  Preliminary   10/07/2022 Klebsiella pneumoniae ESBL (C)  Final     Comment:       Consider infectious disease consult.  Susceptibility results may not correlate to clinical outcomes.  For ESBL-producing infections in the blood, a carbapenem is recommended as first-line therapy for optimal clinical outcomes.   10/07/2022 Klebsiella pneumoniae ESBL (C)  Final     Comment:       Consider infectious disease consult.  Susceptibility results may not correlate to clinical outcomes.  For ESBL-producing infections in the blood, a carbapenem is recommended as first-line therapy for optimal clinical outcomes.     Urine Culture   Date Value Ref Range Status   10/07/2022 >100,000 CFU/mL Klebsiella pneumoniae ESBL (A)  Final     Comment:       Consider infectious disease consult.  Susceptibility results may not correlate to clinical outcomes.     No results found for: WOUNDCX  No results found for: STOOLCX  No results found for: RESPCX  Pain Management Panel    There is no flowsheet data to display.           ----------------------------------------------------------------------------------------------------------------------  Imaging Results (Last 24 Hours)     ** No results found for the last 24 hours. **          --------------------------------------------------------------------------------------    Pertinent  Infectious Disease Results     Urinalysis on 10/7/2022 was positive with 31-50 WBCs and 4+ bacteria.  Urine culture finalized as greater than 100,000 colonies of ESBL E. coli.  Chest x-ray on 10/7/2022 showed cardiomegaly.  Coarsened interstitial markings noted throughout the lungs.  X-ray of the right hip on 10/6/2022 showed very advanced right hip osteoarthritic change and possibly chronic AVN with deformity of the femoral head contour.  CT pelvis on 10/6/2022 showed there is a large anterior abdominal wall ventral hernia containing nondilated large and small bowel.  Again there is marked deformity of the right humeral head likely related to chronic AVN or advanced osteoarthritis.  No fracture line is evident.  MRI of the right hip on 10/7/2022 showed again noted are fairly advanced changes of osteoarthritic change and joint space collapse of the right hip.  Subchondral cystic change of both the femoral head and acetabulum are noted.  While no characteristic findings of AVN are noted this may represent sequelae of chronic AVN and subcortical collapse.  Small right hip joint effusion that could be reactive secondary to advanced osteoarthritic change.  In setting of fever and hip pain chronic septic arthritis could also be considered but the morphologic alteration of the joint do not appear to be acute.  COVID-19 and flu A/B PCR on 10/7/2022 was negative.  Blood cultures on 10/7/2022 finalized as ESBL Klebsiella pneumoniae.  C. difficile toxin PCR was positive although antigen was negative.    Patient resting in bed this morning.  Reports significant right hip pain this morning.  Of note patient's Hemovac drain to was laying underneath right hip, repositioned to remove patient off of Hemovac drain tube.  Improved pain after repositioning.  Patient reports 1 bowel movement overnight that was formed.  Afebrile.  Continues on 2 L per nasal cannula with no apparent distress.  Patient continues on Invanz and p.o.  vancomycin.    Assessment/Plan         ASSESSMENT:    Sepsis present on admission  ESBL Klebsiella pneumoniae bacteremia  ESBL Klebsiella pneumoniae complicated UTI      PLAN:    I saw and examined the patient myself this morning and discussed the plan of care with KANDICE Ordoñez primary RN and here are my findings:    Patient is resting in bed but continues to have significant right hip pain and unfortunately his Hemovac was placed right under his right buttock which when taking out relieve some of his pain and patient was repositioned.  Improved pain after repositioning.  He reported only 1 bowel movement overnight that was formed with no evidence of further diarrhea.  No fever reported.  Continues on 2 L nasal cannula with lungs clear to auscultation without any crackles wheezing or rhonchi and the right hip is slightly erythematous but most likely related to postop changes.    For now would recommend to continue with ertapenem and oral vancomycin with intraoperative cultures finalized as no growth and no organisms.    Code Status:   Code Status and Medical Interventions:   Ordered at: 10/07/22 1416     Code Status (Patient has no pulse and is not breathing):    CPR (Attempt to Resuscitate)     Medical Interventions (Patient has pulse or is breathing):    Full Support       KANDICE Ordoñez  10/17/22  10:09 EDT    Electronically signed by KANDICE Ordoñez, 10/17/22, 10:10 AM EDT.    Electronically signed by Olena Ramsay MD, 10/17/22, 1:33 PM EDT.

## 2022-10-17 NOTE — PLAN OF CARE
Goal Outcome Evaluation:  Plan of Care Reviewed With: patient           Outcome Evaluation: Pt resting in bed. Pt continues to complain of pain, PRN meds given. Dressimg changed. Will continue plan of care.

## 2022-10-17 NOTE — PROGRESS NOTES
"    [facility] HOSPITALIST PROGRESS NOTE     Patient Identification:  Name:  Solitario Mcdaniel  Age:  71 y.o.  Sex:  male  :  1950  MRN:  [id number]  Visit Number:  [id number]  Primary Care Provider:  Corrine Mcdaniel APRN    Length of stay:  10    Chief complaint: Intermittent right hip pain    Subjective:    Patient reports his right hip pain is much improved today compared to yesterday.  However, he still has intermittent \"twinges\" of pain when repositioning himself in bed.  He denies any fevers, chills, sweats, rigors, nausea or vomiting.  No new events overnight.  ----------------------------------------------------------------------------------------------------------------------  Current [facility] Meds:  amLODIPine, 10 mg, Oral, Daily  ertapenem, 1 g, Intravenous, Q24H  ferrous sulfate, 325 mg, Oral, Daily With Breakfast  heparin (porcine), 5,000 Units, Subcutaneous, Q8H  ketoconazole, , Topical, Daily  metoprolol tartrate, 25 mg, Oral, BID  polyethylene glycol, 17 g, Oral, Daily  prenatal vitamin 27-0.8, 1 tablet, Oral, Daily  sodium chloride, 10 mL, Intravenous, Q12H  sodium chloride, 10 mL, Intravenous, Q12H  sodium chloride, 10 mL, Intravenous, Q12H  tamsulosin, 0.4 mg, Oral, Daily  vancomycin, 125 mg, Oral, Q6H  vitamin D3, 5,000 Units, Oral, Daily      Pharmacy Consult,       ----------------------------------------------------------------------------------------------------------------------  Vital Signs:  Temp:  [97.8 °F (36.6 °C)-98.4 °F (36.9 °C)] 97.8 °F (36.6 °C)  Heart Rate:  [68-88] 68  Resp:  [16-20] 16  BP: (102-160)/(55-67) 160/63      10/07/22  1008 10/07/22  1622   Weight: 109 kg (240 lb 4.8 oz) 110 kg (242 lb 6.4 oz)     Body mass index is 42.94 kg/m².    Intake/Output Summary (Last 24 hours) at 10/17/2022 1449  Last data filed at 10/17/2022 1300  Gross per 24 hour   Intake 1020 ml   Output 710 ml   Net 310 ml     Diet " Regular  ----------------------------------------------------------------------------------------------------------------------  Physical exam:  Constitutional: Well-nourished  male in no apparent distress.     HENT:  Head:  Normocephalic and atraumatic.  Mouth:  Moist mucous membranes.    Eyes:  Conjunctivae and EOM are normal.  Pupils are equal, round, and reactive to light.  No scleral icterus.    Neck:  Neck supple. No thyromegaly.  No JVD present.    Cardiovascular:  Regular rate and rhythm with no murmurs, rubs, clicks or gallops appreciated.  Pulmonary/Chest:  Clear to auscultation bilaterally with no crackles, wheezes or rhonchi appreciated.  Abdominal:  Soft. Nontender. Nondistended  Bowel sounds are normal in all four quadrants. No organomegally appreciated.   Musculoskeletal:  No edema, mild tenderness over right hip joint where surgical incision is located, no deformity.  No red or swollen joints anywhere.    Neurological:  Alert, Cranial nerves II-XII intact with no focal deficits.  No facial droop.  No slurred speech.   Skin:  Warm and dry to palpation with no rashes or lesions appreciated.  Peripheral vascular:  2+ radial and pedal pulses in bilateral upper and lower extremities.  Psychiatric:  Alert and oriented x3, demonstrates appropriate judgment and insight.  -------------------------------------------------------------------------------   ----------------------------------------------------------------------------------------------------------------------      Results from last 7 days   Lab Units 10/16/22  1047 10/15/22  0454 10/14/22  0450 10/13/22  0930 10/13/22  0118 10/12/22  0056 10/11/22  0210   CRP mg/dL 16.29*  --   --  19.13*  --   --  18.80*   LACTATE mmol/L  --   --   --  0.8  --   --   --    WBC 10*3/mm3  --  12.74* 17.52*  --  23.92*   < > 10.83*   HEMOGLOBIN g/dL  --  12.1* 12.4*  --  13.9   < > 12.4*   HEMATOCRIT %  --  38.0 38.3  --  41.8   < > 37.6   MCV fL  --  92.9  90.3  --  90.3   < > 89.3   MCHC g/dL  --  31.8 32.4  --  33.3   < > 33.0   PLATELETS 10*3/mm3  --  321 319  --  313   < > 239    < > = values in this interval not displayed.         Results from last 7 days   Lab Units 10/15/22  0454 10/14/22  0450 10/13/22  0118   SODIUM mmol/L 132* 137 132*   POTASSIUM mmol/L 4.5 4.1 5.1   CHLORIDE mmol/L 102 103 98   CO2 mmol/L 20.0* 23.2 20.1*   BUN mg/dL 27* 30* 25*   CREATININE mg/dL 1.16 1.30* 1.39*   CALCIUM mg/dL 8.5* 8.9 9.1   GLUCOSE mg/dL 115* 100* 116*   Estimated Creatinine Clearance: 63.6 mL/min (by C-G formula based on SCr of 1.16 mg/dL).    No results found for: AMMONIA      Blood Culture   Date Value Ref Range Status   10/12/2022 No growth at 5 days  Final     No results found for: URINECX  Wound Culture   Date Value Ref Range Status   10/14/2022 No growth at 3 days  Final     No results found for: STOOLCX    I have personally looked at the labs and they are summarized above.  ----------------------------------------------------------------------------------------------------------------------  Imaging Results (Last 24 Hours)     ** No results found for the last 24 hours. **        ----------------------------------------------------------------------------------------------------------------------  Assessment and Plan:    1.  Sepsis - present on admission, secondary to Klebsiella pneumonia as well as Klebsiella pneumonia a bacteremia.  Now resolved.    2.  ESBL Klebsiella bacteremia -continue Invanz per infectious disease recommendations.    3.  ESBL Klebsiella acute cystitis -continue Invanz per infectious disease recommendations.    4.  Septic arthritis of right hip -status post joint aspiration, culture with ESBL Klebsiella.  Continue Invanz for now.    5.  C. difficile colitis -continue oral vancomycin for now, patient now reporting normal and formed bowel movements today    6.  Essential hypertension -well-controlled, continue to monitor closely and make  medication adjustments as necessary    Disposition currently pending rehab placement    Hans Avilez, DO   10/17/22   14:49 EDT

## 2022-10-17 NOTE — PLAN OF CARE
Goal Outcome Evaluation:  Plan of Care Reviewed With: patient        Progress: no change  Outcome Evaluation: Pt resting in bed. Vital signs stable. Pt C/O pain, PRN med given. Interventions effective per pt. No acute changes. Will continue plan of care.

## 2022-10-17 NOTE — PROGRESS NOTES
Inpatient Progress Note  Solitario Mcdaniel  Date: 10/17/22  MRN: [id number]      Subjective:   Patient is postop day #3 status post right hip irrigation and debridement.  Patient is on IV antibiotics.  Infectious disease is following the patient.  Hemovac is in place.  Nursing staff noted that there was 30 cc of drainage from the Hemovac this morning.  The patient is afebrile on exam.  Pain is controlled.  He has had limited mobilization with physical therapy postoperatively.  The patient notes no other arthralgia on exam today.        Objective:    Vitals:    10/16/22 1851 10/16/22 2304 10/17/22 0252 10/17/22 0600   BP: 135/56 135/67 102/55 160/63   BP Location: Right arm Right arm Right arm Right arm   Patient Position: Lying Lying Lying Lying   Pulse: 71 88 69 68   Resp: 20 16 16   Temp: 98.4 °F (36.9 °C)  98.4 °F (36.9 °C) 97.8 °F (36.6 °C)   TempSrc: Oral  Oral Oral   SpO2: 98%  98% 95%   Weight:       Height:              Physical Exam:  Constitutional: Chronically ill male.  No respiratory distress.        Musculoskeletal: Dressing in place to right hip.  Dressing is clean dry and intact.  Hemovac in place.  Minimal bloody drainage noted.  Neurovascularly intact right lower extremity.  Dorsiflexion plantarflexion intact right ankle.  Positive pedal pulses noted.      Labs:    Results from last 7 days   Lab Units 10/16/22  1047 10/15/22  0454 10/14/22  0450 10/13/22  0930 10/13/22  0118 10/12/22  0056 10/11/22  0210 10/10/22  1103   CRP mg/dL 16.29*  --   --  19.13*  --   --  18.80*  --    LACTATE mmol/L  --   --   --  0.8  --   --   --   --    WBC 10*3/mm3  --  12.74* 17.52*  --  23.92*   < > 10.83*  --    HEMOGLOBIN g/dL  --  12.1* 12.4*  --  13.9   < > 12.4*  --    HEMATOCRIT %  --  38.0 38.3  --  41.8   < > 37.6  --    MCV fL  --  92.9 90.3  --  90.3   < > 89.3  --    MCHC g/dL  --  31.8 32.4  --  33.3   < > 33.0  --    PLATELETS 10*3/mm3  --  321 319  --  313   < > 239  --    INR   --   --   --   --   --    --   --  1.04    < > = values in this interval not displayed.         Results from last 7 days   Lab Units 10/15/22  0454 10/14/22  0450 10/13/22  0118   SODIUM mmol/L 132* 137 132*   POTASSIUM mmol/L 4.5 4.1 5.1   CHLORIDE mmol/L 102 103 98   CO2 mmol/L 20.0* 23.2 20.1*   BUN mg/dL 27* 30* 25*   CREATININE mg/dL 1.16 1.30* 1.39*   CALCIUM mg/dL 8.5* 8.9 9.1   GLUCOSE mg/dL 115* 100* 116*   Estimated Creatinine Clearance: 63.6 mL/min (by C-G formula based on SCr of 1.16 mg/dL).  No results found for: AMMONIA          Hemoglobin A1C   Date Value Ref Range Status   10/15/2022 5.60 4.80 - 5.60 % Final     No results found for: TSH, FREET4      Pain Management Panel    There is no flowsheet data to display.         Blood Culture   Date Value Ref Range Status   10/12/2022 No growth at 4 days  Preliminary     No results found for: URINECX  Wound Culture   Date Value Ref Range Status   10/14/2022 No growth at 3 days  Final     No results found for: STOOLCX              Radiology:  Imaging Results (Last 72 Hours)     ** No results found for the last 72 hours. **            Assessment:   #1 status post right hip irrigation debridement          Plan:   Continue IV antibiotics per infectious disease recommendations.  Weight-bear as tolerated right lower extremity.  Leave drain in place today.  We will anticipate removing drain in the morning.  CRP is trending down.  Patient is on heparin/SCUDs for DVT prophylaxis.  Orthopedic surgery continue to follow.  Continue to trend CRP.        Link Fowler, APRN October 17, 2022 09:58 EDT

## 2022-10-17 NOTE — CASE MANAGEMENT/SOCIAL WORK
Discharge Planning Assessment  [facility]     Patient Name: Solitario Mcdaniel  MRN: [id number]  Today's Date: 10/17/2022    Admit Date: 10/7/2022     Discharge Plan     Row Name 10/17/22 1024       Plan    Plan SS discussed pt with [facility] per Amanda Steven and they are unable to accept pt with a spend down. SS sent referral to [facility] and notified Christy. SS to follow.              Continued Care and Services - Admitted Since 10/7/2022     Destination     Service Provider Request Status Selected Services Address Phone Fax Patient Preferred    [facility] Pending - Request Sent N/A [address] [address] [phone] --    [facility] - [place] Declined  Medicare Part A only N/A [address] [facility] [place] [id number] [phone] --              Expected Discharge Date and Time     Expected Discharge Date Expected Discharge Time    Oct 17, 2022         TYSON Benitez

## 2022-10-17 NOTE — DISCHARGE PLACEMENT REQUEST
"Ana Valle (71 y.o. Male)     Date of Birth   1950    Social Security Number       Address   [address] [place] [address]    Home Phone   [phone]    MRN   [id number]       Episcopal   Unknown    Marital Status                               Admission Date   10/7/22    Admission Type   Emergency    Admitting Provider   Laurence Pelayo MD    Attending Provider   Hans Avilez DO    Department, Room/Bed   [address], 3338/1P       Discharge Date       Discharge Disposition       Discharge Destination                               Attending Provider: Hans Avilez DO    Allergies: No Known Allergies    Isolation: Contact, Spore   Infection: ESBL (10/08/22), MDRO (10/09/22), ESBL Klebsiella (10/09/22), C.difficile (10/14/22)   Code Status: CPR    Ht: 160 cm (63\")   Wt: 110 kg (242 lb 6.4 oz)    Admission Cmt: None   Principal Problem: None                Active Insurance as of 10/7/2022     Primary Coverage     Payor Plan Insurance Group Employer/Plan Group    MEDICARE MEDICARE A ONLY      Payor Plan Address Payor Plan Phone Number Payor Plan Fax Number Effective Dates    [address] [phone]  12/1/2014 - None Entered    [place] [address]       Subscriber Name Subscriber Birth Date Member ID       ANA VALLE 1950 [id number]                 Emergency Contacts      (Rel.) Home Phone Work Phone Mobile Phone    DAVID ROBLES (Daughter) [phone] -- [phone]               History & Physical      Tania Campos APRN at 10/07/22 1618     Attestation signed by Laurence Pelayo MD at 10/07/22 1910    I have reviewed this documentation and agree.  Patient seen and examined reviewed his condition discussed the case with Tania NP, agree with the note below  The patient is complaining of significant right hip pain causing him multiple falls and severe ambulatory dysfunction  He is known with history of BPH that requires " chronic Jorge, lastly was changed on Monday which seems to be traumatic    Examination revealed limited mot range of motion of the right hip   No acute cardiopulmonary findings    --Ceftriaxone 2 g every 24 hours for complicated UTI  --Sepsis treatment, continue with 16-hour  --Restart home meds once supply in the system  --Ortho consult for right hip severe osteoarthritis/avascular necrosis, patient with benefit from hip arthroplasty  --Other management as outlined below                  [facility]  History & Physical    Patient Identification:  Name:  Solitario Mcdaniel  Age:  71 y.o.  Sex:  male  :  1950  MRN:  [id number]   Visit Number:  [id number]  Admit Date: 10/7/2022   Primary Care Physician:  Provider, No Known    Subjective     Chief complaint: Right Hip Pain    History of presenting illness:      Solitario Mcdaniel is a 71 y.o. male with past medical history significant for arthritis, essential hypertension, and BPH.  Patient presents to [facility] emergency department today for further evaluation of right hip pain s/p mechanical fall approximately 2 days ago. He states that he slid out of his recliner. Initially after fall, patient did not experience pain. However, several hours after the fall, patient states pain became severe. Also reports muscle spasms. He normally ambulates using rolling walker. States that he is now completely unable to ambulate at this time. He states that he had talked over the phone with Dr. Alberto (orthopedic surgery) who recommended presentation to the ED for further evaluation. Externally, right hip does not appear edematous, ecchymotic, or erythematous. Patient does have pain with any small movement. He currently denies chest pain, palpitations, shortness of breath, fever, chills, cough, abdominal pain, n/v/d, and constipation. He has chronic indwelling jorge catheter (states that it was replaced yesterday). Denies symptoms  of dysuria or flank pain. Mild to moderate non pitting edema noted to bilateral lower extremities with what appears to be venous stasis dermatitis. States that edema currently appears at baseline. He is stable on room air, denies use of supplemental O2 at home. States that he currently lives with his daughter and son-in-law. Reports compliance with home medication regimen. Discussed plan with patient; voices agreement with no further questions or concerns at this time.     Upon arrival to the ED, vital signs were temperature 100.5, pulse 83, respirations 16, blood pressure 158/76, SPO2 saturation 97% on  room air.  CMP with glucose 134, AST 61, otherwise unremarkable.  C-reactive protein 21.28.  Lactate not elevated.  Procalcitonin 4.29.  CBC with WBCs 15.83, otherwise unremarkable.  Sed rate 71.  Peripheral blood cultures x2 pending.  COVID-19 and flu A/B swab negative.  CT of the pelvis without contrast revealing large anterior abdominal wall ventral hernia containing nondilated large and small bowel; marked deformity of the right humeral head likely related to chronic AVN or advanced osteoarthritis; no fracture line is evident.  Chest x-ray revealed cardiomegaly, otherwise unremarkable.  MRI of the right hip with and without contrast revealed fairly advanced changes of osteoarthritic disease and joint space collapse of the right hip; subchondral cystic change of both the femoral head and acetabulum are noted.  No characteristic findings of AVN are noted, however may represent sequelae of chronic AVN and subcortical collapse.  Small right hip joint effusion, could be reactive secondary to advanced osteoarthritic change.  In setting of fever and hip pain chronic septic arthritis could also be considered but the morphologic alteration of the joint did not appear to be acute.    Known Emergency Department medications received prior to my evaluation included 4 mg IV morphine, 4 mg IV Zofran, and 2000 mg IV vancomycin.  Room location at the time of my evaluation was 338.  Discussed with attending physician, Laurence Mckinley MD.    ---------------------------------------------------------------------------------------------------------------------   Review of Systems   Constitutional: Positive for activity change and fatigue. Negative for chills and fever.   HENT: Negative for congestion and trouble swallowing.    Respiratory: Negative for cough, shortness of breath and wheezing.    Cardiovascular: Positive for leg swelling (chronic). Negative for chest pain and palpitations.   Gastrointestinal: Negative for abdominal pain, constipation, diarrhea, nausea and vomiting.   Musculoskeletal: Positive for arthralgias and gait problem.   Neurological: Positive for weakness (chronic, RUE). Negative for dizziness, tremors, seizures, syncope, facial asymmetry, speech difficulty, light-headedness, numbness and headaches.     ---------------------------------------------------------------------------------------------------------------------   No past medical history on file.  No past surgical history on file.  No family history on file.  Social History     Socioeconomic History   • Marital status:      ---------------------------------------------------------------------------------------------------------------------   Allergies:  Patient has no known allergies.  ---------------------------------------------------------------------------------------------------------------------   Home medications:    Medications below are reported home medications pulling from within the system; at this time, these medications have not been reconciled unless otherwise specified and are in the verification process for further verifcation as current home medications.  Medications Prior to Admission   Medication Sig Dispense Refill Last Dose   • HYDROcodone-acetaminophen (NORCO) 5-325 MG per tablet Take 1 tablet by mouth Every 8 (Eight) Hours As  Needed for Moderate Pain. 10 tablet 0        Hospital Scheduled Meds:  heparin (porcine), 5,000 Units, Subcutaneous, Q8H  piperacillin-tazobactam, 3.375 g, Intravenous, Once  piperacillin-tazobactam, 3.375 g, Intravenous, Q8H  sodium chloride, 10 mL, Intravenous, Q12H      sodium chloride, 50 mL/hr        Current listed hospital scheduled medications may not yet reflect those currently placed in orders that are signed and held awaiting patient's arrival to floor.   ---------------------------------------------------------------------------------------------------------------------     Objective     Vital Signs:  Temp:  [100.1 °F (37.8 °C)-100.5 °F (38.1 °C)] 100.1 °F (37.8 °C)  Heart Rate:  [83-87] 87  Resp:  [16-18] 18  BP: (151-171)/(70-83) 151/80      10/07/22  1008   Weight: 109 kg (240 lb 4.8 oz)     Body mass index is 42.58 kg/m².  ---------------------------------------------------------------------------------------------------------------------       Physical Exam  Vitals and nursing note reviewed.   Constitutional:       General: He is awake. He is not in acute distress.     Appearance: He is not diaphoretic.      Comments: Currently on room air.   HENT:      Head: Normocephalic and atraumatic.      Mouth/Throat:      Mouth: Mucous membranes are moist.      Pharynx: Oropharynx is clear.   Eyes:      Extraocular Movements: Extraocular movements intact.      Conjunctiva/sclera: Conjunctivae normal.   Cardiovascular:      Rate and Rhythm: Normal rate and regular rhythm.      Pulses: Normal pulses.           Dorsalis pedis pulses are 2+ on the right side and 2+ on the left side.      Heart sounds: Normal heart sounds. No murmur heard.    No friction rub.      Comments: Chronic appearing edema to BLE; patient states appears at baseline. Non pitting.   Pulmonary:      Effort: Pulmonary effort is normal. No accessory muscle usage, respiratory distress or retractions.      Breath sounds: Normal breath sounds. No  wheezing, rhonchi or rales.   Abdominal:      General: Bowel sounds are normal. There is no distension.      Palpations: Abdomen is soft.      Tenderness: There is no abdominal tenderness. There is no guarding.      Hernia: A hernia is present.      Comments: Large hernia observed, lower left/central abdomen.   Genitourinary:     Penis: Normal.       Comments: Chronic indwelling jorge catheter in place; states it was changed out yesterday (10/6).  Musculoskeletal:      Cervical back: Neck supple. No rigidity.      Right lower le+ Edema present.      Left lower le+ Edema present.   Skin:     General: Skin is warm and dry.      Capillary Refill: Capillary refill takes 2 to 3 seconds.      Findings: Erythema (chronic, BLE) present.      Comments: BLE dry/flaky.   Neurological:      Mental Status: He is alert and oriented to person, place, and time.      Cranial Nerves: No facial asymmetry.      Sensory: Sensation is intact.      Motor: Weakness (Chronic, RUE) present. No tremor.      Comments: Weakness to right upper extremity; patient states has been present since he had Covid. Hospitalized at [facility] at that time and patient states stroke was ruled out.    Psychiatric:         Attention and Perception: Attention normal.         Mood and Affect: Mood normal.         Speech: Speech normal.         Behavior: Behavior normal. Behavior is cooperative.         Thought Content: Thought content normal.         Cognition and Memory: Cognition normal.       ---------------------------------------------------------------------------------------------------------------------  EKG:  Appearing normal sinus rhythm without evidence of ischemia. Pending cardiology interpretation.   ---------------------------------------------------------------------------------------------------------------------   Results from last 7 days   Lab Units 10/07/22  1138 10/07/22  1045 10/06/22  0753   CRP mg/dL 21.28*  --   --    LACTATE  mmol/L  --  0.9  --    WBC 10*3/mm3  --  15.83* 13.23*   HEMOGLOBIN g/dL  --  13.2 13.1   HEMATOCRIT %  --  38.5 38.8   MCV fL  --  87.1 88.2   MCHC g/dL  --  34.3 33.8   PLATELETS 10*3/mm3  --  203 179   INR   --   --  1.02         Results from last 7 days   Lab Units 10/07/22  1138 10/06/22  0753   SODIUM mmol/L 141 141   POTASSIUM mmol/L 4.3 4.4   CHLORIDE mmol/L 103 105   CO2 mmol/L 23.3 23.2   BUN mg/dL 23 32*   CREATININE mg/dL 1.15 1.38*   CALCIUM mg/dL 9.4 9.2   GLUCOSE mg/dL 134* 166*   ALBUMIN g/dL 3.61 3.80   BILIRUBIN mg/dL 0.9 0.9   ALK PHOS U/L 70 78   AST (SGOT) U/L 61* 100*   ALT (SGPT) U/L 28 33   Estimated Creatinine Clearance: 63.8 mL/min (by C-G formula based on SCr of 1.15 mg/dL).  No results found for: AMMONIA          No results found for: HGBA1C  No results found for: TSH, FREET4  No results found for: PREGTESTUR, PREGSERUM, HCG, HCGQUANT  Pain Management Panel    There is no flowsheet data to display.           ---------------------------------------------------------------------------------------------------------------------  Imaging Results (Last 7 Days)     Procedure Component Value Units Date/Time    MRI Hip Right With & Without Contrast [[id number]] Collected: 10/07/22 1345     Updated: 10/07/22 7156    Narrative:      EXAM:    MR Right Lower Extremity Without and With Intravenous Contrast, Hip     EXAM DATE:    10/7/2022 1:03 PM     CLINICAL HISTORY:    Severe right hip pain/leukocytosis/fever/inability to walk     TECHNIQUE:    Multiplanar magnetic resonance images of the right hip without and  with intravenous contrast.     COMPARISON:    10/06/2022     FINDINGS:       BONES/JOINTS:  Again noted are fairly advanced changes of  osteoarthritic change and joint space collapse of the right hip.  Subchondral cystic change of both the femoral head and acetabulum are  noted. While no characteristic findings of AVN are noted this may  represent sequelae of chronic AVN and subcortical  collapse.  In setting  of fever and hip pain chronic septic arthritis could also be considered  but the morphologic alteration of the joint do not appear to be acute.   No dislocation.       Impression:      1.  Again noted are fairly advanced changes of osteoarthritic change and  joint space collapse of the right hip. Subchondral cystic change of both  the femoral head and acetabulum are noted. While no characteristic  findings of AVN are noted this may represent sequelae of chronic AVN and  subcortical collapse.  2.  Small right hip joint effusion that could be reactive secondary to  advanced osteoarthritic change.  3.  In setting of fever and hip pain chronic septic arthritis could also  be considered but the morphologic alteration of the joint do not appear  to be acute.     This report was finalized on 10/7/2022 1:47 PM by Dr. Moiz Lobo MD.       XR Chest AP [[id number]] Collected: 10/07/22 1124     Updated: 10/07/22 1140    Narrative:      EXAM:    XR Chest, 1 View     EXAM DATE:    10/7/2022 10:31 AM     CLINICAL HISTORY:    fever     TECHNIQUE:    Frontal view of the chest.     COMPARISON:    No relevant prior studies available.     FINDINGS:    LUNGS:  Coarsened interstitial markings noted throughout the lungs.    PLEURAL SPACE:  Unremarkable.  No pneumothorax.    HEART:  Cardiomegaly.    MEDIASTINUM:  Unremarkable.    BONES/JOINTS:  Unremarkable.       Impression:        Cardiomegaly.     This report was finalized on 10/7/2022 11:24 AM by Dr. Moiz Lobo MD.             Last echocardiogram: No previous echo on file.      I have personally reviewed the above radiology images and read the final radiology report on 10/07/22  ---------------------------------------------------------------------------------------------------------------------  Assessment / Plan     Active Hospital Problems    Diagnosis  POA   • Septic hip (HCC) [M00.9]  Yes       ASSESSMENT/PLAN:  -Sepsis criteria met on admission with  WBC>12 and C-RP elevation likely 2/2 acute cystitis  -Initial concern for septic arthritis of the right hip  -C-RP 21.28, WBCs 15.83, and procalcitonin 4.29 on admission.  -Urinalysis with + nitrites, 1+ leukocytes, 31-50 WBC's and 4+ bacteria.  -Urine culture added, pending.   -Peripheral blood cultures x2 pending.  -Received IV vancomycin and zosyn initially.  -Plan for monotherapy with IV rocephin since sepsis most likely secondary to UTI.  -CT pelvis without contrast revealed marked deformity of the right humeral head likely related to chronic avascular necrosis or advanced osteoarthritis. No fracture.  -MRI of right hip obtained revealing advanced osteoarthritic changes, joint space collapse. No characteristic findings of acute AVN noted; possible chronic AVN and subcortical collapse. Also noted small right hip joint effusion.  -Gentle IV hydration with NS @ 50 ml/hr.   -Pain control with PRN Percocet.   -Will add PRN Flexeril for muscle spasms.  -Consult PT/OT.   -Afebrile; closely monitor VS per hospital policy.  -Tylenol as needed for fever or mild pain.   -Repeat CBC in the a.m.     -Essential hypertension  -BP appears controlled currently.  -Plan to resume home antihypertensive regimen once reconciled per pharmacy with appropriate holding parameters to prevent hypotension and/or bradycardia.   -Closely monitor BP per hospital protocol, titrate medications as necessary.    -BPH  -Urinary retention  -Chronic indwelling jorge in place, draining light yellow urine.  -Replaced yesterday (10/6) per patient report.   -Denies symptoms of dysuria or flank pain on exam.   -Catheter care per protocol.       ----------  -DVT prophylaxis: Subcutaneous heparin.  -Activity: As tolerated; fall precautions.  -Expected length of stay:   INPATIENT status due to the need for care which can only be reasonably provided in an hospital setting such as aggressive/expedited ancillary services and/or consultation services, the  necessity for IV medications, close physician monitoring and/or the possible need for procedures.  In such, I feel patient's risk for adverse outcomes and need for care warrant INPATIENT evaluation and predict the patient's care encounter to likely last beyond 2 midnights.  -Disposition pending clinical course.    High risk secondary to sepsis on admission likely 2/2 acute cystitis in setting of chronic indwelling jorge catheter.           KANDICE Can   10/07/22  16:18 EDT  Pager #[phone]  ---------------------------------------------------------------------------------------------------------------------       Electronically signed by Laurence Pelayo MD at 10/07/22 1910       Vital Signs (last day)     Date/Time Temp Temp src Pulse Resp BP Patient Position SpO2    10/17/22 0600 97.8 (36.6) Oral 68 16 160/63 Lying 95    10/17/22 0252 98.4 (36.9) Oral 69 16 102/55 Lying 98    10/16/22 2304 -- -- 88 -- 135/67 Lying --    10/16/22 1851 98.4 (36.9) Oral 71 20 135/56 Lying 98    10/16/22 1400 98.1 (36.7) Oral 75 18 135/58 Lying 96    10/16/22 0600 97.9 (36.6) Oral 74 16 139/63 Lying 98    10/16/22 0258 98.1 (36.7) Oral 62 16 138/62 Lying 97          Intake & Output (last day)       10/16 0701  10/17 0700 10/17 0701  10/18 0700    P.O. 1140     I.V. (mL/kg)      Total Intake(mL/kg) 1140 (10.4)     Urine (mL/kg/hr) 1000 (0.4)     Drains 30     Stool 0     Wound 40     Total Output 1070     Net +70           Stool Unmeasured Occurrence 1 x         Lines, Drains & Airways     Active LDAs     Name Placement date Placement time Site Days    PICC Single Lumen 10/14/22 Left Brachial 10/14/22  1350  Brachial  2    Peripheral IV 10/07/22 1151 Anterior;Distal;Right;Upper Arm 10/07/22  1151  Arm  9    Closed/Suction Drain 1 Right;Anterior Thigh Accordion 10 Fr. 10/14/22  0915  Thigh  3    Urethral Catheter 10/06/22  0726  -- 11                  Current Facility-Administered Medications   Medication Dose Route Frequency  Provider Last Rate Last Admin   • acetaminophen (TYLENOL) tablet 650 mg  650 mg Oral Q6H PRN Chris Alberto DO   650 mg at 10/13/22 1725   • amLODIPine (NORVASC) tablet 10 mg  10 mg Oral Daily Chris Alberto, DO   10 mg at 10/17/22 0914   • cyclobenzaprine (FLEXERIL) tablet 5 mg  5 mg Oral TID PRN Chris Alberto DO   5 mg at 10/12/22 0501   • Diclofenac Sodium (VOLTAREN) 1 % gel 4 g  4 g Topical 4x Daily PRN Chris Alberto DO       • ertapenem (INVanz) 1 g in sodium chloride 0.9 % 100 mL IVPB-VTB  1 g Intravenous Q24H Chris Alberto  mL/hr at 10/16/22 1118 1 g at 10/16/22 1118   • ferrous sulfate tablet 325 mg  325 mg Oral Daily With Breakfast Chris Alberto DO   325 mg at 10/17/22 0914   • heparin (porcine) 5000 UNIT/ML injection 5,000 Units  5,000 Units Subcutaneous Q8H Chris Alberto DO   5,000 Units at 10/17/22 0618   • HYDROcodone-acetaminophen (NORCO)  MG per tablet 1 tablet  1 tablet Oral Q4H PRN Chris Alberto DO       • HYDROcodone-acetaminophen (NORCO) 7.5-325 MG per tablet 1 tablet  1 tablet Oral Q4H PRN Chris Alberto DO   1 tablet at 10/17/22 0918   • ketoconazole (NIZORAL) 2 % shampoo   Topical Daily Petty Hampton MD   Given at 10/17/22 0914   • melatonin tablet 5 mg  5 mg Oral Nightly PRN Chris Alberto DO   5 mg at 10/15/22 2018   • metoprolol tartrate (LOPRESSOR) tablet 25 mg  25 mg Oral BID Chris Alberto DO   25 mg at 10/17/22 0914   • Pharmacy Consult   Does not apply Continuous PRN Chris Alberto DO       • polyethylene glycol (MIRALAX) packet 17 g  17 g Oral Daily Chris Alberto DO       • prenatal vitamin 27-0.8 tablet 1 tablet  1 tablet Oral Daily Chris Alberto DO   1 tablet at 10/17/22 0914   • simethicone (MYLICON) chewable tablet 80 mg  80 mg Oral 4x Daily PRN Chris Alberto DO       • sodium chloride 0.9 % flush 10 mL  10 mL Intravenous PRN Chris Alberto DO       • sodium chloride 0.9 % flush 10 mL  10 mL Intravenous Q12H Chris Alberto  DO SHAHRAM   10 mL at 10/17/22 0915   • sodium chloride 0.9 % flush 10 mL  10 mL Intravenous PRN Chris Alberto DO       • sodium chloride 0.9 % flush 10 mL  10 mL Intravenous Q12H Chris Alberto DO   10 mL at 10/16/22 2147   • sodium chloride 0.9 % flush 10 mL  10 mL Intravenous PRN Chris Alberto DO       • sodium chloride 0.9 % flush 10 mL  10 mL Intravenous Q12H Petty Hampton MD   10 mL at 10/17/22 0915   • sodium chloride 0.9 % flush 10 mL  10 mL Intravenous PRN Petty Hampton MD       • sodium chloride 0.9 % flush 20 mL  20 mL Intravenous PRN Petty Hampton MD       • tamsulosin (FLOMAX) 24 hr capsule 0.4 mg  0.4 mg Oral Daily Chris Alberto DO   0.4 mg at 10/17/22 0914   • vancomycin (VANCOCIN) capsule 125 mg  125 mg Oral Q6H Chris Alberto DO   125 mg at 10/17/22 0618   • vitamin D3 capsule 5,000 Units  5,000 Units Oral Daily Chris Alberto DO   5,000 Units at 10/17/22 0914        Operative/Procedure Notes (most recent note)      Crhis Alberto DO at 10/14/22 0813        Solitario Mcdaniel  [id number]  1950     Date of Procedure: 10/14/22    Preoperative Diagnosis: Right hip septic arthritis    Postoperative Diagnosis: Right hip avascular necrosis with possible underlying septic arthritis     Procedure: Right hip incision, irrigation and drainage with placement of Hemovac drain and antibiotic beads    Indications for Surgery:     Surgeon(s): Chris Alberto DO     Anesthesia: LMA    EBL: 40 cc    Tourniquet: None    Preoperative Antibiotic: No additional abx were given prior to incision.     Specimens: Cultures     Complications: None    Disposition: Stable to the recovery room      Operative Procedure: The patient was taken to the operating room and placed supine on the operating room table. A timeout was performed to verify the appropriate location, patient name, and intended procedure.  All bony prominences were well-padded. Anesthesia used an LMA for airway management during  this procedure. The patient was placed on the [place] table with both legs in traction.  The right hip was very stiff both with internal and external rotation.    The right hip was then prepped with ChloraPrep and draped sterilely.  The planned skin incision was marked along the anterolateral aspect of the right hip, 1 cm distal and 1 cm lateral to the ASIS.  The skin was then covered with an Ioban drape.    The skin incision was then made with a 10 blade knife.  Electrocautery was utilized to cauterize any superficial bleeding vessels and dissect down to the fascia overlying the tensor and the sartorius.  This was then incised and dissected free of the underlying muscle.  The perforating vessels were then able to be identified, and these were cauterized.    A retractor was then placed on the superior aspect of the femoral neck, and this was clearly able to be palpated.  I then utilized the Bovie to incise the capsule, and expected a large rush of fluid with the previous positive aspirate from the right hip.  However, there was minimal to no fluid from the right hip.  The capsule was released along the intertrochanteric region, to allow for it to be reflected and ensure that we were indeed intracapsular with the dissection.  Hohmann retractors were placed superiorly and inferiorly along the femoral neck, and attempted rotation was then performed.  The hip was extremely stiff, even with traction.  No fluid is noted with this rotation.    3 L of normal saline with vancomycin was then infiltrated throughout this entire region with the hip being rotated internally and externally.  I then irrigated with an additional 3 L of normal saline without any antibiotic solution through the hip joint.    Antibiotic beads were then placed which included both tobramycin and vancomycin and these were placed within the hip joint.  I then used PDS suture to approximate the joint capsule.  The Hemovac drain was then placed intracapsularly  along the inferior aspect of the femoral neck, and the fascia was then approximated with PDS suture.  The drain was carefully avoided to allow this to be pulled without any repeat surgical treatment.    The skin edges then approximated with 2-0 Monocryl and the drain was then sutured to the skin to prevent any traumatic removal.  The skin was then approximated with 2-0 nylon, and a sterile dressing was then placed over the surgical site.  Adequate hemostasis was noted.    The patient is able to be extubated and transferred to the postoperative recovery in stable condition.          The patient was able to be extubated and transferred to the postoperative recovery unit in stable condition. I did discuss the operative findings with the patient's family in the preoperative waiting area.    Chris Alberto DO  10/14/22  09:27 EDT        Electronically signed by Chris Alberto DO at 10/14/22 0977          Physician Progress Notes (most recent note)      Dena Foster APRN at 10/17/22 1009                     PROGRESS NOTE         Patient Identification:  Name:  Solitario Mcdaniel  Age:  71 y.o.  Sex:  male  :  1950  MRN:  [id number]  Visit Number:  [id number]  Primary Care Provider:  Corrine Mcdaniel APRN         LOS: 10 days       ----------------------------------------------------------------------------------------------------------------------  Subjective       Chief Complaints:    Hip Pain        Interval History:      Patient resting in bed this morning.  Reports significant right hip pain this morning.  Of note patient's Hemovac drain to was laying underneath right hip, repositioned to remove patient off of Hemovac drain tube.  Improved pain after repositioning.  Patient reports 1 bowel movement overnight that was formed.  Afebrile.  Continues on 2 L per nasal cannula with no apparent distress.    Review of Systems:    Constitutional: no fever, chills and night sweats. No appetite change or  unexpected weight change.  Fatigue and generalized weakness.  Eyes: no eye drainage, itching or redness.  HEENT: no mouth sores, dysphagia or nose bleed.  Respiratory: no for shortness of breath, cough or production of sputum.  Cardiovascular: no chest pain, no palpitations, no orthopnea.   Gastrointestinal: no nausea, vomiting. No diarrhea. No abdominal pain, hematemesis or rectal bleeding.  Genitourinary: no dysuria or polyuria.  Hematologic/lymphatic: no lymph node abnormalities, no easy bruising or easy bleeding.  Musculoskeletal:  Right hip pain.  Skin: No rash and no itching.  Neurological: no loss of consciousness, no seizure, no headache.  Chronic right upper extremity weakness.  Behavioral/Psych: no depression or suicidal ideation.  Endocrine: no hot flashes.  Immunologic: negative.      ----------------------------------------------------------------------------------------------------------------------      Objective       Current Hospital Meds:  amLODIPine, 10 mg, Oral, Daily  ertapenem, 1 g, Intravenous, Q24H  ferrous sulfate, 325 mg, Oral, Daily With Breakfast  heparin (porcine), 5,000 Units, Subcutaneous, Q8H  ketoconazole, , Topical, Daily  metoprolol tartrate, 25 mg, Oral, BID  polyethylene glycol, 17 g, Oral, Daily  prenatal vitamin 27-0.8, 1 tablet, Oral, Daily  sodium chloride, 10 mL, Intravenous, Q12H  sodium chloride, 10 mL, Intravenous, Q12H  sodium chloride, 10 mL, Intravenous, Q12H  tamsulosin, 0.4 mg, Oral, Daily  vancomycin, 125 mg, Oral, Q6H  vitamin D3, 5,000 Units, Oral, Daily      Pharmacy Consult,       ----------------------------------------------------------------------------------------------------------------------    Vital Signs:  Temp:  [97.8 °F (36.6 °C)-98.4 °F (36.9 °C)] 97.8 °F (36.6 °C)  Heart Rate:  [68-88] 68  Resp:  [16-20] 16  BP: (102-160)/(55-67) 160/63  Mean Arterial Pressure (Non-Invasive) for the past 24 hrs (Last 3 readings):   Noninvasive MAP (mmHg)   10/17/22  0600 107   10/16/22 2304 87   10/16/22 1400 83     SpO2 Percentage    10/16/22 1851 10/17/22 0252 10/17/22 0600   SpO2: 98% 98% 95%     SpO2:  [95 %-98 %] 95 %  on  Flow (L/min):  [2] 2;   Device (Oxygen Therapy): nasal cannula    Body mass index is 42.94 kg/m².  Wt Readings from Last 3 Encounters:   10/07/22 110 kg (242 lb 6.4 oz)   10/06/22 109 kg (240 lb)   06/17/21 118 kg (260 lb)        Intake/Output Summary (Last 24 hours) at 10/17/2022 1009  Last data filed at 10/17/2022 0700  Gross per 24 hour   Intake 1020 ml   Output 710 ml   Net 310 ml     Diet Regular  ----------------------------------------------------------------------------------------------------------------------      Physical Exam:    Constitutional:  male lying in bed on room air with no apparent distress.  HENT:  Head: Normocephalic and atraumatic.  Mouth:  Moist mucous membranes.  Poor dentition.  Eyes:  Conjunctivae and EOM are normal.  No scleral icterus.  Neck:  Neck supple.  No JVD present.    Cardiovascular:  Normal rate, regular rhythm and normal heart sounds with no murmur. No edema.  Pulmonary/Chest:  No respiratory distress, no wheezes, no crackles, with normal breath sounds and good air movement.  Abdominal:  Soft.  Bowel sounds are normal.  No distension and no tenderness. Large hernia of the left sided abdomen.  Chronic indwelling Martin catheter with clear yellow urine in collection bag.  Musculoskeletal:  No tenderness and no deformity.  No swelling or redness of joints.  1+ edema of bilateral lower extremities with venous stasis changes.  Right hip pain with movement, improving.  Hemovac drain in place with serosanguineous drainage.  Neurological:  Alert and oriented to person, place, and time.  No facial droop.  No slurred speech.   Right upper extremity chronic weakness.  Skin:  Skin is warm and dry.  No rash noted.  No pallor.  Venous stasis changes of bilateral lower extremities.  Psychiatric:  Normal mood and affect.   Behavior is normal.       ----------------------------------------------------------------------------------------------------------------------            Results from last 7 days   Lab Units 10/16/22  1047 10/15/22  0454 10/14/22  0450 10/13/22  0930 10/13/22  0118 10/12/22  0056 10/11/22  0210 10/10/22  1103   CRP mg/dL 16.29*  --   --  19.13*  --   --  18.80*  --    LACTATE mmol/L  --   --   --  0.8  --   --   --   --    WBC 10*3/mm3  --  12.74* 17.52*  --  23.92*   < > 10.83*  --    HEMOGLOBIN g/dL  --  12.1* 12.4*  --  13.9   < > 12.4*  --    HEMATOCRIT %  --  38.0 38.3  --  41.8   < > 37.6  --    MCV fL  --  92.9 90.3  --  90.3   < > 89.3  --    MCHC g/dL  --  31.8 32.4  --  33.3   < > 33.0  --    PLATELETS 10*3/mm3  --  321 319  --  313   < > 239  --    INR   --   --   --   --   --   --   --  1.04    < > = values in this interval not displayed.     Results from last 7 days   Lab Units 10/15/22  0454 10/14/22  0450 10/13/22  0118   SODIUM mmol/L 132* 137 132*   POTASSIUM mmol/L 4.5 4.1 5.1   CHLORIDE mmol/L 102 103 98   CO2 mmol/L 20.0* 23.2 20.1*   BUN mg/dL 27* 30* 25*   CREATININE mg/dL 1.16 1.30* 1.39*   CALCIUM mg/dL 8.5* 8.9 9.1   GLUCOSE mg/dL 115* 100* 116*   Estimated Creatinine Clearance: 63.6 mL/min (by C-G formula based on SCr of 1.16 mg/dL).  No results found for: AMMONIA    Hemoglobin A1C   Date/Time Value Ref Range Status   10/15/2022 0454 5.60 4.80 - 5.60 % Final     Lab Results   Component Value Date    HGBA1C 5.60 10/15/2022     No results found for: TSH, FREET4    Blood Culture   Date Value Ref Range Status   10/09/2022 Abnormal Stain (C)  Preliminary   10/09/2022 Abnormal Stain (C)  Preliminary   10/07/2022 Klebsiella pneumoniae ESBL (C)  Final     Comment:       Consider infectious disease consult.  Susceptibility results may not correlate to clinical outcomes.  For ESBL-producing infections in the blood, a carbapenem is recommended as first-line therapy for optimal clinical outcomes.    10/07/2022 Klebsiella pneumoniae ESBL (C)  Final     Comment:       Consider infectious disease consult.  Susceptibility results may not correlate to clinical outcomes.  For ESBL-producing infections in the blood, a carbapenem is recommended as first-line therapy for optimal clinical outcomes.     Urine Culture   Date Value Ref Range Status   10/07/2022 >100,000 CFU/mL Klebsiella pneumoniae ESBL (A)  Final     Comment:       Consider infectious disease consult.  Susceptibility results may not correlate to clinical outcomes.     No results found for: WOUNDCX  No results found for: STOOLCX  No results found for: RESPCX  Pain Management Panel    There is no flowsheet data to display.           ----------------------------------------------------------------------------------------------------------------------  Imaging Results (Last 24 Hours)     ** No results found for the last 24 hours. **          --------------------------------------------------------------------------------------    Pertinent Infectious Disease Results     Urinalysis on 10/7/2022 was positive with 31-50 WBCs and 4+ bacteria.  Urine culture finalized as greater than 100,000 colonies of ESBL E. coli.  Chest x-ray on 10/7/2022 showed cardiomegaly.  Coarsened interstitial markings noted throughout the lungs.  X-ray of the right hip on 10/6/2022 showed very advanced right hip osteoarthritic change and possibly chronic AVN with deformity of the femoral head contour.  CT pelvis on 10/6/2022 showed there is a large anterior abdominal wall ventral hernia containing nondilated large and small bowel.  Again there is marked deformity of the right humeral head likely related to chronic AVN or advanced osteoarthritis.  No fracture line is evident.  MRI of the right hip on 10/7/2022 showed again noted are fairly advanced changes of osteoarthritic change and joint space collapse of the right hip.  Subchondral cystic change of both the femoral head and  acetabulum are noted.  While no characteristic findings of AVN are noted this may represent sequelae of chronic AVN and subcortical collapse.  Small right hip joint effusion that could be reactive secondary to advanced osteoarthritic change.  In setting of fever and hip pain chronic septic arthritis could also be considered but the morphologic alteration of the joint do not appear to be acute.  COVID-19 and flu A/B PCR on 10/7/2022 was negative.  Blood cultures on 10/7/2022 finalized as ESBL Klebsiella pneumoniae.  C. difficile toxin PCR was positive although antigen was negative.    Patient resting in bed this morning.  Reports significant right hip pain this morning.  Of note patient's Hemovac drain to was laying underneath right hip, repositioned to remove patient off of Hemovac drain tube.  Improved pain after repositioning.  Patient reports 1 bowel movement overnight that was formed.  Afebrile.  Continues on 2 L per nasal cannula with no apparent distress.  Patient continues on Invanz and p.o. vancomycin.    Assessment/Plan         ASSESSMENT:    Sepsis present on admission  ESBL Klebsiella pneumoniae bacteremia  ESBL Klebsiella pneumoniae complicated UTI      PLAN:        Code Status:   Code Status and Medical Interventions:   Ordered at: 10/07/22 1416     Code Status (Patient has no pulse and is not breathing):    CPR (Attempt to Resuscitate)     Medical Interventions (Patient has pulse or is breathing):    Full Support       KANDICE Ordoñez  10/17/22  10:09 EDT    Electronically signed by KANDICE Ordoñez, 10/17/22, 10:10 AM EDT.        Electronically signed by Dena Foster APRN at 10/17/22 1011          Consult Notes (most recent note)      Elisa Verduzco PA-C at 10/09/22 1126      Consult Orders    1. Inpatient Infectious Diseases Consult [[id number]] ordered by Tania Campos APRN at 10/08/22 1327          Attestation signed by Olena Ramsay MD at 10/10/22 7207    I have reviewed this  documentation and agree.                          INFECTIOUS DISEASE CONSULTATION REPORT        Patient Identification:  Name:  Solitario Mcdaniel  Age:  71 y.o.  Sex:  male  :  1950  MRN:  [id number]   Visit Number:  [id number]  Primary Care Physician:  Provider, No Known  Referring Provider: Tania Campos APRN  Reason for consult: Bacteremia       LOS: 2 days        Subjective       Subjective     History of present illness:      Thank you Dr. Avilez for allowing us to participate in the care of your patient.  As you well know, Mr. Solitario Mcdaniel is a 71 y.o. male with past medical history significant for arthritis, essential hypertension, and BPH, who presented to [facility] on 10/7/2022 for right hip pain status post fall.    Today, the patient is sitting up in bed on room air in no apparent distress.  Reports a history of chronic Martin catheter since  after having been hospitalized for COVID-19 with a nursing home stay after that time.  Reports a fall from his recliner a few days ago and since that time patient has had worsening right hip pain with difficulty ambulating.  Patient is typically able to use a walker, but is currently very sensitive to any palpation or movement of the right hip without exterior signs of trauma or infection.  No erythema, edema, warmth, or wound noted.  Martin catheter is in place with dark urine and sediment in the tubing. Dr. Alberto has evaluated the patient and believes imaging findings are related to chronic changes and believes a right hip arthroplasty would be beneficial, but patient is very high risk at this time. Afebrile, no diarrhea.  CRP is improved at 17.62.  WBC is improved at 12.59.  Lactic acid on admission was normal at 0.9.  Urinalysis on 10/7/2022 was positive with 31-50 WBCs and 4+ bacteria.  Urine culture finalized as greater than 100,000 colonies of ESBL E. coli.  Chest x-ray on 10/7/2022 showed cardiomegaly.   Coarsened interstitial markings noted throughout the lungs.  X-ray of the right hip on 10/6/2022 showed very advanced right hip osteoarthritic change and possibly chronic AVN with deformity of the femoral head contour.  CT pelvis on 10/6/2022 showed there is a large anterior abdominal wall ventral hernia containing nondilated large and small bowel.  Again there is marked deformity of the right humeral head likely related to chronic AVN or advanced osteoarthritis.  No fracture line is evident.  MRI of the right hip on 10/7/2022 showed again noted are fairly advanced changes of osteoarthritic change and joint space collapse of the right hip.  Subchondral cystic change of both the femoral head and acetabulum are noted.  While no characteristic findings of AVN are noted this may represent sequelae of chronic AVN and subcortical collapse.  Small right hip joint effusion that could be reactive secondary to advanced osteoarthritic change.  In setting of fever and hip pain chronic septic arthritis could also be considered but the morphologic alteration of the joint do not appear to be acute.  COVID-19 and flu A/B PCR on 10/7/2022 was negative.  2 out of 2 blood cultures on 10/7/2022 are so far showing gram-negative bacilli with BC ID finalized as ESBL Klebsiella pneumoniae.  Blood cultures on 10/9/2022 are in progress.    Infectious Disease consultation was requested for antimicrobial management.      ---------------------------------------------------------------------------------------------------------------------     Review Of Systems:    Constitutional: no fever, chills and night sweats. No appetite change or unexpected weight change.  Fatigue and generalized weakness.  Eyes: no eye drainage, itching or redness.  HEENT: no mouth sores, dysphagia or nose bleed.  Respiratory: no for shortness of breath, cough or production of sputum.  Cardiovascular: no chest pain, no palpitations, no orthopnea.  Bilateral leg  swelling.  Gastrointestinal: no nausea, vomiting or diarrhea. No abdominal pain, hematemesis or rectal bleeding.  Genitourinary: no dysuria or polyuria.  Hematologic/lymphatic: no lymph node abnormalities, no easy bruising or easy bleeding.  Musculoskeletal: Arthralgias.  Gait problems.  Right hip pain.  Skin: No rash and no itching.  Neurological: no loss of consciousness, no seizure, no headache.  Chronic right upper extremity weakness.  Behavioral/Psych: no depression or suicidal ideation.  Endocrine: no hot flashes.  Immunologic: negative.    ---------------------------------------------------------------------------------------------------------------------     Past Medical History    Past Medical History:   Diagnosis Date   • Arthritis    • BPH (benign prostatic hyperplasia)    • Hypertension    • Osteoarthritis        Past Surgical History    Past Surgical History:   Procedure Laterality Date   • COLONOSCOPY     • TONSILLECTOMY     • VASECTOMY         Family History    No family history on file.    Social History    Social History     Tobacco Use   • Smoking status: Never   Substance Use Topics   • Alcohol use: Never   • Drug use: Never       Allergies    Patient has no known allergies.  ---------------------------------------------------------------------------------------------------------------------     Home Medications:    Prior to Admission Medications     Prescriptions Last Dose Informant Patient Reported? Taking?    acetaminophen (TYLENOL) 650 MG 8 hr tablet Past Week Self Yes Yes    Take 650 mg by mouth Every 8 (Eight) Hours As Needed for Mild Pain or Moderate Pain.    amLODIPine (NORVASC) 10 MG tablet 10/7/2022 Pharmacy Yes Yes    Take 10 mg by mouth Daily.    Cholecalciferol (Vitamin D) 50 MCG (2000 UT) tablet 10/7/2022  Yes Yes    Take 2,000 Units by mouth Daily.    Diclofenac Sodium (VOLTAREN) 1 % gel gel Past Week Pharmacy Yes Yes    Apply 4 g topically to the appropriate area as directed 4  (Four) Times a Day As Needed (arthritis pain).    hydrALAZINE (APRESOLINE) 50 MG tablet 10/7/2022 Pharmacy Yes Yes    Take 50 mg by mouth 3 (Three) Times a Day.    metoprolol tartrate (LOPRESSOR) 25 MG tablet 10/7/2022 Pharmacy Yes Yes    Take 25 mg by mouth 2 (Two) Times a Day.    Prenatal Vit-Fe Fumarate-FA (prenatal vitamin 27-0.8) 27-0.8 MG tablet tablet 10/7/2022 Pharmacy Yes Yes    Take 1 tablet by mouth Daily.    tamsulosin (FLOMAX) 0.4 MG capsule 24 hr capsule 10/7/2022 Pharmacy Yes Yes    Take 1 capsule by mouth Daily.    HYDROcodone-acetaminophen (NORCO) 5-325 MG per tablet  Pharmacy No No    Take 1 tablet by mouth Every 8 (Eight) Hours As Needed for Moderate Pain.        ---------------------------------------------------------------------------------------------------------------------    Objective       Objective     Hospital Scheduled Meds:  amLODIPine, 10 mg, Oral, Daily  ertapenem, 1 g, Intravenous, Q24H  heparin (porcine), 5,000 Units, Subcutaneous, Q8H  metoprolol tartrate, 25 mg, Oral, BID  prenatal vitamin 27-0.8, 1 tablet, Oral, Daily  sodium chloride, 10 mL, Intravenous, Q12H  tamsulosin, 0.4 mg, Oral, Daily  vitamin D3, 5,000 Units, Oral, Daily      sodium chloride, 50 mL/hr, Last Rate: 50 mL/hr (10/09/22 1057)      ---------------------------------------------------------------------------------------------------------------------   Vital Signs:  Temp:  [98.3 °F (36.8 °C)-99.2 °F (37.3 °C)] 98.3 °F (36.8 °C)  Heart Rate:  [63-94] 63  Resp:  [16-18] 16  BP: (101-154)/(57-68) 101/59  No data found.  SpO2 Percentage    10/08/22 1856 10/09/22 0300 10/09/22 0600   SpO2: 96% 98% 93%     SpO2:  [93 %-98 %] 93 %  on   ;   Device (Oxygen Therapy): room air    Body mass index is 42.94 kg/m².  Wt Readings from Last 3 Encounters:   10/07/22 110 kg (242 lb 6.4 oz)   10/06/22 109 kg (240 lb)   06/17/21 118 kg (260 lb)      ---------------------------------------------------------------------------------------------------------------------     Physical Exam:    Constitutional:  male is sitting up in bed on room air in no apparent distress.  HENT:  Head: Normocephalic and atraumatic.  Mouth:  Moist mucous membranes.  Poor dentition.  Eyes:  Conjunctivae and EOM are normal.  No scleral icterus.  Neck:  Neck supple.  No JVD present.    Cardiovascular:  Normal rate, regular rhythm and normal heart sounds with no murmur. No edema.  Pulmonary/Chest:  No respiratory distress, no wheezes, no crackles, with normal breath sounds and good air movement.  Abdominal:  Soft.  Bowel sounds are normal.  No distension and no tenderness.  Large hernia of the left sided abdomen.  Chronic indwelling Martin catheter with dark urine and sediment in the tubing.  Musculoskeletal:  No tenderness and no deformity.  No swelling or redness of joints.  2+ edema of bilateral lower extremities with venous stasis changes.  Right hip without erythema, edema, warmth, or wound.  Pain with any palpation or movement.  Neurological:  Alert and oriented to person, place, and time.  No facial droop.  No slurred speech.   Right upper extremity chronic weakness.  Skin:  Skin is warm and dry.  No rash noted.  No pallor.  Venous stasis changes of bilateral lower extremities.  Psychiatric:  Normal mood and affect.  Behavior is normal.    ---------------------------------------------------------------------------------------------------------------------              Results from last 7 days   Lab Units 10/09/22  0231 10/08/22  0055 10/07/22  1138 10/07/22  1045 10/06/22  0753   CRP mg/dL 17.62*  --  21.28*  --   --    LACTATE mmol/L  --   --   --  0.9  --    WBC 10*3/mm3 12.59* 13.20*  --  15.83* 13.23*   HEMOGLOBIN g/dL 12.1* 11.3*  --  13.2 13.1   HEMATOCRIT % 35.8* 34.2*  --  38.5 38.8   MCV fL 88.2 89.5  --  87.1 88.2   MCHC g/dL 33.8 33.0  --  34.3 33.8   PLATELETS  10*3/mm3 193 172  --  203 179   INR   --   --   --   --  1.02     Results from last 7 days   Lab Units 10/09/22  0231 10/08/22  0055 10/07/22  1138 10/06/22  0753   SODIUM mmol/L 135* 137 141 141   POTASSIUM mmol/L 4.0 4.1 4.3 4.4   CHLORIDE mmol/L 100 104 103 105   CO2 mmol/L 25.2 24.3 23.3 23.2   BUN mg/dL 20 21 23 32*   CREATININE mg/dL 1.24 1.19 1.15 1.38*   CALCIUM mg/dL 8.7 8.5* 9.4 9.2   GLUCOSE mg/dL 120* 118* 134* 166*   ALBUMIN g/dL  --   --  3.61 3.80   BILIRUBIN mg/dL  --   --  0.9 0.9   ALK PHOS U/L  --   --  70 78   AST (SGOT) U/L  --   --  61* 100*   ALT (SGPT) U/L  --   --  28 33   Estimated Creatinine Clearance: 59.5 mL/min (by C-G formula based on SCr of 1.24 mg/dL).  No results found for: AMMONIA    No results found for: HGBA1C, POCGLU  No results found for: HGBA1C  No results found for: TSH, FREET4    Blood Culture   Date Value Ref Range Status   10/07/2022 Gram Negative Bacilli (C)  Preliminary   10/07/2022 Gram Negative Bacilli (C)  Preliminary     Urine Culture   Date Value Ref Range Status   10/07/2022 >100,000 CFU/mL Klebsiella pneumoniae ESBL (A)  Final     Comment:       Consider infectious disease consult.  Susceptibility results may not correlate to clinical outcomes.     No results found for: WOUNDCX  No results found for: STOOLCX  No results found for: RESPCX  Pain Management Panel    There is no flowsheet data to display.       I have personally reviewed the above laboratory results.   ---------------------------------------------------------------------------------------------------------------------  Imaging Results (Last 7 Days)     Procedure Component Value Units Date/Time    MRI Hip Right With & Without Contrast [[id number]] Collected: 10/07/22 1345     Updated: 10/07/22 0265    Narrative:      EXAM:    MR Right Lower Extremity Without and With Intravenous Contrast, Hip     EXAM DATE:    10/7/2022 1:03 PM     CLINICAL HISTORY:    Severe right hip pain/leukocytosis/fever/inability  to walk     TECHNIQUE:    Multiplanar magnetic resonance images of the right hip without and  with intravenous contrast.     COMPARISON:    10/06/2022     FINDINGS:       BONES/JOINTS:  Again noted are fairly advanced changes of  osteoarthritic change and joint space collapse of the right hip.  Subchondral cystic change of both the femoral head and acetabulum are  noted. While no characteristic findings of AVN are noted this may  represent sequelae of chronic AVN and subcortical collapse.  In setting  of fever and hip pain chronic septic arthritis could also be considered  but the morphologic alteration of the joint do not appear to be acute.   No dislocation.       Impression:      1.  Again noted are fairly advanced changes of osteoarthritic change and  joint space collapse of the right hip. Subchondral cystic change of both  the femoral head and acetabulum are noted. While no characteristic  findings of AVN are noted this may represent sequelae of chronic AVN and  subcortical collapse.  2.  Small right hip joint effusion that could be reactive secondary to  advanced osteoarthritic change.  3.  In setting of fever and hip pain chronic septic arthritis could also  be considered but the morphologic alteration of the joint do not appear  to be acute.     This report was finalized on 10/7/2022 1:47 PM by Dr. Moiz Lobo MD.       XR Chest AP [[id number]] Collected: 10/07/22 1124     Updated: 10/07/22 1140    Narrative:      EXAM:    XR Chest, 1 View     EXAM DATE:    10/7/2022 10:31 AM     CLINICAL HISTORY:    fever     TECHNIQUE:    Frontal view of the chest.     COMPARISON:    No relevant prior studies available.     FINDINGS:    LUNGS:  Coarsened interstitial markings noted throughout the lungs.    PLEURAL SPACE:  Unremarkable.  No pneumothorax.    HEART:  Cardiomegaly.    MEDIASTINUM:  Unremarkable.    BONES/JOINTS:  Unremarkable.       Impression:        Cardiomegaly.     This report was finalized on 10/7/2022  11:24 AM by Dr. Moiz Lobo MD.           I have personally reviewed the above radiology results.   ---------------------------------------------------------------------------------------------------------------------      Pertinent Infectious Disease Results    CRP is improved at 17.62.  WBC is improved at 12.59.  Lactic acid on admission was normal at 0.9.  Urinalysis on 10/7/2022 was positive with 31-50 WBCs and 4+ bacteria.  Urine culture finalized as greater than 100,000 colonies of ESBL E. coli.  Chest x-ray on 10/7/2022 showed cardiomegaly.  Coarsened interstitial markings noted throughout the lungs.  X-ray of the right hip on 10/6/2022 showed very advanced right hip osteoarthritic change and possibly chronic AVN with deformity of the femoral head contour.  CT pelvis on 10/6/2022 showed there is a large anterior abdominal wall ventral hernia containing nondilated large and small bowel.  Again there is marked deformity of the right humeral head likely related to chronic AVN or advanced osteoarthritis.  No fracture line is evident.  MRI of the right hip on 10/7/2022 showed again noted are fairly advanced changes of osteoarthritic change and joint space collapse of the right hip.  Subchondral cystic change of both the femoral head and acetabulum are noted.  While no characteristic findings of AVN are noted this may represent sequelae of chronic AVN and subcortical collapse.  Small right hip joint effusion that could be reactive secondary to advanced osteoarthritic change.  In setting of fever and hip pain chronic septic arthritis could also be considered but the morphologic alteration of the joint do not appear to be acute.  COVID-19 and flu A/B PCR on 10/7/2022 was negative.  2 out of 2 blood cultures on 10/7/2022 are so far showing gram-negative bacilli with BC ID finalized as ESBL Klebsiella pneumoniae.  Blood cultures on 10/9/2022 are in progress.    Assessment & Plan      Assessment      Sepsis present on  admission  ESBL Klebsiella pneumoniae bacteremia  ESBL Klebsiella pneumoniae complicated UTI    Plan      I examined the patient this morning and he is sitting up in bed on room air in no apparent distress. He has had a chronic indwelling Martin catheter since 2021 after hospitalization for COVID-19.  Abdomen is soft, nontender, with normal bowel sounds and Martin catheter is in place with dark yellow urine and sediment in the tubing. CRP and white count are improving on Invanz, as urinalysis was positive with 31-50 WBCs and 4+ bacteria and urine culture finalized as greater than 100,000 colonies of ESBL E. coli.  2 out of 2 blood cultures are also showing gram-negative bacilli with BC ID finalized as ESBL Klebsiella pneumoniae.  We are agreeable with current management with Invanz.    Patient's clinical exam with right hip pain to any palpation or movement of the right hip is out of proportion to what is visualized on imaging. As MRI is most suggestive of chronic destructive changes, we are not concerned with septic arthritis at this time, but if patient were to develop any concerning symptoms with developing erythema, edema, or effusion, would recommend CT-guided aspiration at that time.    Again, thank you Dr. Avilez for allowing us to participate in the care of your patient and please feel free to call for any questions you may have.    Code Status:     Code Status and Medical Interventions:   Ordered at: 10/07/22 1416     Code Status (Patient has no pulse and is not breathing):    CPR (Attempt to Resuscitate)     Medical Interventions (Patient has pulse or is breathing):    Full Support     Elisa Verduzco PA-C  10/09/22  11:26 EDT     Electronically signed by Elisa Verduzco PA-C, 10/09/22, 11:48 AM EDT.        Electronically signed by Olena Ramsay MD at 10/10/22 5579       Nutrition Notes (most recent note)    No notes exist for this encounter.            Physical Therapy Notes (most recent note)  "     Fernando Fagan, PT at 10/13/22 1514  Version 1 of 1            10/13/22 1514   OTHER   Discipline physical therapist   Rehab Time/Intention   Session Not Performed patient/family declined treatment  (Pt. states \"I am in too much pain to do anything right now\".)       Electronically signed by Fernando Fagan, PT at 10/13/22 1514          Occupational Therapy Notes (most recent note)      Shantel Arnold, OT at 10/10/22 1127          Patient Name: Solitario Mcdaniel  : 1950    MRN: [id number]                              Today's Date: 10/10/2022       Admit Date: 10/7/2022    Visit Dx:     ICD-10-CM ICD-9-CM   1. Septic hip (HCC)  M00.9 711.05     Patient Active Problem List   Diagnosis   • Septic hip (HCC)     Past Medical History:   Diagnosis Date   • Arthritis    • BPH (benign prostatic hyperplasia)    • Hypertension    • Osteoarthritis      Past Surgical History:   Procedure Laterality Date   • COLONOSCOPY     • TONSILLECTOMY     • VASECTOMY        General Information     Row Name 10/10/22 1116          OT Time and Intention    Document Type evaluation  -LM     Mode of Treatment occupational therapy  -LM     Row Name 10/10/22 1116          General Information    Patient Profile Reviewed yes  -LM     Prior Level of Function ADL's;all household mobility;transfer;min assist:  lift recliner, bsc, cane, w/c.  Patient reports significant decline over past 6 months since covid  -LM     Existing Precautions/Restrictions fall  -LM     Barriers to Rehab previous functional deficit;medically complex  -LM     Row Name 10/10/22 1116          Living Environment    People in Home child(amanda), adult  -LM     Row Name 10/10/22 1116          Cognition    Orientation Status (Cognition) oriented x 3  -LM     Row Name 10/10/22 1116          Safety Issues, Functional Mobility    Impairments Affecting Function (Mobility) balance;coordination;endurance/activity tolerance;strength  -LM           User Key  (r) = Recorded By, (t) = " Taken By, (c) = Cosigned By    Initials Name Provider Type    LM Shantel Arnold, OT Occupational Therapist                 Mobility/ADL's     Row Name 10/10/22 1119          Transfers    Transfers toilet transfer  -LM     Comment, (Transfers) unable to safely tolerate due to pain  -     Row Name 10/10/22 1119          Activities of Daily Living    BADL Assessment/Intervention bathing;upper body dressing;lower body dressing;grooming;feeding;toileting  -     Row Name 10/10/22 1119          Bathing Assessment/Intervention    [place] Level (Bathing) maximum assist (25% patient effort);dependent (less than 25% patient effort)  -     Row Name 10/10/22 1119          Upper Body Dressing Assessment/Training    [place] Level (Upper Body Dressing) maximum assist (25% patient effort)  -LM     Row Name 10/10/22 1119          Lower Body Dressing Assessment/Training    [place] Level (Lower Body Dressing) dependent (less than 25% patient effort)  -     Row Name 10/10/22 1119          Self-Feeding Assessment/Training    [place] Level (Feeding) set up  -     Row Name 10/10/22 1119          Toileting Assessment/Training    [place] Level (Toileting) dependent (less than 25% patient effort)  -           User Key  (r) = Recorded By, (t) = Taken By, (c) = Cosigned By    Initials Name Provider Type    LM Shantel Arnold, OT Occupational Therapist               Obj/Interventions     Row Name 10/10/22 1121          Sensory Assessment (Somatosensory)    Sensory Assessment (Somatosensory) right UE  -LM     Right UE Sensory Assessment general sensation;light touch awareness;proprioception;impaired  -     Sensory Subjective Reports numbness;tingling  -     Row Name 10/10/22 1121          Vision Assessment/Intervention    Visual Impairment/Limitations WFL;corrective lenses full-time  -     Row Name 10/10/22 1121          Range of Motion Comprehensive    General Range of Motion upper extremity range of  motion deficits identified  -LM     Comment, General Range of Motion rue shoulder 1/4, elbow 3/4, wrist and hand 1/4.  -LM     Row Name 10/10/22 1121          Strength Comprehensive (MMT)    General Manual Muscle Testing (MMT) Assessment upper extremity strength deficits identified  -LM     Comment, General Manual Muscle Testing (MMT) Assessment Rue 2+  -LM     Row Name 10/10/22 1126 10/10/22 1121       Motor Skills    Motor Skills functional endurance  -LM coordination;functional endurance  -LM    Coordination -- fine motor deficit;gross motor deficit;right;severe impairment  -LM    Functional Endurance P+  -LM --          User Key  (r) = Recorded By, (t) = Taken By, (c) = Cosigned By    Initials Name Provider Type    LM Shantel Arnold, OT Occupational Therapist               Goals/Plan     Row Name 10/10/22 1125          Transfer Goal 1 (OT)    Activity/Assistive Device (Transfer Goal 1, OT) transfers, all;walker, rolling;commode, 3-in-1  -LM     [place] Level/Cues Needed (Transfer Goal 1, OT) moderate assist (50-74% patient effort);minimum assist (75% or more patient effort)  -LM     Time Frame (Transfer Goal 1, OT) by discharge  -     Row Name 10/10/22 1125          Problem Specific Goal 1 (OT)    Problem Specific Goal 1 (OT) increase fxl activity tolerance to F to assist with fxl mobility and badl.  -     Row Name 10/10/22 1125          Therapy Assessment/Plan (OT)    Planned Therapy Interventions (OT) activity tolerance training;adaptive equipment training;BADL retraining;patient/caregiver education/training;transfer/mobility retraining;strengthening exercise;ROM/therapeutic exercise  -LM           User Key  (r) = Recorded By, (t) = Taken By, (c) = Cosigned By    Initials Name Provider Type    LM Shantel Arnold, OT Occupational Therapist               Clinical Impression     Row Name 10/10/22 1124          Plan of Care Review    Plan of Care Reviewed With patient  -     Row Name 10/10/22 1124           Therapy Assessment/Plan (OT)    Patient/Family Therapy Goal Statement (OT) return to plof  -LM     Rehab Potential (OT) fair, will monitor progress closely  -LM     Criteria for Skilled Therapeutic Interventions Met (OT) yes;meets criteria;skilled treatment is necessary  -LM     Therapy Frequency (OT) other (see comments)  prn and/or to monitor fxl progress  -LM     Row Name 10/10/22 1124          Therapy Plan Review/Discharge Plan (OT)    Anticipated Discharge Disposition (OT) home with 24/7 care;home with home health;skilled nursing facility  versus  -LM     Row Name 10/10/22 1124          Positioning and Restraints    Post Treatment Position bed  -LM     In Bed call light within reach;encouraged to call for assist  -LM           User Key  (r) = Recorded By, (t) = Taken By, (c) = Cosigned By    Initials Name Provider Type    LM Shantel Arnold, OT Occupational Therapist               Outcome Measures     Row Name 10/10/22 0817          How much help from another person do you currently need...    Turning from your back to your side while in flat bed without using bedrails? 2  -VL     Moving from lying on back to sitting on the side of a flat bed without bedrails? 1  -VL     Moving to and from a bed to a chair (including a wheelchair)? 1  -VL     Standing up from a chair using your arms (e.g., wheelchair, bedside chair)? 1  -VL     Climbing 3-5 steps with a railing? 1  -VL     To walk in hospital room? 1  -VL     AM-PAC 6 Clicks Score (PT) 7  -VL     Highest level of mobility 2 --> Bed activities/dependent transfer  -VL           User Key  (r) = Recorded By, (t) = Taken By, (c) = Cosigned By    Initials Name Provider Type    VL Uma Sterling, RN Registered Nurse                  OT Recommendation and Plan  Planned Therapy Interventions (OT): activity tolerance training, adaptive equipment training, BADL retraining, patient/caregiver education/training, transfer/mobility retraining, strengthening exercise,  ROM/therapeutic exercise  Therapy Frequency (OT): other (see comments) (prn and/or to monitor fxl progress)  Plan of Care Review  Plan of Care Reviewed With: patient     Time Calculation:     Therapy Charges for Today     Code Description Service Date Service Provider Modifiers Qty    [id number]  OT EVAL MOD COMPLEXITY 4 10/10/2022 Shantel Arnold OT GO 1               Shantel Arnold OT  10/10/2022    Electronically signed by Shantel Arnold OT at 10/10/22 1128         ADL Documentation (most recent)    Flowsheet Row Most Recent Value   Transferring 3 - assistive equipment and person   Toileting 2 - assistive person   Bathing 2 - assistive person   Dressing 2 - assistive person   Eating 0 - independent   Communication 0 - understands/communicates without difficulty   Swallowing 0 - swallows foods/liquids without difficulty   Equipment Currently Used at Home walker, standard, wheelchair, other (see comments)  [Unknown provider for DME, pt has a lift chair at home that he sleeps in at night.]

## 2022-10-18 LAB
ANION GAP SERPL CALCULATED.3IONS-SCNC: 9.4 MMOL/L (ref 5–15)
BUN SERPL-MCNC: 19 MG/DL (ref 8–23)
BUN/CREAT SERPL: 18.4 (ref 7–25)
CALCIUM SPEC-SCNC: 9 MG/DL (ref 8.6–10.5)
CHLORIDE SERPL-SCNC: 100 MMOL/L (ref 98–107)
CO2 SERPL-SCNC: 22.6 MMOL/L (ref 22–29)
CREAT SERPL-MCNC: 1.03 MG/DL (ref 0.76–1.27)
CRP SERPL-MCNC: 14.77 MG/DL (ref 0–0.5)
DEPRECATED RDW RBC AUTO: 51.8 FL (ref 37–54)
EGFRCR SERPLBLD CKD-EPI 2021: 77.2 ML/MIN/1.73
ERYTHROCYTE [DISTWIDTH] IN BLOOD BY AUTOMATED COUNT: 15.1 % (ref 12.3–15.4)
GLUCOSE SERPL-MCNC: 120 MG/DL (ref 65–99)
HCT VFR BLD AUTO: 40.1 % (ref 37.5–51)
HGB BLD-MCNC: 12.5 G/DL (ref 13–17.7)
MCH RBC QN AUTO: 28.9 PG (ref 26.6–33)
MCHC RBC AUTO-ENTMCNC: 31.2 G/DL (ref 31.5–35.7)
MCV RBC AUTO: 92.8 FL (ref 79–97)
PLATELET # BLD AUTO: 392 10*3/MM3 (ref 140–450)
PMV BLD AUTO: 9.6 FL (ref 6–12)
POTASSIUM SERPL-SCNC: 4.7 MMOL/L (ref 3.5–5.2)
RBC # BLD AUTO: 4.32 10*6/MM3 (ref 4.14–5.8)
SODIUM SERPL-SCNC: 132 MMOL/L (ref 136–145)
WBC NRBC COR # BLD: 13.12 10*3/MM3 (ref 3.4–10.8)

## 2022-10-18 PROCEDURE — 86140 C-REACTIVE PROTEIN: CPT | Performed by: NURSE PRACTITIONER

## 2022-10-18 PROCEDURE — 99232 SBSQ HOSP IP/OBS MODERATE 35: CPT | Performed by: INTERNAL MEDICINE

## 2022-10-18 PROCEDURE — 80048 BASIC METABOLIC PNL TOTAL CA: CPT | Performed by: INTERNAL MEDICINE

## 2022-10-18 PROCEDURE — 25010000002 ERTAPENEM PER 500 MG: Performed by: NURSE PRACTITIONER

## 2022-10-18 PROCEDURE — 85027 COMPLETE CBC AUTOMATED: CPT | Performed by: INTERNAL MEDICINE

## 2022-10-18 PROCEDURE — 25010000002 HEPARIN (PORCINE) PER 1000 UNITS: Performed by: ORTHOPAEDIC SURGERY

## 2022-10-18 RX ADMIN — Medication 10 ML: at 22:05

## 2022-10-18 RX ADMIN — METOPROLOL TARTRATE 25 MG: 25 TABLET, FILM COATED ORAL at 22:03

## 2022-10-18 RX ADMIN — HYDROCODONE BITARTRATE AND ACETAMINOPHEN 1 TABLET: 7.5; 325 TABLET ORAL at 08:47

## 2022-10-18 RX ADMIN — HEPARIN SODIUM 5000 UNITS: 5000 INJECTION INTRAVENOUS; SUBCUTANEOUS at 13:57

## 2022-10-18 RX ADMIN — NYSTATIN AND TRIAMCINOLONE ACETONIDE 1 APPLICATION: 100000; 1 CREAM TOPICAL at 22:04

## 2022-10-18 RX ADMIN — Medication 10 ML: at 08:42

## 2022-10-18 RX ADMIN — KETOCONAZOLE: 20 SHAMPOO, SUSPENSION TOPICAL at 08:42

## 2022-10-18 RX ADMIN — HEPARIN SODIUM 5000 UNITS: 5000 INJECTION INTRAVENOUS; SUBCUTANEOUS at 22:03

## 2022-10-18 RX ADMIN — Medication 5000 UNITS: at 08:41

## 2022-10-18 RX ADMIN — METOPROLOL TARTRATE 25 MG: 25 TABLET, FILM COATED ORAL at 08:41

## 2022-10-18 RX ADMIN — NYSTATIN AND TRIAMCINOLONE ACETONIDE 1 APPLICATION: 100000; 1 CREAM TOPICAL at 13:57

## 2022-10-18 RX ADMIN — HEPARIN SODIUM 5000 UNITS: 5000 INJECTION INTRAVENOUS; SUBCUTANEOUS at 05:28

## 2022-10-18 RX ADMIN — HYDROCODONE BITARTRATE AND ACETAMINOPHEN 1 TABLET: 10; 325 TABLET ORAL at 13:56

## 2022-10-18 RX ADMIN — FERROUS SULFATE TAB 325 MG (65 MG ELEMENTAL FE) 325 MG: 325 (65 FE) TAB at 08:42

## 2022-10-18 RX ADMIN — VANCOMYCIN HYDROCHLORIDE 125 MG: 125 CAPSULE ORAL at 01:15

## 2022-10-18 RX ADMIN — PRENATAL VIT W/ FE FUMARATE-FA TAB 27-0.8 MG 1 TABLET: 27-0.8 TAB at 08:41

## 2022-10-18 RX ADMIN — VANCOMYCIN HYDROCHLORIDE 125 MG: 125 CAPSULE ORAL at 05:28

## 2022-10-18 RX ADMIN — ERTAPENEM SODIUM 1 G: 1 INJECTION, POWDER, LYOPHILIZED, FOR SOLUTION INTRAMUSCULAR; INTRAVENOUS at 11:21

## 2022-10-18 RX ADMIN — AMLODIPINE BESYLATE 10 MG: 10 TABLET ORAL at 08:42

## 2022-10-18 RX ADMIN — VANCOMYCIN HYDROCHLORIDE 125 MG: 125 CAPSULE ORAL at 17:20

## 2022-10-18 RX ADMIN — Medication 10 ML: at 22:03

## 2022-10-18 RX ADMIN — VANCOMYCIN HYDROCHLORIDE 125 MG: 125 CAPSULE ORAL at 11:21

## 2022-10-18 RX ADMIN — TAMSULOSIN HYDROCHLORIDE 0.4 MG: 0.4 CAPSULE ORAL at 08:41

## 2022-10-18 NOTE — SIGNIFICANT NOTE
"   10/18/22 1504   OTHER   Discipline physical therapist   Rehab Time/Intention   Session Not Performed patient/family declined treatment  (Pt. refused PT stating \"my hip hurts too much, maybe tomorrow\".)     "

## 2022-10-18 NOTE — PLAN OF CARE
Goal Outcome Evaluation:  Plan of Care Reviewed With: patient        Progress: no change  Outcome Evaluation: Pt resting well, gave prn pain meds for hip pain, no changes, will continue to monitor

## 2022-10-18 NOTE — PROGRESS NOTES
[facility] HOSPITALIST PROGRESS NOTE     Patient Identification:  Name:  Solitario Mcdaniel  Age:  71 y.o.  Sex:  male  :  1950  MRN:  [id number]  Visit Number:  [id number]  Primary Care Provider:  Corrine Mcdaniel APRN    Length of stay:  11    Chief complaint: Intermittent right hip pain    Subjective:    Patient continues to report mild right hip pain that is well controlled with current pain medication regimen.  He denies any new symptoms overnight.  Patient to have Hemovac discontinued today.  ----------------------------------------------------------------------------------------------------------------------  [facility] Meds:  amLODIPine, 10 mg, Oral, Daily  ertapenem, 1 g, Intravenous, Q24H  ferrous sulfate, 325 mg, Oral, Daily With Breakfast  heparin (porcine), 5,000 Units, Subcutaneous, Q8H  ketoconazole, , Topical, Daily  metoprolol tartrate, 25 mg, Oral, BID  nystatin-triamcinolone, 1 application, Topical, Q12H  polyethylene glycol, 17 g, Oral, Daily  prenatal vitamin 27-0.8, 1 tablet, Oral, Daily  sodium chloride, 10 mL, Intravenous, Q12H  sodium chloride, 10 mL, Intravenous, Q12H  sodium chloride, 10 mL, Intravenous, Q12H  tamsulosin, 0.4 mg, Oral, Daily  vancomycin, 125 mg, Oral, Q6H  vitamin D3, 5,000 Units, Oral, Daily      Pharmacy Consult,       ----------------------------------------------------------------------------------------------------------------------  Vital Signs:  Temp:  [97.9 °F (36.6 °C)-98.3 °F (36.8 °C)] 97.9 °F (36.6 °C)  Heart Rate:  [64-68] 68  Resp:  [18-20] 18  BP: (120-158)/(51-62) 134/51      10/07/22  1008 10/07/22  1622   Weight: 109 kg (240 lb 4.8 oz) 110 kg (242 lb 6.4 oz)     Body mass index is 42.94 kg/m².    Intake/Output Summary (Last 24 hours) at 10/18/2022 1611  Last data filed at 10/18/2022 1420  Gross per 24 hour   Intake 600 ml   Output 380 ml   Net 220 ml     Diet  Regular  ----------------------------------------------------------------------------------------------------------------------  Physical exam:  Constitutional: Well-nourished  male in no apparent distress.     HENT:  Head:  Normocephalic and atraumatic.  Mouth:  Moist mucous membranes.    Eyes:  Conjunctivae and EOM are normal.  Pupils are equal, round, and reactive to light.  No scleral icterus.    Neck:  Neck supple. No thyromegaly.  No JVD present.    Cardiovascular:  Regular rate and rhythm with no murmurs, rubs, clicks or gallops appreciated.  Pulmonary/Chest:  Clear to auscultation bilaterally with no crackles, wheezes or rhonchi appreciated.  Abdominal:  Soft. Nontender. Nondistended  Bowel sounds are normal in all four quadrants. No organomegally appreciated.   Musculoskeletal:  No edema, mild tenderness over right hip joint where surgical incision is located, no deformity.  No red or swollen joints anywhere.    Neurological:  Alert, Cranial nerves II-XII intact with no focal deficits.  No facial droop.  No slurred speech.   Skin:  Warm and dry to palpation with no rashes or lesions appreciated.  Peripheral vascular:  2+ radial and pedal pulses in bilateral upper and lower extremities.  Psychiatric:  Alert and oriented x3, demonstrates appropriate judgment and insight.    Essentially no change in physical exam in comparison to 10/18/2022  -------------------------------------------------------------------------------   ----------------------------------------------------------------------------------------------------------------------      Results from last 7 days   Lab Units 10/18/22  0301 10/16/22  1047 10/15/22  0454 10/14/22  0450 10/13/22  0930   CRP mg/dL 14.77* 16.29*  --   --  19.13*   LACTATE mmol/L  --   --   --   --  0.8   WBC 10*3/mm3 13.12*  --  12.74* 17.52*  --    HEMOGLOBIN g/dL 12.5*  --  12.1* 12.4*  --    HEMATOCRIT % 40.1  --  38.0 38.3  --    MCV fL 92.8  --  92.9 90.3  --     MCHC g/dL 31.2*  --  31.8 32.4  --    PLATELETS 10*3/mm3 392  --  321 319  --          Results from last 7 days   Lab Units 10/18/22  0301 10/15/22  0454 10/14/22  0450   SODIUM mmol/L 132* 132* 137   POTASSIUM mmol/L 4.7 4.5 4.1   CHLORIDE mmol/L 100 102 103   CO2 mmol/L 22.6 20.0* 23.2   BUN mg/dL 19 27* 30*   CREATININE mg/dL 1.03 1.16 1.30*   CALCIUM mg/dL 9.0 8.5* 8.9   GLUCOSE mg/dL 120* 115* 100*   Estimated Creatinine Clearance: 71.6 mL/min (by C-G formula based on SCr of 1.03 mg/dL).    No results found for: AMMONIA      Blood Culture   Date Value Ref Range Status   10/12/2022 No growth at 5 days  Final     No results found for: URINECX  Wound Culture   Date Value Ref Range Status   10/14/2022 No growth at 3 days  Final     No results found for: STOOLCX    I have personally looked at the labs and they are summarized above.  ----------------------------------------------------------------------------------------------------------------------  Imaging Results (Last 24 Hours)     ** No results found for the last 24 hours. **        ----------------------------------------------------------------------------------------------------------------------  Assessment and Plan:    1.  Sepsis - present on admission, secondary to Klebsiella pneumonia as well as Klebsiella pneumonia a bacteremia.  Now resolved.    2.  ESBL Klebsiella bacteremia -continue Invanz per infectious disease recommendations.    3.  ESBL Klebsiella acute cystitis -continue Invanz per infectious disease recommendations.    4.  Septic arthritis of right hip -status post joint aspiration, culture with ESBL Klebsiella.  Continue Invanz for now.    5.  C. difficile colitis -continue oral vancomycin for now, patient now reporting normal and formed bowel movements today.  Will complete a full 10 day treatment.    6.  Essential hypertension -well-controlled, continue to monitor closely and make medication adjustments as necessary    Disposition  currently pending rehab placement    Hans Avilez, DO   10/18/22   16:11 EDT

## 2022-10-18 NOTE — PLAN OF CARE
Goal Outcome Evaluation:  Plan of Care Reviewed With: patient           Outcome Evaluation: Pt complains of pain, PRN meds given. Hemovac drain removed. Wound are completed. Will continue plan of care.

## 2022-10-18 NOTE — CASE MANAGEMENT/SOCIAL WORK
Discharge Planning [facility]     Patient Name: Solitario Mcdaniel  MRN: [id number]  Today's Date: 10/18/2022    Admit Date: 10/7/2022    [facility] SS to follow.     Discharge Plan     Row Name 10/18/22 1851       Plan    Plan [facility] is reviewing pt's referral. SS to follow.              Continued Care and Services - Admitted Since 10/7/2022     Destination     Service Provider Request Status Selected Services Address Phone Fax Patient Preferred    [facility] Pending - Request Sent N/A [address] [place] [phone] --    [facility]  Medicare Part A only N/A [address] [facility] [place] [address] [phone] --              Expected Discharge Date and Time     Expected Discharge Date Expected Discharge Time    Oct 19, 2022         TYSON Benitez

## 2022-10-18 NOTE — PROGRESS NOTES
PROGRESS NOTE         Patient Identification:  Name:  Solitario Mcdaniel  Age:  71 y.o.  Sex:  male  :  1950  MRN:  [id number]  Visit Number:  [id number]  Primary Care Provider:  Corrine Mcdaniel APRN         LOS: 11 days       ----------------------------------------------------------------------------------------------------------------------  Subjective       Chief Complaints:    Hip Pain        Interval History:      Patient resting in bed this morning.  Right hip edema and pain to palpation.  Primary RNDiane reports right hip incision healing well, 15 mL drain from Hemovac overnight.  1 solid bowel movement overnight.  WBC elevated at 13.12.  Afebrile.  Currently on 2 L per nasal cannula with no apparent distress.    Review of Systems:    Constitutional: no fever, chills and night sweats. No appetite change or unexpected weight change.  Fatigue and generalized weakness.  Eyes: no eye drainage, itching or redness.  HEENT: no mouth sores, dysphagia or nose bleed.  Respiratory: no for shortness of breath, cough or production of sputum.  Cardiovascular: no chest pain, no palpitations, no orthopnea.   Gastrointestinal: no nausea, vomiting. No diarrhea. No abdominal pain, hematemesis or rectal bleeding.  Genitourinary: no dysuria or polyuria.  Hematologic/lymphatic: no lymph node abnormalities, no easy bruising or easy bleeding.  Musculoskeletal:  Right hip pain.  Skin: No rash and no itching.  Neurological: no loss of consciousness, no seizure, no headache.  Chronic right upper extremity weakness.  Behavioral/Psych: no depression or suicidal ideation.  Endocrine: no hot flashes.  Immunologic: negative.      ----------------------------------------------------------------------------------------------------------------------      Objective       Current [facility] Meds:  amLODIPine, 10 mg, Oral, Daily  ertapenem, 1 g, Intravenous, Q24H  ferrous sulfate, 325 mg, Oral, Daily With  Breakfast  heparin (porcine), 5,000 Units, Subcutaneous, Q8H  ketoconazole, , Topical, Daily  metoprolol tartrate, 25 mg, Oral, BID  polyethylene glycol, 17 g, Oral, Daily  prenatal vitamin 27-0.8, 1 tablet, Oral, Daily  sodium chloride, 10 mL, Intravenous, Q12H  sodium chloride, 10 mL, Intravenous, Q12H  sodium chloride, 10 mL, Intravenous, Q12H  tamsulosin, 0.4 mg, Oral, Daily  vancomycin, 125 mg, Oral, Q6H  vitamin D3, 5,000 Units, Oral, Daily      Pharmacy Consult,       ----------------------------------------------------------------------------------------------------------------------    Vital Signs:  Temp:  [97.9 °F (36.6 °C)-98.3 °F (36.8 °C)] 97.9 °F (36.6 °C)  Heart Rate:  [64-67] 64  Resp:  [16-20] 20  BP: (120-158)/(58-62) 158/62  No data found.  SpO2 Percentage    10/17/22 1400 10/17/22 1904 10/18/22 0300   SpO2: 93% 98% 95%     SpO2:  [93 %-98 %] 95 %  on  Flow (L/min):  [2] 2;   Device (Oxygen Therapy): nasal cannula    Body mass index is 42.94 kg/m².  Wt Readings from Last 3 Encounters:   10/07/22 110 kg (242 lb 6.4 oz)   10/06/22 109 kg (240 lb)   06/17/21 118 kg (260 lb)        Intake/Output Summary (Last 24 hours) at 10/18/2022 0950  Last data filed at 10/18/2022 0600  Gross per 24 hour   Intake 840 ml   Output 1110 ml   Net -270 ml     Diet Regular  ----------------------------------------------------------------------------------------------------------------------      Physical Exam:    Constitutional:  male lying in bed on room air with no apparent distress.  HENT:  Head: Normocephalic and atraumatic.  Mouth:  Moist mucous membranes.  Poor dentition.  Eyes:  Conjunctivae and EOM are normal.  No scleral icterus.  Neck:  Neck supple.  No JVD present.    Cardiovascular:  Normal rate, regular rhythm and normal heart sounds with no murmur. No edema.  Pulmonary/Chest:  No respiratory distress, no wheezes, no crackles, with normal breath sounds and good air movement.  Abdominal:  Soft.   Bowel sounds are normal.  No distension and no tenderness. Large hernia of the left sided abdomen.  Chronic indwelling Martin catheter with clear yellow urine in collection bag.  Musculoskeletal:  No tenderness and no deformity.  No swelling or redness of joints.  1+ edema of bilateral lower extremities with venous stasis changes.  Right hip pain with movement, and edema. Hemovac drain in place with serosanguineous drainage.  Neurological:  Alert and oriented to person, place, and time.  No facial droop.  No slurred speech.   Right upper extremity chronic weakness.  Skin:  Skin is warm and dry.  No rash noted.  No pallor.  Venous stasis changes of bilateral lower extremities. Seborrhoeic dermatitis to face.   Psychiatric:  Normal mood and affect.  Behavior is normal.       ----------------------------------------------------------------------------------------------------------------------            Results from last 7 days   Lab Units 10/18/22  0301 10/16/22  1047 10/15/22  0454 10/14/22  0450 10/13/22  0930   CRP mg/dL  --  16.29*  --   --  19.13*   LACTATE mmol/L  --   --   --   --  0.8   WBC 10*3/mm3 13.12*  --  12.74* 17.52*  --    HEMOGLOBIN g/dL 12.5*  --  12.1* 12.4*  --    HEMATOCRIT % 40.1  --  38.0 38.3  --    MCV fL 92.8  --  92.9 90.3  --    MCHC g/dL 31.2*  --  31.8 32.4  --    PLATELETS 10*3/mm3 392  --  321 319  --      Results from last 7 days   Lab Units 10/18/22  0301 10/15/22  0454 10/14/22  0450   SODIUM mmol/L 132* 132* 137   POTASSIUM mmol/L 4.7 4.5 4.1   CHLORIDE mmol/L 100 102 103   CO2 mmol/L 22.6 20.0* 23.2   BUN mg/dL 19 27* 30*   CREATININE mg/dL 1.03 1.16 1.30*   CALCIUM mg/dL 9.0 8.5* 8.9   GLUCOSE mg/dL 120* 115* 100*   Estimated Creatinine Clearance: 71.6 mL/min (by C-G formula based on SCr of 1.03 mg/dL).  No results found for: AMMONIA    No results found for: HGBA1C, POCGLU  Lab Results   Component Value Date    HGBA1C 5.60 10/15/2022     No results found for: TSH, FREET4    Blood  Culture   Date Value Ref Range Status   10/09/2022 Abnormal Stain (C)  Preliminary   10/09/2022 Abnormal Stain (C)  Preliminary   10/07/2022 Klebsiella pneumoniae ESBL (C)  Final     Comment:       Consider infectious disease consult.  Susceptibility results may not correlate to clinical outcomes.  For ESBL-producing infections in the blood, a carbapenem is recommended as first-line therapy for optimal clinical outcomes.   10/07/2022 Klebsiella pneumoniae ESBL (C)  Final     Comment:       Consider infectious disease consult.  Susceptibility results may not correlate to clinical outcomes.  For ESBL-producing infections in the blood, a carbapenem is recommended as first-line therapy for optimal clinical outcomes.     Urine Culture   Date Value Ref Range Status   10/07/2022 >100,000 CFU/mL Klebsiella pneumoniae ESBL (A)  Final     Comment:       Consider infectious disease consult.  Susceptibility results may not correlate to clinical outcomes.     No results found for: WOUNDCX  No results found for: STOOLCX  No results found for: RESPCX  Pain Management Panel    There is no flowsheet data to display.           ----------------------------------------------------------------------------------------------------------------------  Imaging Results (Last 24 Hours)     ** No results found for the last 24 hours. **          --------------------------------------------------------------------------------------    Pertinent Infectious Disease Results     Urinalysis on 10/7/2022 was positive with 31-50 WBCs and 4+ bacteria.  Urine culture finalized as greater than 100,000 colonies of ESBL E. coli.  Chest x-ray on 10/7/2022 showed cardiomegaly.  Coarsened interstitial markings noted throughout the lungs.  X-ray of the right hip on 10/6/2022 showed very advanced right hip osteoarthritic change and possibly chronic AVN with deformity of the femoral head contour.  CT pelvis on 10/6/2022 showed there is a large anterior abdominal  wall ventral hernia containing nondilated large and small bowel.  Again there is marked deformity of the right humeral head likely related to chronic AVN or advanced osteoarthritis.  No fracture line is evident.  MRI of the right hip on 10/7/2022 showed again noted are fairly advanced changes of osteoarthritic change and joint space collapse of the right hip.  Subchondral cystic change of both the femoral head and acetabulum are noted.  While no characteristic findings of AVN are noted this may represent sequelae of chronic AVN and subcortical collapse.  Small right hip joint effusion that could be reactive secondary to advanced osteoarthritic change.  In setting of fever and hip pain chronic septic arthritis could also be considered but the morphologic alteration of the joint do not appear to be acute.  COVID-19 and flu A/B PCR on 10/7/2022 was negative.  Blood cultures on 10/7/2022 finalized as ESBL Klebsiella pneumoniae.  C. difficile toxin PCR was positive although antigen was negative.      Assessment/Plan         ASSESSMENT:    Sepsis present on admission  ESBL Klebsiella pneumoniae bacteremia  ESBL Klebsiella pneumoniae complicated UTI      PLAN:    I saw and examined the patient myself this morning with KANDICE Ordoñez and got report from primary RN and here are my findings:    Patient resting comfortably with no acute distress continues to have right hip pain but diarrhea has resolved.    Patient has 15 cc drain in the Hemovac with the possibility of Hemovac removal.    Right hip remains edematous but no erythema and significantly tender to touch.  Lungs clear to auscultation with no crackles or rhonchi and abdomen is soft with no tenderness.  Patient has a fairly severe case of seborrheic dermatitis to his face.    Laboratory wise WBC is elevated at 13.12 currently on nasal cannula with no evidence of acute distress currently on ertapenem and oral vancomycin.    I initiated nystatin triamcinolone cream  to apply to the face every 12 hours x7 days for fairly severe seborrheic dermatitis.      Code Status:   Code Status and Medical Interventions:   Ordered at: 10/07/22 1416     Code Status (Patient has no pulse and is not breathing):    CPR (Attempt to Resuscitate)     Medical Interventions (Patient has pulse or is breathing):    Full Support       KANDICE Ordoñez  10/18/22  09:50 EDT    Electronically signed by KANDICE Ordoñez, 10/18/22, 9:52 AM EDT.  Electronically signed by Olena Ramsay MD, 10/18/22, 10:51 AM EDT.

## 2022-10-18 NOTE — PROGRESS NOTES
Inpatient Progress Note  Solitario Mcdaniel  Date: 10/18/22  MRN: [id number]      Subjective:   Patient is postop day #3 status post right hip irrigation debridement.  Patient had 15 cc of drainage noted overnight to the Hemovac drain.  Bloody in nature.  Patient does note some intermittent pain to the right hip.  Patient continues to not mobilize well with physical therapy.  The patient has no fever on exam today.  Infectious disease is following for IV antibiotic recommendations.  Patient CRP is continuing to trend down.        Objective:    Vitals:    10/17/22 1400 10/17/22 1904 10/18/22 0300 10/18/22 0600   BP: 143/59 142/60 120/58 158/62   BP Location: Right arm Right arm Right arm Right arm   Patient Position: Lying Lying Lying Lying   Pulse: 65 67 65 64   Resp: 16 18 20 20   Temp: 98.3 °F (36.8 °C) 98.3 °F (36.8 °C) 98.2 °F (36.8 °C) 97.9 °F (36.6 °C)   TempSrc: Oral Oral Oral Oral   SpO2: 93% 98% 95%    Weight:       Height:              Physical Exam:  Constitutional: Chronically ill male.  No respiratory distress.         Musculoskeletal: Upon examination of the right hip the surgical incision is healing well.  No active drainage noted.  Hemovac drain in place.  Bloody drainage noted to the Hemovac.  Minimal drainage noted on exam in the Hemovac.  Neurovascularly intact right lower extremity.  No diffuse cellulitis of the right hip noted.  Dorsiflexion plantarflexion intact right ankle.  No foot drop abnormality is noted.      Labs:    Results from last 7 days   Lab Units 10/18/22  0301 10/16/22  1047 10/15/22  0454 10/14/22  0450 10/13/22  0930   CRP mg/dL 14.77* 16.29*  --   --  19.13*   LACTATE mmol/L  --   --   --   --  0.8   WBC 10*3/mm3 13.12*  --  12.74* 17.52*  --    HEMOGLOBIN g/dL 12.5*  --  12.1* 12.4*  --    HEMATOCRIT % 40.1  --  38.0 38.3  --    MCV fL 92.8  --  92.9 90.3  --    MCHC g/dL 31.2*  --  31.8 32.4  --    PLATELETS 10*3/mm3 392  --  321 319  --          Results from last 7 days   Lab  Units 10/18/22  0301 10/15/22  0454 10/14/22  0450   SODIUM mmol/L 132* 132* 137   POTASSIUM mmol/L 4.7 4.5 4.1   CHLORIDE mmol/L 100 102 103   CO2 mmol/L 22.6 20.0* 23.2   BUN mg/dL 19 27* 30*   CREATININE mg/dL 1.03 1.16 1.30*   CALCIUM mg/dL 9.0 8.5* 8.9   GLUCOSE mg/dL 120* 115* 100*   Estimated Creatinine Clearance: 71.6 mL/min (by C-G formula based on SCr of 1.03 mg/dL).  No results found for: AMMONIA          No results found for: HGBA1C  No results found for: TSH, FREET4      Pain Management Panel    There is no flowsheet data to display.         Blood Culture   Date Value Ref Range Status   10/12/2022 No growth at 5 days  Final     No results found for: URINECX  Wound Culture   Date Value Ref Range Status   10/14/2022 No growth at 3 days  Final     No results found for: STOOLCX              Radiology:  Imaging Results (Last 72 Hours)     ** No results found for the last 72 hours. **            Assessment:   #1 status post right hip irrigation and debridement          Plan:   IV antibiotics per infectious disease recommendations.  Hemovac will be discontinued today.  Daily island dressing change to right hip with utilization of Betadine.  Orthopedic surgery will continue to follow.  CRP is trending down.  Recommend continuing to trend CRP.  Weight-bear as tolerated right lower extremity.        Link Fowler, APRN October 18, 2022 13:21 EDT

## 2022-10-19 LAB
BACTERIA SPEC ANAEROBE CULT: NORMAL
DEPRECATED RDW RBC AUTO: 47.8 FL (ref 37–54)
ERYTHROCYTE [DISTWIDTH] IN BLOOD BY AUTOMATED COUNT: 14.8 % (ref 12.3–15.4)
HCT VFR BLD AUTO: 37.1 % (ref 37.5–51)
HGB BLD-MCNC: 12.2 G/DL (ref 13–17.7)
MCH RBC QN AUTO: 29.1 PG (ref 26.6–33)
MCHC RBC AUTO-ENTMCNC: 32.9 G/DL (ref 31.5–35.7)
MCV RBC AUTO: 88.5 FL (ref 79–97)
PLATELET # BLD AUTO: 483 10*3/MM3 (ref 140–450)
PMV BLD AUTO: 9.2 FL (ref 6–12)
RBC # BLD AUTO: 4.19 10*6/MM3 (ref 4.14–5.8)
WBC NRBC COR # BLD: 13.03 10*3/MM3 (ref 3.4–10.8)

## 2022-10-19 PROCEDURE — 85027 COMPLETE CBC AUTOMATED: CPT | Performed by: INTERNAL MEDICINE

## 2022-10-19 PROCEDURE — 97110 THERAPEUTIC EXERCISES: CPT

## 2022-10-19 PROCEDURE — 99232 SBSQ HOSP IP/OBS MODERATE 35: CPT | Performed by: INTERNAL MEDICINE

## 2022-10-19 PROCEDURE — 97530 THERAPEUTIC ACTIVITIES: CPT

## 2022-10-19 PROCEDURE — 99233 SBSQ HOSP IP/OBS HIGH 50: CPT | Performed by: INTERNAL MEDICINE

## 2022-10-19 PROCEDURE — 25010000002 HEPARIN (PORCINE) PER 1000 UNITS: Performed by: ORTHOPAEDIC SURGERY

## 2022-10-19 PROCEDURE — 25010000002 ERTAPENEM PER 500 MG: Performed by: NURSE PRACTITIONER

## 2022-10-19 RX ADMIN — ERTAPENEM SODIUM 1 G: 1 INJECTION, POWDER, LYOPHILIZED, FOR SOLUTION INTRAMUSCULAR; INTRAVENOUS at 11:49

## 2022-10-19 RX ADMIN — Medication 5000 UNITS: at 08:08

## 2022-10-19 RX ADMIN — HYDROCODONE BITARTRATE AND ACETAMINOPHEN 1 TABLET: 7.5; 325 TABLET ORAL at 08:11

## 2022-10-19 RX ADMIN — HEPARIN SODIUM 5000 UNITS: 5000 INJECTION INTRAVENOUS; SUBCUTANEOUS at 06:29

## 2022-10-19 RX ADMIN — Medication 10 ML: at 21:15

## 2022-10-19 RX ADMIN — VANCOMYCIN HYDROCHLORIDE 125 MG: 125 CAPSULE ORAL at 16:24

## 2022-10-19 RX ADMIN — PRENATAL VIT W/ FE FUMARATE-FA TAB 27-0.8 MG 1 TABLET: 27-0.8 TAB at 08:08

## 2022-10-19 RX ADMIN — KETOCONAZOLE: 20 SHAMPOO, SUSPENSION TOPICAL at 08:08

## 2022-10-19 RX ADMIN — VANCOMYCIN HYDROCHLORIDE 125 MG: 125 CAPSULE ORAL at 00:38

## 2022-10-19 RX ADMIN — Medication 10 ML: at 21:14

## 2022-10-19 RX ADMIN — FERROUS SULFATE TAB 325 MG (65 MG ELEMENTAL FE) 325 MG: 325 (65 FE) TAB at 08:08

## 2022-10-19 RX ADMIN — CYCLOBENZAPRINE 5 MG: 10 TABLET, FILM COATED ORAL at 01:47

## 2022-10-19 RX ADMIN — METOPROLOL TARTRATE 25 MG: 25 TABLET, FILM COATED ORAL at 08:08

## 2022-10-19 RX ADMIN — VANCOMYCIN HYDROCHLORIDE 125 MG: 125 CAPSULE ORAL at 23:33

## 2022-10-19 RX ADMIN — TAMSULOSIN HYDROCHLORIDE 0.4 MG: 0.4 CAPSULE ORAL at 08:08

## 2022-10-19 RX ADMIN — AMLODIPINE BESYLATE 10 MG: 10 TABLET ORAL at 08:08

## 2022-10-19 RX ADMIN — METOPROLOL TARTRATE 25 MG: 25 TABLET, FILM COATED ORAL at 21:14

## 2022-10-19 RX ADMIN — VANCOMYCIN HYDROCHLORIDE 125 MG: 125 CAPSULE ORAL at 06:29

## 2022-10-19 RX ADMIN — HYDROCODONE BITARTRATE AND ACETAMINOPHEN 1 TABLET: 7.5; 325 TABLET ORAL at 21:21

## 2022-10-19 RX ADMIN — HEPARIN SODIUM 5000 UNITS: 5000 INJECTION INTRAVENOUS; SUBCUTANEOUS at 13:06

## 2022-10-19 RX ADMIN — HEPARIN SODIUM 5000 UNITS: 5000 INJECTION INTRAVENOUS; SUBCUTANEOUS at 21:14

## 2022-10-19 RX ADMIN — VANCOMYCIN HYDROCHLORIDE 125 MG: 125 CAPSULE ORAL at 11:49

## 2022-10-19 RX ADMIN — NYSTATIN AND TRIAMCINOLONE ACETONIDE 1 APPLICATION: 100000; 1 CREAM TOPICAL at 21:14

## 2022-10-19 RX ADMIN — NYSTATIN AND TRIAMCINOLONE ACETONIDE 1 APPLICATION: 100000; 1 CREAM TOPICAL at 08:08

## 2022-10-19 NOTE — PLAN OF CARE
Goal Outcome Evaluation:         Pt resting with no complaints at this time. Prn pain meds given this shift.

## 2022-10-19 NOTE — PROGRESS NOTES
[facility] HOSPITALIST PROGRESS NOTE     Patient Identification:  Name:  Solitario Mcdaniel  Age:  71 y.o.  Sex:  male  :  1950  MRN:  [id number]  Visit Number:  [id number]  Primary Care Provider:  Corrine Mcdaniel APRN    Length of stay:  12    Chief complaint: Intermittent right hip pain    Subjective:    Patient reports doing well today with no new complaints.  Hemovac was removed yesterday.  Still with intermittent twinges of pain in his right hip especially with certain movements.  No new events overnight.  ----------------------------------------------------------------------------------------------------------------------  [facility] Meds:  amLODIPine, 10 mg, Oral, Daily  ertapenem, 1 g, Intravenous, Q24H  ferrous sulfate, 325 mg, Oral, Daily With Breakfast  heparin (porcine), 5,000 Units, Subcutaneous, Q8H  metoprolol tartrate, 25 mg, Oral, BID  nystatin-triamcinolone, 1 application, Topical, Q12H  polyethylene glycol, 17 g, Oral, Daily  prenatal vitamin 27-0.8, 1 tablet, Oral, Daily  sodium chloride, 10 mL, Intravenous, Q12H  sodium chloride, 10 mL, Intravenous, Q12H  sodium chloride, 10 mL, Intravenous, Q12H  tamsulosin, 0.4 mg, Oral, Daily  vancomycin, 125 mg, Oral, Q6H  vitamin D3, 5,000 Units, Oral, Daily      Pharmacy Consult,       ----------------------------------------------------------------------------------------------------------------------  Vital Signs:  Temp:  [98 °F (36.7 °C)-98.3 °F (36.8 °C)] 98.3 °F (36.8 °C)  Heart Rate:  [64-79] 79  Resp:  [16-18] 18  BP: (125-156)/(51-61) 156/58      10/07/22  1008 10/07/22  1622   Weight: 109 kg (240 lb 4.8 oz) 110 kg (242 lb 6.4 oz)     Body mass index is 42.94 kg/m².    Intake/Output Summary (Last 24 hours) at 10/19/2022 1216  Last data filed at 10/19/2022 0700  Gross per 24 hour   Intake 1220 ml   Output 795 ml   Net 425 ml     Diet  Regular  ----------------------------------------------------------------------------------------------------------------------  Physical exam:  Constitutional: Well-nourished  male in no apparent distress.     HENT:  Head:  Normocephalic and atraumatic.  Mouth:  Moist mucous membranes.    Eyes:  Conjunctivae and EOM are normal.  Pupils are equal, round, and reactive to light.  No scleral icterus.    Neck:  Neck supple. No thyromegaly.  No JVD present.    Cardiovascular:  Regular rate and rhythm with no murmurs, rubs, clicks or gallops appreciated.  Pulmonary/Chest:  Clear to auscultation bilaterally with no crackles, wheezes or rhonchi appreciated.  Abdominal:  Soft. Nontender. Nondistended  Bowel sounds are normal in all four quadrants. No organomegally appreciated.   Musculoskeletal:  No edema, mild tenderness over right hip joint where surgical incision is located, no deformity.  No red or swollen joints anywhere.    Neurological:  Alert, Cranial nerves II-XII intact with no focal deficits.  No facial droop.  No slurred speech.   Skin:  Warm and dry to palpation with no rashes or lesions appreciated.  Peripheral vascular:  2+ radial and pedal pulses in bilateral upper and lower extremities.  Psychiatric:  Alert and oriented x3, demonstrates appropriate judgment and insight.    Essentially no change in physical exam in comparison to 10/18/2022  -------------------------------------------------------------------------------   ----------------------------------------------------------------------------------------------------------------------      Results from last 7 days   Lab Units 10/19/22  0149 10/18/22  0301 10/16/22  1047 10/15/22  0454 10/14/22  0450 10/13/22  0930   CRP mg/dL  --  14.77* 16.29*  --   --  19.13*   LACTATE mmol/L  --   --   --   --   --  0.8   WBC 10*3/mm3 13.03* 13.12*  --  12.74*   < >  --    HEMOGLOBIN g/dL 12.2* 12.5*  --  12.1*   < >  --    HEMATOCRIT % 37.1* 40.1  --  38.0   <  >  --    MCV fL 88.5 92.8  --  92.9   < >  --    MCHC g/dL 32.9 31.2*  --  31.8   < >  --    PLATELETS 10*3/mm3 483* 392  --  321   < >  --     < > = values in this interval not displayed.         Results from last 7 days   Lab Units 10/18/22  0301 10/15/22  0454 10/14/22  0450   SODIUM mmol/L 132* 132* 137   POTASSIUM mmol/L 4.7 4.5 4.1   CHLORIDE mmol/L 100 102 103   CO2 mmol/L 22.6 20.0* 23.2   BUN mg/dL 19 27* 30*   CREATININE mg/dL 1.03 1.16 1.30*   CALCIUM mg/dL 9.0 8.5* 8.9   GLUCOSE mg/dL 120* 115* 100*   Estimated Creatinine Clearance: 71.6 mL/min (by C-G formula based on SCr of 1.03 mg/dL).    No results found for: AMMONIA      Blood Culture   Date Value Ref Range Status   10/12/2022 No growth at 5 days  Final     No results found for: URINECX  Wound Culture   Date Value Ref Range Status   10/14/2022 No growth at 3 days  Final     No results found for: STOOLCX    I have personally looked at the labs and they are summarized above.  ----------------------------------------------------------------------------------------------------------------------  Imaging Results (Last 24 Hours)     ** No results found for the last 24 hours. **        ----------------------------------------------------------------------------------------------------------------------  Assessment and Plan:    1.  Sepsis - present on admission, secondary to Klebsiella pneumonia as well as Klebsiella pneumonia a bacteremia.  Now resolved.    2.  ESBL Klebsiella bacteremia -continue Invanz per infectious disease recommendations.    3.  ESBL Klebsiella acute cystitis -continue Invanz per infectious disease recommendations.    4.  Septic arthritis of right hip -status post joint aspiration, culture with ESBL Klebsiella.  Continue Invanz for now.  Note is made of yeast on intraoperative culture.  However, patient has improved without fungal coverage.  We will continue to monitor closely for now.    5.  C. difficile colitis -continue oral  vancomycin for now, patient now reporting normal and formed bowel movements today.  Will complete a full 10 day treatment.    6.  Essential hypertension -well-controlled, continue to monitor closely and make medication adjustments as necessary    Disposition currently pending rehab placement    Hans Avilez,    10/19/22   12:16 EDT

## 2022-10-19 NOTE — THERAPY TREATMENT NOTE
Acute Care - Physical Therapy Treatment Note   [place]     Patient Name: Solitario Mcdaniel  : 1950  MRN: [id number]  Today's Date: 10/19/2022   Onset of Illness/Injury or Date of Surgery: 10/07/22  Visit Dx:     ICD-10-CM ICD-9-CM   1. Septic hip (HCC)  M00.9 711.05     Patient Active Problem List   Diagnosis   • Septic hip (HCC)     Past Medical History:   Diagnosis Date   • Arthritis    • BPH (benign prostatic hyperplasia)    • Hypertension    • Osteoarthritis      Past Surgical History:   Procedure Laterality Date   • COLONOSCOPY     • INCISION AND DRAINAGE HIP Right 10/14/2022    Procedure: HIP INCISION AND DRAINAGE;  Surgeon: Chris Alberto DO;  Location: [facility];  Service: Orthopedics;  Laterality: Right;   • TONSILLECTOMY     • VASECTOMY       PT Assessment (last 12 hours)     PT Evaluation and Treatment     Row Name 10/19/22 1400          Physical Therapy Time and Intention    Subjective Information complains of;pain  -KM     Document Type therapy note (daily note)  -KM     Mode of Treatment individual therapy;physical therapy  -KM     Patient Effort good  -KM     Symptoms Noted During/After Treatment increased pain  -KM     Row Name 10/19/22 1400          General Information    Patient Profile Reviewed yes  -KM     Patient Observations alert;cooperative;agree to therapy  -KM     Existing Precautions/Restrictions fall;weight bearing  RLE WBAT  -KM     Row Name 10/19/22 1400          Cognition    Affect/Mental Status (Cognition) WFL  -KM     Follows Commands (Cognition) follows one-step commands  -KM     Row Name 10/19/22 1400          Bed Mobility    Bed Mobility supine-sit-supine  -KM     Supine-Sit-Supine [place] (Bed Mobility) maximum assist (25% patient effort)  -KM     Comment, (Bed Mobility) Pt. had to return to supine from sitting d/t pain in RLE  -KM     Row Name 10/19/22 1400          Motor Skills    Comments, Therapeutic Exercise Supine ther-ex  -KM     Additional Documentation  Comments, Therapeutic Exercise (Row)  -KM     Row Name             Wound 10/10/22 1500 Right anterior hip Incision    Wound - Properties Group Placement Date: 10/10/22  -VL Placement Time: 1500  -VL Present on Hospital Admission: N  -VL Side: Right  -VL Orientation: anterior  -VL Location: hip  -VL Primary Wound Type: Incision  -VL Additional Comments: From Joint Aspiration on 10/10/2022  -VL    Retired Wound - Properties Group Placement Date: 10/10/22  -VL Placement Time: 1500  -VL Present on Hospital Admission: N  -VL Side: Right  -VL Orientation: anterior  -VL Location: hip  -VL Primary Wound Type: Incision  -VL Additional Comments: From Joint Aspiration on 10/10/2022  -VL    Retired Wound - Properties Group Date first assessed: 10/10/22  -VL Time first assessed: 1500  -VL Present on Hospital Admission: N  -VL Side: Right  -VL Location: hip  -VL Primary Wound Type: Incision  -VL Additional Comments: From Joint Aspiration on 10/10/2022  -VL    Row Name             Wound 10/14/22 0815 Right anterior greater trochanter Incision    Wound - Properties Group Placement Date: 10/14/22  -LC Placement Time: 0815  -LC Present on Hospital Admission: N  -LC Side: Right  -LC Orientation: anterior  -LC Location: greater trochanter  -LC Primary Wound Type: Incision  -LC    Retired Wound - Properties Group Placement Date: 10/14/22  -LC Placement Time: 0815  -LC Present on Hospital Admission: N  -LC Side: Right  -LC Orientation: anterior  -LC Location: greater trochanter  -LC Primary Wound Type: Incision  -LC    Retired Wound - Properties Group Date first assessed: 10/14/22  -LC Time first assessed: 0815  -LC Present on Hospital Admission: N  -LC Side: Right  -LC Location: greater trochanter  -LC Primary Wound Type: Incision  -LC    Row Name 10/19/22 1400          Progress Summary (PT)    Progress Toward Functional Goals (PT) progress toward functional goals is gradual  -KM     Daily Progress Summary (PT) Pt. was able to perform  functional mobility skills w/ maxA. He had an increase in pain in RLE and asked to return to supine. Pt. tolerated supine ther-ex w/ minor increases in pain. Pt. would continue to benefit from skilled PT services.  -           User Key  (r) = Recorded By, (t) = Taken By, (c) = Cosigned By    Initials Name Provider Type    Uma Jeff, RN Registered Nurse    Fernando Douglass, BRICE Physical Therapist    Almaz Leong, RN Registered Nurse                  PT Recommendation and Plan     Progress Summary (PT)  Progress Toward Functional Goals (PT): progress toward functional goals is gradual  Daily Progress Summary (PT): Pt. was able to perform functional mobility skills w/ maxA. He had an increase in pain in RLE and asked to return to supine. Pt. tolerated supine ther-ex w/ minor increases in pain. Pt. would continue to benefit from skilled PT services.       Time Calculation:    PT Charges     Row Name 10/19/22 1430             Time Calculation    PT Received On 10/19/22  -            User Key  (r) = Recorded By, (t) = Taken By, (c) = Cosigned By    Initials Name Provider Type    Fernando Douglass, PT Physical Therapist              Therapy Charges for Today     Code Description Service Date Service Provider Modifiers Qty    19949182408 HC PT THERAPEUTIC ACT EA 15 MIN 10/19/2022 Fernando Fagan, PT GP 1    50085939730 HC PT THER PROC EA 15 MIN 10/19/2022 Fernando Fagan, PT GP 1          PT G-Codes  AM-PAC 6 Clicks Score (PT): 7    Fernando Fagan PT  10/19/2022

## 2022-10-19 NOTE — CASE MANAGEMENT/SOCIAL WORK
Discharge Planning Assessment  [facility]     Patient Name: Solitario Mcdaniel  MRN: [id number]  Today's Date: 10/19/2022    Admit Date: 10/7/2022    Plan: SS spoke to [facility] [place] per Christy and pt has a bed available. [facility] request Covid vaccination status. SS contacted pt's daughter, Maryann who states pt has not been Covid vaccinated. Pt's daughter continues to be agreeable for pt to be discharged to [facility]. SS notified Christy. SS notified Dr. Avilez that pt will have a bed available tomorrow at [facility]. SS to follow.     Discharge Plan     Row Name 10/19/22 0181       Plan    Plan SS spoke to [facility] per Christy and pt has a bed available. [facility]& request Covid vaccination status. SS contacted pt's daughter, Maryann who states pt has not been Covid vaccinated. Pt's daughter continues to be agreeable for pt to be discharged to [facility]. SS notified Christy. SS notified Dr. Avilez that pt will have a bed available tomorrow at [facility]. SS to follow.              Continued Care and Services - Admitted Since 10/7/2022     Destination     Service Provider Request Status Selected Services Address Phone Fax Patient Preferred    [facility] Pending - Request Sent N/A [address] [place] [id number] [phone] --    [facility]  Medicare Part A only N/A [address] [facility] [place] [address] [phone] --              Expected Discharge Date and Time     Expected Discharge Date Expected Discharge Time    Oct 20, 2022         TYSON Benitez

## 2022-10-19 NOTE — PROGRESS NOTES
Inpatient Progress Note  Solitario Mcdaniel  Date: 10/19/22  MRN: [id number]      Subjective:   Patient is postop day #4 status post right hip irrigation debridement.  Hemovac discontinued yesterday.  Infectious disease is following the patient.  Intraoperative culture did come back positive for yeast.  Patient is afebrile on exam.  Pain is controlled.  Continues to have difficulty mobilization with physical therapy.  Hemoglobin and white blood cell count is stable.  CRP has been trending down.        Objective:    Vitals:    10/18/22 1837 10/19/22 0216 10/19/22 0600 10/19/22 1400   BP: 125/61 130/59 156/58 135/60   BP Location: Right arm Right arm Right arm Right arm   Patient Position: Lying Lying Lying Lying   Pulse: 67 64 79 68   Resp: 16 18 18 18   Temp: 98.3 °F (36.8 °C) 98 °F (36.7 °C) 98.3 °F (36.8 °C) 98.6 °F (37 °C)   TempSrc: Oral Oral Oral Oral   SpO2: 97% 98%     Weight:       Height:              Physical Exam:  Constitutional: Chronically ill male.  No respiratory distress.        Musculoskeletal: Upon examination of the right hip no active drainage is noted.  Surgical incision is healing well.  No cellulitis noted.  Neurovascularly intact right lower extremity.  Dorsiflexion plantarflexion intact right ankle.  No foot drop abnormality is noted.  Positive pedal pulses noted right lower extremity.      Labs:    Results from last 7 days   Lab Units 10/19/22  0149 10/18/22  0301 10/16/22  1047 10/15/22  0454 10/14/22  0450 10/13/22  0930   CRP mg/dL  --  14.77* 16.29*  --   --  19.13*   LACTATE mmol/L  --   --   --   --   --  0.8   WBC 10*3/mm3 13.03* 13.12*  --  12.74*   < >  --    HEMOGLOBIN g/dL 12.2* 12.5*  --  12.1*   < >  --    HEMATOCRIT % 37.1* 40.1  --  38.0   < >  --    MCV fL 88.5 92.8  --  92.9   < >  --    MCHC g/dL 32.9 31.2*  --  31.8   < >  --    PLATELETS 10*3/mm3 483* 392  --  321   < >  --     < > = values in this interval not displayed.         Results from last 7 days   Lab Units  10/18/22  0301 10/15/22  0454 10/14/22  0450   SODIUM mmol/L 132* 132* 137   POTASSIUM mmol/L 4.7 4.5 4.1   CHLORIDE mmol/L 100 102 103   CO2 mmol/L 22.6 20.0* 23.2   BUN mg/dL 19 27* 30*   CREATININE mg/dL 1.03 1.16 1.30*   CALCIUM mg/dL 9.0 8.5* 8.9   GLUCOSE mg/dL 120* 115* 100*   Estimated Creatinine Clearance: 71.6 mL/min (by C-G formula based on SCr of 1.03 mg/dL).  No results found for: AMMONIA          No results found for: HGBA1C  No results found for: TSH, FREET4      Pain Management Panel    There is no flowsheet data to display.         No results found for: BLOODCX  No results found for: URINECX  Wound Culture   Date Value Ref Range Status   10/14/2022 No growth at 3 days  Final     No results found for: STOOLCX              Radiology:  Imaging Results (Last 72 Hours)     ** No results found for the last 72 hours. **            Assessment:   #1 status post right hip irrigation debridement          Plan:   Antibiotic recommendations per infectious disease services.  Orthopedic surgery continue to follow.  Physical therapy for mobilization.  Continue to trend CRP.  Will repeat CRP in the morning.  Hemoglobin stable.        Link Fowler, APRN October 19, 2022 16:04 EDT

## 2022-10-19 NOTE — PLAN OF CARE
Goal Outcome Evaluation:  Plan of Care Reviewed With: patient        Progress: no change  Outcome Evaluation: Pt resting in bed. Vital signs stable. Pt C/O pain and muscle spasms, PRN med given. Interventions effective per pt. No acute changes. Will continue plan of care.

## 2022-10-19 NOTE — PROGRESS NOTES
PROGRESS NOTE         Patient Identification:  Name:  Solitario Mcdaniel  Age:  71 y.o.  Sex:  male  :  1950  MRN:  [id number]  Visit Number:  [id number]  Primary Care Provider:  Corrine Mcdaniel APRN         LOS: 12 days       ----------------------------------------------------------------------------------------------------------------------  Subjective       Chief Complaints:    Hip Pain        Interval History:      Patient sitting up in bed eating breakfast this morning.  Currently on 2 L per nasal cannula with no apparent distress.  Reports sitting up in the chair yesterday and having muscle spasms in right upper leg.  Hemovac drain removed yesterday.  WBC stable at 13.03.  Right hip fungal culture from 10/14/2022 preliminary reports yeast observed.  Afebrile, no diarrhea.    Review of Systems:    Constitutional: no fever, chills and night sweats. No appetite change or unexpected weight change.  Fatigue and generalized weakness.  Eyes: no eye drainage, itching or redness.  HEENT: no mouth sores, dysphagia or nose bleed.  Respiratory: no for shortness of breath, cough or production of sputum.  Cardiovascular: no chest pain, no palpitations, no orthopnea.   Gastrointestinal: no nausea, vomiting. No diarrhea. No abdominal pain, hematemesis or rectal bleeding.  Genitourinary: no dysuria or polyuria.  Hematologic/lymphatic: no lymph node abnormalities, no easy bruising or easy bleeding.  Musculoskeletal:  Right hip pain.  Skin: No rash and no itching.  Neurological: no loss of consciousness, no seizure, no headache.  Chronic right upper extremity weakness.  Behavioral/Psych: no depression or suicidal ideation.  Endocrine: no hot flashes.  Immunologic: negative.      ----------------------------------------------------------------------------------------------------------------------      Objective       hospitals Meds:  amLODIPine, 10 mg, Oral, Daily  ertapenem, 1 g, Intravenous,  Q24H  ferrous sulfate, 325 mg, Oral, Daily With Breakfast  heparin (porcine), 5,000 Units, Subcutaneous, Q8H  ketoconazole, , Topical, Daily  metoprolol tartrate, 25 mg, Oral, BID  nystatin-triamcinolone, 1 application, Topical, Q12H  polyethylene glycol, 17 g, Oral, Daily  prenatal vitamin 27-0.8, 1 tablet, Oral, Daily  sodium chloride, 10 mL, Intravenous, Q12H  sodium chloride, 10 mL, Intravenous, Q12H  sodium chloride, 10 mL, Intravenous, Q12H  tamsulosin, 0.4 mg, Oral, Daily  vancomycin, 125 mg, Oral, Q6H  vitamin D3, 5,000 Units, Oral, Daily      Pharmacy Consult,       ----------------------------------------------------------------------------------------------------------------------    Vital Signs:  Temp:  [98 °F (36.7 °C)-98.3 °F (36.8 °C)] 98.3 °F (36.8 °C)  Heart Rate:  [64-79] 79  Resp:  [16-18] 18  BP: (125-156)/(51-61) 156/58  No data found.  SpO2 Percentage    10/18/22 0300 10/18/22 1837 10/19/22 0216   SpO2: 95% 97% 98%     SpO2:  [97 %-98 %] 98 %  on  Flow (L/min):  [2] 2;   Device (Oxygen Therapy): nasal cannula    Body mass index is 42.94 kg/m².  Wt Readings from Last 3 Encounters:   10/07/22 110 kg (242 lb 6.4 oz)   10/06/22 109 kg (240 lb)   06/17/21 118 kg (260 lb)        Intake/Output Summary (Last 24 hours) at 10/19/2022 0737  Last data filed at 10/19/2022 0216  Gross per 24 hour   Intake 1460 ml   Output 795 ml   Net 665 ml     Diet Regular  ----------------------------------------------------------------------------------------------------------------------      Physical Exam:    Constitutional:  male lying in bed on room air with no apparent distress.  HENT:  Head: Normocephalic and atraumatic.  Mouth:  Moist mucous membranes.  Poor dentition.  Eyes:  Conjunctivae and EOM are normal.  No scleral icterus.  Neck:  Neck supple.  No JVD present.    Cardiovascular:  Normal rate, regular rhythm and normal heart sounds with no murmur. No edema.  Pulmonary/Chest:  No respiratory  distress, no wheezes, no crackles, with normal breath sounds and good air movement.  Abdominal:  Soft.  Bowel sounds are normal.  No distension and no tenderness. Large hernia of the left sided abdomen.  Chronic indwelling Martin catheter with clear yellow urine in collection bag.  Musculoskeletal:  No tenderness and no deformity.  No swelling or redness of joints.  1+ edema of bilateral lower extremities with venous stasis changes.  Right hip pain with movement, and edema.   Neurological:  Alert and oriented to person, place, and time.  No facial droop.  No slurred speech.   Right upper extremity chronic weakness.  Skin:  Skin is warm and dry.  No rash noted.  No pallor.  Venous stasis changes of bilateral lower extremities. 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  Psychiatric:  Normal mood and affect.  Behavior is normal.       ----------------------------------------------------------------------------------------------------------------------            Results from last 7 days   Lab Units 10/19/22  0149 10/18/22  0301 10/16/22  1047 10/15/22  0454 10/14/22  0450 10/13/22  0930   CRP mg/dL  --  14.77* 16.29*  --   --  19.13*   LACTATE mmol/L  --   --   --   --   --  0.8   WBC 10*3/mm3 13.03* 13.12*  --  12.74*   < >  --    HEMOGLOBIN g/dL 12.2* 12.5*  --  12.1*   < >  --    HEMATOCRIT % 37.1* 40.1  --  38.0   < >  --    MCV fL 88.5 92.8  --  92.9   < >  --    MCHC g/dL 32.9 31.2*  --  31.8   < >  --    PLATELETS 10*3/mm3 483* 392  --  321   < >  --     < > = values in this interval not displayed.     Results from last 7 days   Lab Units 10/18/22  0301 10/15/22  0454 10/14/22  0450   SODIUM mmol/L 132* 132* 137   POTASSIUM mmol/L 4.7 4.5 4.1   CHLORIDE mmol/L 100 102 103   CO2 mmol/L 22.6 20.0* 23.2   BUN mg/dL 19 27* 30*   CREATININE mg/dL 1.03 1.16 1.30*   CALCIUM mg/dL 9.0 8.5* 8.9   GLUCOSE mg/dL 120* 115* 100*   Estimated Creatinine Clearance: 71.6 mL/min (by C-G formula based on SCr of 1.03 mg/dL).  No  results found for: AMMONIA    No results found for: HGBA1C, POCGLU  Lab Results   Component Value Date    HGBA1C 5.60 10/15/2022     No results found for: TSH, FREET4    Blood Culture   Date Value Ref Range Status   10/09/2022 Abnormal Stain (C)  Preliminary   10/09/2022 Abnormal Stain (C)  Preliminary   10/07/2022 Klebsiella pneumoniae ESBL (C)  Final     Comment:       Consider infectious disease consult.  Susceptibility results may not correlate to clinical outcomes.  For ESBL-producing infections in the blood, a carbapenem is recommended as first-line therapy for optimal clinical outcomes.   10/07/2022 Klebsiella pneumoniae ESBL (C)  Final     Comment:       Consider infectious disease consult.  Susceptibility results may not correlate to clinical outcomes.  For ESBL-producing infections in the blood, a carbapenem is recommended as first-line therapy for optimal clinical outcomes.     Urine Culture   Date Value Ref Range Status   10/07/2022 >100,000 CFU/mL Klebsiella pneumoniae ESBL (A)  Final     Comment:       Consider infectious disease consult.  Susceptibility results may not correlate to clinical outcomes.     No results found for: WOUNDCX  No results found for: STOOLCX  No results found for: RESPCX  Pain Management Panel    There is no flowsheet data to display.           ----------------------------------------------------------------------------------------------------------------------  Imaging Results (Last 24 Hours)     ** No results found for the last 24 hours. **          --------------------------------------------------------------------------------------    Pertinent Infectious Disease Results     Urinalysis on 10/7/2022 was positive with 31-50 WBCs and 4+ bacteria.  Urine culture finalized as greater than 100,000 colonies of ESBL E. coli.  Chest x-ray on 10/7/2022 showed cardiomegaly.  Coarsened interstitial markings noted throughout the lungs.  X-ray of the right hip on 10/6/2022 showed very  advanced right hip osteoarthritic change and possibly chronic AVN with deformity of the femoral head contour.  CT pelvis on 10/6/2022 showed there is a large anterior abdominal wall ventral hernia containing nondilated large and small bowel.  Again there is marked deformity of the right humeral head likely related to chronic AVN or advanced osteoarthritis.  No fracture line is evident.  MRI of the right hip on 10/7/2022 showed again noted are fairly advanced changes of osteoarthritic change and joint space collapse of the right hip.  Subchondral cystic change of both the femoral head and acetabulum are noted.  While no characteristic findings of AVN are noted this may represent sequelae of chronic AVN and subcortical collapse.  Small right hip joint effusion that could be reactive secondary to advanced osteoarthritic change.  In setting of fever and hip pain chronic septic arthritis could also be considered but the morphologic alteration of the joint do not appear to be acute.  COVID-19 and flu A/B PCR on 10/7/2022 was negative.  Blood cultures on 10/7/2022 finalized as ESBL Klebsiella pneumoniae.  C. difficile toxin PCR was positive although antigen was negative.        Assessment/Plan         ASSESSMENT:    Sepsis present on admission  ESBL Klebsiella pneumoniae bacteremia  ESBL Klebsiella pneumoniae complicated UTI      PLAN:    I saw and examined the patient myself this morning with KANDICE Ordoñez and got report from primary RN and here are my findings:    Patient sitting up in bed eating breakfast with right hip pain, currently on 2 L nasal cannula with no apparent distress and was able to sit up in a chair yesterday but continues to have muscle spasm in the right lower extremity.  Hemovac drain was removed yesterday.    Diarrhea is resolved with with 1 small formed bowel movement overnight.    Physical exam lungs clear to auscultation with no crackles wheezing or rhonchi and abdomen is soft with no  tenderness and no guarding extremities with mild bilateral lower extremity edema.  Right hip surgical wound looks clean with no erythema and Hemovac has been removed.    Right hip surgical intraoperative cultures showing growth of yeast on fungal culture and previously ESBL Klebsiella.    At this time I do not believe patient has fungal septic arthritis as he is not at risk for such an infection and his CRP is showing improvement with completely resolved fever but might consider initiating antifungal coverage if patient has relapsing sepsis or unexplained worsening of his CRP or WBC.    Unfortunately patient is going to be at very high risk for initiating non-C. diff antibiotic therapy and for now would continue with IV ertapenem and oral vancomycin and recommend referral to LTAC.    Very complicated case especially in the setting of new growth of yeast from a intraoperative specimen and we will follow-up closely to decide on initiating antifungal therapy.    Code Status:   Code Status and Medical Interventions:   Ordered at: 10/07/22 1416     Code Status (Patient has no pulse and is not breathing):    CPR (Attempt to Resuscitate)     Medical Interventions (Patient has pulse or is breathing):    Full Support       KANDICE Ordoñez  10/19/22  07:37 EDT    Electronically signed by KANDICE Ordoñez, 10/19/22, 7:39 AM EDT.    Electronically signed by Olena Ramsay MD, 10/19/22, 9:12 AM EDT.

## 2022-10-20 VITALS
HEART RATE: 72 BPM | DIASTOLIC BLOOD PRESSURE: 64 MMHG | SYSTOLIC BLOOD PRESSURE: 136 MMHG | BODY MASS INDEX: 42.95 KG/M2 | WEIGHT: 242.4 LBS | OXYGEN SATURATION: 98 % | HEIGHT: 63 IN | TEMPERATURE: 98.3 F | RESPIRATION RATE: 16 BRPM

## 2022-10-20 LAB
ANION GAP SERPL CALCULATED.3IONS-SCNC: 9.9 MMOL/L (ref 5–15)
BUN SERPL-MCNC: 18 MG/DL (ref 8–23)
BUN/CREAT SERPL: 17.8 (ref 7–25)
CALCIUM SPEC-SCNC: 9.3 MG/DL (ref 8.6–10.5)
CHLORIDE SERPL-SCNC: 100 MMOL/L (ref 98–107)
CO2 SERPL-SCNC: 27.1 MMOL/L (ref 22–29)
CREAT SERPL-MCNC: 1.01 MG/DL (ref 0.76–1.27)
CRP SERPL-MCNC: 10.21 MG/DL (ref 0–0.5)
DEPRECATED RDW RBC AUTO: 48.8 FL (ref 37–54)
EGFRCR SERPLBLD CKD-EPI 2021: 79 ML/MIN/1.73
ERYTHROCYTE [DISTWIDTH] IN BLOOD BY AUTOMATED COUNT: 14.9 % (ref 12.3–15.4)
GLUCOSE SERPL-MCNC: 113 MG/DL (ref 65–99)
HCT VFR BLD AUTO: 37.8 % (ref 37.5–51)
HGB BLD-MCNC: 12.2 G/DL (ref 13–17.7)
MCH RBC QN AUTO: 29 PG (ref 26.6–33)
MCHC RBC AUTO-ENTMCNC: 32.3 G/DL (ref 31.5–35.7)
MCV RBC AUTO: 89.8 FL (ref 79–97)
PLATELET # BLD AUTO: 425 10*3/MM3 (ref 140–450)
PMV BLD AUTO: 9.1 FL (ref 6–12)
POTASSIUM SERPL-SCNC: 4.4 MMOL/L (ref 3.5–5.2)
RBC # BLD AUTO: 4.21 10*6/MM3 (ref 4.14–5.8)
SARS-COV-2 AG RESP QL IA.RAPID: NORMAL
SODIUM SERPL-SCNC: 137 MMOL/L (ref 136–145)
WBC NRBC COR # BLD: 12.09 10*3/MM3 (ref 3.4–10.8)

## 2022-10-20 PROCEDURE — 86140 C-REACTIVE PROTEIN: CPT | Performed by: NURSE PRACTITIONER

## 2022-10-20 PROCEDURE — 25010000002 ERTAPENEM PER 500 MG: Performed by: NURSE PRACTITIONER

## 2022-10-20 PROCEDURE — 99232 SBSQ HOSP IP/OBS MODERATE 35: CPT | Performed by: INTERNAL MEDICINE

## 2022-10-20 PROCEDURE — 25010000002 HEPARIN (PORCINE) PER 1000 UNITS: Performed by: ORTHOPAEDIC SURGERY

## 2022-10-20 PROCEDURE — 99239 HOSP IP/OBS DSCHRG MGMT >30: CPT | Performed by: INTERNAL MEDICINE

## 2022-10-20 PROCEDURE — 80048 BASIC METABOLIC PNL TOTAL CA: CPT | Performed by: INTERNAL MEDICINE

## 2022-10-20 PROCEDURE — 85027 COMPLETE CBC AUTOMATED: CPT | Performed by: INTERNAL MEDICINE

## 2022-10-20 PROCEDURE — 87426 SARSCOV CORONAVIRUS AG IA: CPT | Performed by: INTERNAL MEDICINE

## 2022-10-20 RX ORDER — FERROUS SULFATE 325(65) MG
325 TABLET ORAL
Qty: 30 TABLET | Refills: 1 | Status: SHIPPED | OUTPATIENT
Start: 2022-10-20

## 2022-10-20 RX ORDER — VANCOMYCIN HYDROCHLORIDE 125 MG/1
125 CAPSULE ORAL EVERY 6 HOURS SCHEDULED
Qty: 104 CAPSULE | Refills: 0 | Status: SHIPPED | OUTPATIENT
Start: 2022-10-20 | End: 2022-11-15

## 2022-10-20 RX ADMIN — PRENATAL VIT W/ FE FUMARATE-FA TAB 27-0.8 MG 1 TABLET: 27-0.8 TAB at 09:11

## 2022-10-20 RX ADMIN — FERROUS SULFATE TAB 325 MG (65 MG ELEMENTAL FE) 325 MG: 325 (65 FE) TAB at 09:12

## 2022-10-20 RX ADMIN — HYDROCODONE BITARTRATE AND ACETAMINOPHEN 1 TABLET: 10; 325 TABLET ORAL at 05:41

## 2022-10-20 RX ADMIN — TAMSULOSIN HYDROCHLORIDE 0.4 MG: 0.4 CAPSULE ORAL at 09:11

## 2022-10-20 RX ADMIN — HYDROCODONE BITARTRATE AND ACETAMINOPHEN 1 TABLET: 10; 325 TABLET ORAL at 11:19

## 2022-10-20 RX ADMIN — Medication 10 ML: at 09:12

## 2022-10-20 RX ADMIN — METOPROLOL TARTRATE 25 MG: 25 TABLET, FILM COATED ORAL at 09:12

## 2022-10-20 RX ADMIN — AMLODIPINE BESYLATE 10 MG: 10 TABLET ORAL at 09:12

## 2022-10-20 RX ADMIN — NYSTATIN AND TRIAMCINOLONE ACETONIDE 1 APPLICATION: 100000; 1 CREAM TOPICAL at 09:12

## 2022-10-20 RX ADMIN — ACETAMINOPHEN 650 MG: 325 TABLET, FILM COATED ORAL at 03:09

## 2022-10-20 RX ADMIN — ERTAPENEM SODIUM 1 G: 1 INJECTION, POWDER, LYOPHILIZED, FOR SOLUTION INTRAMUSCULAR; INTRAVENOUS at 10:33

## 2022-10-20 RX ADMIN — Medication 5000 UNITS: at 09:12

## 2022-10-20 RX ADMIN — HEPARIN SODIUM 5000 UNITS: 5000 INJECTION INTRAVENOUS; SUBCUTANEOUS at 05:38

## 2022-10-20 RX ADMIN — VANCOMYCIN HYDROCHLORIDE 125 MG: 125 CAPSULE ORAL at 05:38

## 2022-10-20 RX ADMIN — VANCOMYCIN HYDROCHLORIDE 125 MG: 125 CAPSULE ORAL at 10:33

## 2022-10-20 NOTE — DISCHARGE SUMMARY
[facility] HOSPITALIST MEDICINE DISCHARGE SUMMARY    Patient Identification:  Name:  Solitario Mcdaniel  Age:  71 y.o.  Sex:  male  :  1950  MRN:  [id number]  Visit Number:  [id number]    Date of Admission: 10/7/2022  Date of Discharge: 10/20/2022    PCP: Corrine Mcdaniel, KANDICE    DISCHARGE DIAGNOSIS   1.  Sepsis (present on admission, resolved)  2.  ESBL Klebsiella bacteremia  3.  ESBL acute cystitis  4.  Right hip septic arthritis  5.  C. difficile colitis  6.  Essential hypertension      CONSULTS  1. Dr. Ramsay, ID  2. Dr. Alberto, Orthopedic Surgery      PROCEDURES PERFORMED   1.  Patient did undergo right hip incision with irrigation and drainage with placement of Hemovac drain on 10/14/2022.  Please see procedure note for specific details.  Patient tolerated the procedure well with no postprocedural complications.      HOSPITAL COURSE  Mr. Mcdaniel is a 71 y.o. male who presented to [facility] ED on 10/7/2022 with a chief complaint of right hip pain.  Patient has a past medical history remarkable for essential hypertension, arthritis and BPH.  Patient reported a mechanical fall at home 2 days prior to evaluation in the emergency department.  He reports sliding out of his recliner and after that fall he did not experience much pain.  However, several hours after his fall the pain in his right hip became quite severe with muscle spasms.  Patient then reports being unable to ambulate whatsoever and for this reason he did call orthopedic services (whom he had seen in an outpatient setting).  Recommendation was made to report to the emergency department.  Initial evaluation in the emergency department did consist of basic laboratory work as well as physical exam and vital signs.  Please see initial H&P for specific details.  Vital signs found patient's temperature 100.5 °F, respirations 16, heart rate 83 and blood pressure 158/76.  CMP was essentially within normal limits.  CRP was  elevated at 21.  Procalcitonin was elevated at 4.2.  CBC demonstrated white blood cell count of 15.8.  COVID-19 as well as influenza a and B swab were negative.  A CT of pelvis was obtained which demonstrated a large anterior abdominal wall ventral hernia and marked deformity of the right humeral head likely related to chronic AVN or advanced osteoarthritis.  No fracture was evident.  MRI of right hip with and without contrast revealed fairly advanced changes of osteoarthritic disease and joint space collapse of the right hip.  No characteristic findings of AVN were noted.  There was a small right hip joint effusion which was felt to be secondary to osteoarthritic change.  Overall, patient did meet sepsis criteria on admission with some concern for septic arthritis of the right hip and patient was admitted for further treatment and evaluation.    Initial evaluation in the emergency department also included urine analysis with urine culture as well as peripheral blood cultures x2.  Urine culture did eventually demonstrate ESBL Klebsiella pneumonia and blood cultures x2 demonstrated Klebsiella pneumonia as well.  Infectious disease was consulted who evaluated the patient.  Recommendation was made to transition antibiotic therapy to monotherapy Invanz.  Patient then developed diarrhea and patient was checked for C. difficile colitis.  C. difficile toxin assay was positive and patient was started on oral vancomycin.  In regards to right hip pain, orthopedic services were consulted to evaluate the patient.  After initial evaluation, recommendation was made for outpatient follow-up for suspected arthritic changes to right hip for consideration of right hip arthroplasty.  However, patient continued to complain of severe right hip pain which did lead to CT-guided aspiration of right hip.  Hip joint aspirate did grow ESBL Klebsiella pneumonia, thus confirming the diagnosis of septic arthritis in the right hip.  As such,  patient did undergo right hip incision with irrigation and drainage with placement of Hemovac drain on 10/14/2022.  Patient tolerated the procedure well with no postprocedural complications.  Recommendation has been made to continue patient on IV Invanz 1 g daily for a total of 6 weeks.  As patient has developed C. difficile colitis during this hospital stay, recommendation has been made to continue oral vancomycin throughout the remainder of the IV antibiotic administration as well as 72 hours post completion of IV Invanz.  In regards to diarrhea, this has resolved and patient's abdominal discomfort has dramatically improved.  Patient will have a follow-up appointment with orthopedic services within 1 week to reevaluate the right hip.  With this in mind, it is felt patient has reached maximum medical benefit of current hospitalization and will be discharged to a skilled nursing facility in stable condition today.  The beforementioned plan was thoroughly discussed with the patient and he expressed his understanding and willingness to proceed with the beforementioned plan    VITAL SIGNS:      10/07/22  1008 10/07/22  1622   Weight: 109 kg (240 lb 4.8 oz) 110 kg (242 lb 6.4 oz)     Body mass index is 42.94 kg/m².    PHYSICAL EXAM:  Constitutional: Well-nourished  male in no apparent distress.     HENT:  Head:  Normocephalic and atraumatic.  Mouth:  Moist mucous membranes.    Eyes:  Conjunctivae and EOM are normal.  Pupils are equal, round, and reactive to light.  No scleral icterus.    Neck:  Neck supple. No thyromegaly.  No JVD present.    Cardiovascular:  Regular rate and rhythm with no murmurs, rubs, clicks or gallops appreciated.  Pulmonary/Chest:  Clear to auscultation bilaterally with no crackles, wheezes or rhonchi appreciated.  Abdominal:  Soft. Nontender. Nondistended  Bowel sounds are normal in all four quadrants. No organomegally appreciated.   Musculoskeletal:  No edema, mild tenderness over right  hip joint where surgical incision is located, no deformity.  No red or swollen joints anywhere.    Neurological:  Alert, Cranial nerves II-XII intact with no focal deficits.  No facial droop.  No slurred speech.   Skin:  Warm and dry to palpation with no rashes or lesions appreciated.  Peripheral vascular:  2+ radial and pedal pulses in bilateral upper and lower extremities.  Psychiatric:  Alert and oriented x3, demonstrates appropriate judgment and insight.    DISCHARGE DISPOSITION   Stable    DISCHARGE MEDICATIONS:     Discharge Medications      New Medications      Instructions Start Date   ertapenem 1 g in sodium chloride 0.9 % 100 mL IVPB   1 g, Intravenous, Every 24 Hours      ferrous sulfate 325 (65 FE) MG tablet   325 mg, Oral, Daily With Breakfast      vancomycin 125 MG capsule  Commonly known as: VANCOCIN   125 mg, Oral, Every 6 Hours Scheduled         Continue These Medications      Instructions Start Date   acetaminophen 650 MG 8 hr tablet  Commonly known as: TYLENOL   650 mg, Oral, Every 8 Hours PRN      amLODIPine 10 MG tablet  Commonly known as: NORVASC   10 mg, Oral, Daily      Diclofenac Sodium 1 % gel gel  Commonly known as: VOLTAREN   4 g, Topical, 4 Times Daily PRN      HYDROcodone-acetaminophen 5-325 MG per tablet  Commonly known as: NORCO   1 tablet, Oral, Every 8 Hours PRN      metoprolol tartrate 25 MG tablet  Commonly known as: LOPRESSOR   25 mg, Oral, 2 Times Daily      prenatal vitamin 27-0.8 27-0.8 MG tablet tablet   1 tablet, Oral, Daily      tamsulosin 0.4 MG capsule 24 hr capsule  Commonly known as: FLOMAX   1 capsule, Oral, Daily      Vitamin D 50 MCG (2000 UT) tablet   2,000 Units, Oral, Daily         Stop These Medications    hydrALAZINE 50 MG tablet  Commonly known as: APRESOLINE            Diet Instructions    Diet as tolerated.         Your Scheduled Appointments    Oct 26, 2022  9:00 AM  Follow Up with Olena Ramsay MD  [facility] GROUP INFECTIOUS DISEASES  ([place]) [address]  [facility] [address] [phone]   Arrive 15 minutes prior to appointment.     Additional instructions:    Follow-up appointment scheduled with Dr. Alberto on 10/31/22 at 1:00 pm.  You can reach the office at .         Activity Instructions    Weight Bearing Status As Tolerated     Patient is to continue daily dressing changes with utilization of Betadine and island dressing to the right hip.  Patient will follow-up with Dr. Alberto next week for reexamination.  Physical therapy for mobilization.             Additional Instructions for the Follow-ups that You Need to Schedule     Discharge Follow-up with Specified Provider: Dr. Alberto; 1 Week   As directed      To: Dr. Alberto    Follow Up: 1 Week    Follow Up Details: s/p right hip irrigation/washout for septic arthritis            Contact information for follow-up providers     Clara Hyatt, APRN .    Specialty: Infectious Diseases  Contact information:  [address]  [facility] [address]  [facility] [address] [phone]             Corrine Mcdaniel, APRN .    Specialty: Nurse Practitioner  Contact information:  [address] [address]  [phone]             Chris Alberto, DO Follow up in 1 week(s).    Specialty: Orthopedic Surgery  Contact information:  [address] [facility] [address] [phone]                   Contact information for after-discharge care     Destination     [facility] .    Service: Skilled Nursing  Contact information:  [address] [place] [address] [phone]                             TEST  RESULTS PENDING AT DISCHARGE  Pending Labs     Order Current Status    Fungus Culture - Swab, Hip, Right Preliminary result           The ASCVD Risk score (Johann DK, et al., 2019) failed to calculate for the following reasons:    Cannot find a previous HDL lab    Cannot find a previous total cholesterol lab     Hans Gomez  DO [place]  10/20/22  12:24 EDT    Please note that this discharge summary required more than 30 minutes to complete.    Please send a copy of this dictation to the following providers:  Corrine Mcdaniel APRN

## 2022-10-20 NOTE — DISCHARGE INSTR - APPOINTMENTS
Follow-up appointment scheduled with Dr. Alberto on 10/31/22 at 1:00 pm.  You can reach the office at .

## 2022-10-20 NOTE — PLAN OF CARE
Goal Outcome Evaluation:              Outcome Evaluation: Patient resting in bed at this time. VSS. Patient complains of pain, PRN pain medication given. No acute changes. Will continue plan of care.

## 2022-10-20 NOTE — CASE MANAGEMENT/SOCIAL WORK
Discharge Planning Assessment  [facility]     Patient Name: Solitario Mcdaniel  MRN: [id number]  Today's Date: 10/20/2022    Admit Date: 10/7/2022     Discharge Plan     Row Name 10/20/22 0929       Plan    Final Discharge Disposition Code 03 - skilled nursing facility (SNF)    Final Note Pt is being discharged to [facility] today. SS notified [facility] and [facility] per Christy and they request a Covid test be done. SS notified Dr. Avilez. [facility] to print orders and RN will call report after Covid test result is available. SS contacted pt's daughter, Maryann Blankenship who voices agreement for pt to be discharged to [facility] today. Pt will need to be transported via EMS. SS to complete EMS PCS form and will arrange transport once pt is ready. SS to follow.    12:11: SS spoke to pt and pt voices agreement to be discharged to [facility] today. RN called report to [facility] after Covid test resulted. [facility] and they are unavailable. SS contacted [facility] per Kamryn to schedule EMS transport. No other needs identified.              Continued Care and Services - Admitted Since 10/7/2022     Destination Coordination complete.    Service Provider Request Status Selected Services Address Phone Fax Patient Preferred    [facility]  Selected Skilled Nursing [address] [place] [id number] [phone] --    [facility]  Medicare Part A only N/A [address] [facility] [id number] [phone] --              Expected Discharge Date and Time     Expected Discharge Date Expected Discharge Time    Oct 20, 2022         TYSON Benitez

## 2022-10-20 NOTE — PROGRESS NOTES
PROGRESS NOTE         Patient Identification:  Name:  Solitario Mcdaniel  Age:  71 y.o.  Sex:  male  :  1950  MRN:  [id number]  Visit Number:  [id number]  Primary Care Provider:  Corrine Mcdaniel APRN         LOS: 13 days       ----------------------------------------------------------------------------------------------------------------------  Subjective       Chief Complaints:    Hip Pain        Interval History:      Patient is sitting up in bed on 2 L nasal cannula in no apparent distress.  Eating breakfast and appears comfortable.  Reports 2 loose bowel movements overnight.  Otherwise, patient continues to have right hip pain.  Afebrile.  WBC is improved at 12.09.  CRP is improved at 10.21.  Right hip fungal culture on 10/14/2022 is so far showing yeast.    Review of Systems:    Constitutional: no fever, chills and night sweats. No appetite change or unexpected weight change.  Fatigue and generalized weakness.  Eyes: no eye drainage, itching or redness.  HEENT: no mouth sores, dysphagia or nose bleed.  Respiratory: no for shortness of breath, cough or production of sputum.  Cardiovascular: no chest pain, no palpitations, no orthopnea.   Gastrointestinal: no nausea or vomiting. No abdominal pain, hematemesis or rectal bleeding.  Diarrhea.  Genitourinary: no dysuria or polyuria.  Hematologic/lymphatic: no lymph node abnormalities, no easy bruising or easy bleeding.  Musculoskeletal:  Right hip pain.  Skin: No rash and no itching.  Neurological: no loss of consciousness, no seizure, no headache.  Chronic right upper extremity weakness.  Behavioral/Psych: no depression or suicidal ideation.  Endocrine: no hot flashes.  Immunologic: negative.      ----------------------------------------------------------------------------------------------------------------------      Objective       Current [facility] Meds:  amLODIPine, 10 mg, Oral, Daily  ertapenem, 1 g, Intravenous, Q24H  ferrous sulfate, 325  mg, Oral, Daily With Breakfast  heparin (porcine), 5,000 Units, Subcutaneous, Q8H  metoprolol tartrate, 25 mg, Oral, BID  nystatin-triamcinolone, 1 application, Topical, Q12H  polyethylene glycol, 17 g, Oral, Daily  prenatal vitamin 27-0.8, 1 tablet, Oral, Daily  sodium chloride, 10 mL, Intravenous, Q12H  sodium chloride, 10 mL, Intravenous, Q12H  sodium chloride, 10 mL, Intravenous, Q12H  tamsulosin, 0.4 mg, Oral, Daily  vancomycin, 125 mg, Oral, Q6H  vitamin D3, 5,000 Units, Oral, Daily      Pharmacy Consult,       ----------------------------------------------------------------------------------------------------------------------    Vital Signs:  Temp:  [98.1 °F (36.7 °C)-99 °F (37.2 °C)] 98.3 °F (36.8 °C)  Heart Rate:  [68-79] 72  Resp:  [15-20] 16  BP: (135-159)/(60-71) 136/64  No data found.  SpO2 Percentage    10/18/22 0300 10/18/22 1837 10/19/22 0216   SpO2: 95% 97% 98%        on  Flow (L/min):  [2] 2;   Device (Oxygen Therapy): nasal cannula    Body mass index is 42.94 kg/m².  Wt Readings from Last 3 Encounters:   10/07/22 110 kg (242 lb 6.4 oz)   10/06/22 109 kg (240 lb)   06/17/21 118 kg (260 lb)        Intake/Output Summary (Last 24 hours) at 10/20/2022 0864  Last data filed at 10/20/2022 0581  Gross per 24 hour   Intake 360 ml   Output 1300 ml   Net -940 ml     Diet Regular  ----------------------------------------------------------------------------------------------------------------------      Physical Exam:    Constitutional:  male is sitting up in bed on room air in no apparent distress.  Eating breakfast and appears comfortable.  HENT:  Head: Normocephalic and atraumatic.  Mouth:  Moist mucous membranes.  Poor dentition.  Eyes:  Conjunctivae and EOM are normal.  No scleral icterus.  Neck:  Neck supple.  No JVD present.    Cardiovascular:  Normal rate, regular rhythm and normal heart sounds with no murmur. No edema.  Pulmonary/Chest:  No respiratory distress, no wheezes, no crackles, with  normal breath sounds and good air movement.  Abdominal:  Soft.  Bowel sounds are normal.  No distension and no tenderness. Large hernia of the left sided abdomen.  Chronic indwelling Martin catheter with dark yellow urine in collection bag.  Musculoskeletal:  No tenderness and no deformity.  No swelling or redness of joints.  1+ edema of bilateral lower extremities with venous stasis changes.  Right hip pain with movement.  Neurological:  Alert and oriented to person, place, and time.  No facial droop.  No slurred speech.   Right upper extremity chronic weakness.  Skin:  Skin is warm and dry.  No rash noted.  No pallor.  Venous stasis changes of bilateral lower extremities. 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  Psychiatric:  Normal mood and affect.  Behavior is normal.       ----------------------------------------------------------------------------------------------------------------------            Results from last 7 days   Lab Units 10/20/22  0127 10/19/22  0149 10/18/22  0301 10/16/22  1047 10/14/22  0450 10/13/22  0930   CRP mg/dL 10.21*  --  14.77* 16.29*  --  19.13*   LACTATE mmol/L  --   --   --   --   --  0.8   WBC 10*3/mm3 12.09* 13.03* 13.12*  --    < >  --    HEMOGLOBIN g/dL 12.2* 12.2* 12.5*  --    < >  --    HEMATOCRIT % 37.8 37.1* 40.1  --    < >  --    MCV fL 89.8 88.5 92.8  --    < >  --    MCHC g/dL 32.3 32.9 31.2*  --    < >  --    PLATELETS 10*3/mm3 425 483* 392  --    < >  --     < > = values in this interval not displayed.     Results from last 7 days   Lab Units 10/20/22  0127 10/18/22  0301 10/15/22  0454   SODIUM mmol/L 137 132* 132*   POTASSIUM mmol/L 4.4 4.7 4.5   CHLORIDE mmol/L 100 100 102   CO2 mmol/L 27.1 22.6 20.0*   BUN mg/dL 18 19 27*   CREATININE mg/dL 1.01 1.03 1.16   CALCIUM mg/dL 9.3 9.0 8.5*   GLUCOSE mg/dL 113* 120* 115*   Estimated Creatinine Clearance: 73 mL/min (by C-G formula based on SCr of 1.01 mg/dL).  No results found for: AMMONIA    No results found for:  HGBA1C, POCGLU  Lab Results   Component Value Date    HGBA1C 5.60 10/15/2022     No results found for: TSH, FREET4    Blood Culture   Date Value Ref Range Status   10/09/2022 Abnormal Stain (C)  Preliminary   10/09/2022 Abnormal Stain (C)  Preliminary   10/07/2022 Klebsiella pneumoniae ESBL (C)  Final     Comment:       Consider infectious disease consult.  Susceptibility results may not correlate to clinical outcomes.  For ESBL-producing infections in the blood, a carbapenem is recommended as first-line therapy for optimal clinical outcomes.   10/07/2022 Klebsiella pneumoniae ESBL (C)  Final     Comment:       Consider infectious disease consult.  Susceptibility results may not correlate to clinical outcomes.  For ESBL-producing infections in the blood, a carbapenem is recommended as first-line therapy for optimal clinical outcomes.     Urine Culture   Date Value Ref Range Status   10/07/2022 >100,000 CFU/mL Klebsiella pneumoniae ESBL (A)  Final     Comment:       Consider infectious disease consult.  Susceptibility results may not correlate to clinical outcomes.     No results found for: WOUNDCX  No results found for: STOOLCX  No results found for: RESPCX  Pain Management Panel    There is no flowsheet data to display.           ----------------------------------------------------------------------------------------------------------------------  Imaging Results (Last 24 Hours)     ** No results found for the last 24 hours. **          --------------------------------------------------------------------------------------    Pertinent Infectious Disease Results     Urinalysis on 10/7/2022 was positive with 31-50 WBCs and 4+ bacteria.  Urine culture finalized as greater than 100,000 colonies of ESBL E. coli.  Chest x-ray on 10/7/2022 showed cardiomegaly.  Coarsened interstitial markings noted throughout the lungs.  X-ray of the right hip on 10/6/2022 showed very advanced right hip osteoarthritic change and possibly  chronic AVN with deformity of the femoral head contour.  CT pelvis on 10/6/2022 showed there is a large anterior abdominal wall ventral hernia containing nondilated large and small bowel.  Again there is marked deformity of the right humeral head likely related to chronic AVN or advanced osteoarthritis.  No fracture line is evident.  MRI of the right hip on 10/7/2022 showed again noted are fairly advanced changes of osteoarthritic change and joint space collapse of the right hip.  Subchondral cystic change of both the femoral head and acetabulum are noted.  While no characteristic findings of AVN are noted this may represent sequelae of chronic AVN and subcortical collapse.  Small right hip joint effusion that could be reactive secondary to advanced osteoarthritic change.  In setting of fever and hip pain chronic septic arthritis could also be considered but the morphologic alteration of the joint do not appear to be acute.  COVID-19 and flu A/B PCR on 10/7/2022 was negative.  Blood cultures on 10/7/2022 finalized as ESBL Klebsiella pneumoniae.  C. difficile toxin PCR was positive although antigen was negative.      Assessment/Plan         ASSESSMENT:    Sepsis present on admission  ESBL Klebsiella pneumoniae bacteremia  ESBL Klebsiella pneumoniae complicated UTI      PLAN:    I have seen and examined the patient myself this morning and discussed the plan of care with Elisa Verduzco PA-C and got report from primary RN. Here are my findings:    Patient is sitting up in bed on 2 L nasal cannula with no apparent distress continues with right hip pain.  Eating breakfast with reported 2 loose bowel movements overnight but no fever.    Lungs are clear to auscultation with no crackles wheezing or rhonchi and abdomen is soft nontender the hip surgical wound is very clean with no erythema or drainage and the pain seems to be improving.    WBC has improved down to 12.09 and CRP is improved down to 10.21 with right hip  fungal culture from 10/14/2022 so far showing yeast.    I discussed his case with primary team Dr. Avilez regarding our concern for yeast infection of the joint as patient is getting discharged to nursing home today and recommended ID follow-up to be able to decide further if patient needs antifungal coverage or if his yeast is a colonization and for now we will continue with 6-week course of IV Invanz along with oral vancomycin to continue for 72 hours after stopping Invanz therapy as patient is at high risk for relapsing C. difficile colitis.    Code Status:   Code Status and Medical Interventions:   Ordered at: 10/07/22 1416     Code Status (Patient has no pulse and is not breathing):    CPR (Attempt to Resuscitate)     Medical Interventions (Patient has pulse or is breathing):    Full Support       Elisa Verduzco PA-C  10/20/22  08:45 EDT    Electronically signed by Elisa Verduzco PA-C, 10/20/22, 8:49 AM EDT.  Electronically signed by Olena Ramsay MD, 10/20/22, 9:30 AM EDT.

## 2022-10-20 NOTE — PROGRESS NOTES
Inpatient Progress Note  Solitario Mcdaniel  Date: 10/20/22  MRN: [id number]      Subjective:   Patient notes no new complaints today.  He is status post right hip irrigation debridement postop day #5.  Infectious disease continues to follow the patient.  CRP is trending down.  He notes pain is controlled today.  He is afebrile on exam.        Objective:    Vitals:    10/19/22 1400 10/19/22 1900 10/20/22 0300 10/20/22 0600   BP: 135/60 144/63 159/71 136/64   BP Location: Right arm Left arm Left arm Left arm   Patient Position: Lying Lying Lying Lying   Pulse: 68 78 79 72   Resp: 18 15 20 16   Temp: 98.6 °F (37 °C) 98.1 °F (36.7 °C) 99 °F (37.2 °C) 98.3 °F (36.8 °C)   TempSrc: Oral Oral Oral Oral   SpO2:       Weight:       Height:              Physical Exam:  Constitutional: Chronically ill male.  No respiratory distress.         Musculoskeletal:Upon examination of the right hip no active drainage is noted.  Surgical incision is healing well.  No cellulitis noted.  Neurovascularly intact right lower extremity.  Dorsiflexion plantarflexion intact right ankle.  No foot drop abnormality is noted.  Positive pedal pulses noted right lower extremity.  No cyanosis right lower extremity noted.  No dehiscence of the surgical incision noted.      Labs:    Results from last 7 days   Lab Units 10/20/22  0127 10/19/22  0149 10/18/22  0301 10/16/22  1047 10/14/22  0450 10/13/22  0930   CRP mg/dL 10.21*  --  14.77* 16.29*  --  19.13*   LACTATE mmol/L  --   --   --   --   --  0.8   WBC 10*3/mm3 12.09* 13.03* 13.12*  --    < >  --    HEMOGLOBIN g/dL 12.2* 12.2* 12.5*  --    < >  --    HEMATOCRIT % 37.8 37.1* 40.1  --    < >  --    MCV fL 89.8 88.5 92.8  --    < >  --    MCHC g/dL 32.3 32.9 31.2*  --    < >  --    PLATELETS 10*3/mm3 425 483* 392  --    < >  --     < > = values in this interval not displayed.         Results from last 7 days   Lab Units 10/20/22  0127 10/18/22  0301 10/15/22  0454   SODIUM mmol/L 137 132* 132*   POTASSIUM  mmol/L 4.4 4.7 4.5   CHLORIDE mmol/L 100 100 102   CO2 mmol/L 27.1 22.6 20.0*   BUN mg/dL 18 19 27*   CREATININE mg/dL 1.01 1.03 1.16   CALCIUM mg/dL 9.3 9.0 8.5*   GLUCOSE mg/dL 113* 120* 115*   Estimated Creatinine Clearance: 73 mL/min (by C-G formula based on SCr of 1.01 mg/dL).  No results found for: AMMONIA          No results found for: HGBA1C  No results found for: TSH, FREET4      Pain Management Panel    There is no flowsheet data to display.         No results found for: BLOODCX  No results found for: URINECX  Wound Culture   Date Value Ref Range Status   10/14/2022 No growth at 3 days  Final     No results found for: STOOLCX              Radiology:  Imaging Results (Last 72 Hours)     ** No results found for the last 72 hours. **            Assessment:   #1 status post right hip irrigation debridement          Plan:   Patient's CRP is continuing to trend down.  Antibiotic recommendations per infectious disease services.  Patient is to continue daily dressing changes with utilization of Betadine and island dressing to the right hip.  Patient will follow-up with Dr. Alberto next week for reexamination.  Physical therapy for mobilization.        Link Fowler, APRN October 20, 2022 09:03 EDT

## 2022-10-20 NOTE — DISCHARGE INSTR - ACTIVITY
Weight Bearing Status As Tolerated     Patient is to continue daily dressing changes with utilization of Betadine and island dressing to the right hip.  Patient will follow-up with Dr. Alberto next week for reexamination.  Physical therapy for mobilization.

## 2022-10-26 ENCOUNTER — OFFICE VISIT (OUTPATIENT)
Dept: INFECTIOUS DISEASES | Facility: CLINIC | Age: 72
End: 2022-10-26

## 2022-10-26 VITALS
OXYGEN SATURATION: 100 % | DIASTOLIC BLOOD PRESSURE: 74 MMHG | HEIGHT: 63 IN | WEIGHT: 222 LBS | SYSTOLIC BLOOD PRESSURE: 118 MMHG | HEART RATE: 64 BPM | TEMPERATURE: 97.9 F | BODY MASS INDEX: 39.34 KG/M2

## 2022-10-26 DIAGNOSIS — A49.9 ESBL (EXTENDED SPECTRUM BETA-LACTAMASE) PRODUCING BACTERIA INFECTION: ICD-10-CM

## 2022-10-26 DIAGNOSIS — A04.72 CLOSTRIDIUM DIFFICILE COLITIS: ICD-10-CM

## 2022-10-26 DIAGNOSIS — R78.81 BACTEREMIA: ICD-10-CM

## 2022-10-26 DIAGNOSIS — Z16.12 ESBL (EXTENDED SPECTRUM BETA-LACTAMASE) PRODUCING BACTERIA INFECTION: ICD-10-CM

## 2022-10-26 DIAGNOSIS — M00.851 ARTHRITIS OF RIGHT HIP DUE TO OTHER BACTERIA: Primary | ICD-10-CM

## 2022-10-26 PROCEDURE — 99214 OFFICE O/P EST MOD 30 MIN: CPT | Performed by: INTERNAL MEDICINE

## 2022-10-26 NOTE — PROGRESS NOTES
[facility]         Referring Provider: Tania Campos APRN  [address]  [address]  [place] [address]    Subjective      Chief Complaint  Urinary Tract Infection, ESBL Lebsiella Bacteremia, and CDIFF (Referring provider: KANDICE Bernal/PT CURRENTLY ON IV INVANZ 1 g daily per ER NOTE)    History of Present Illness  Solitario Mcdaniel is a 71 y.o. male who presents today to [facility] for Follow Up . 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 difficile colitis.  At this time his right hip is healing very well with significantly less pain patient is able to stand on it and ambulate some and does not have any diarrhea continues on ertapenem IV and oral vancomycin.    Past Medical History:   Diagnosis Date   • Arthritis    • BPH (benign prostatic hyperplasia)    • Hypertension    • Osteoarthritis        Past Surgical History:   Procedure Laterality Date   • COLONOSCOPY     • INCISION AND DRAINAGE HIP Right 10/14/2022    Procedure: HIP INCISION AND DRAINAGE;  Surgeon: Chris Alberto DO;  Location: [facility];  Service: Orthopedics;  Laterality: Right;   • TONSILLECTOMY     • VASECTOMY         Social History     Socioeconomic History   • Marital status:    Tobacco Use   • Smoking status: Never   Substance and Sexual Activity   • Alcohol use: Never   • Drug use: Never   • Sexual activity: Not Currently       Family History  family history is not on file.    Immunization History   Administered Date(s) Administered   • FLUAD TRI 65YR+ 03/30/2021        Allergies  No Known Allergies    The medication list has been reviewed and updated.   Current Medications    Current Outpatient Medications:   •  acetaminophen (TYLENOL) 650 MG 8 hr tablet, Take 650 mg by mouth Every 8 (Eight) Hours As Needed for Mild Pain or Moderate Pain., Disp: , Rfl:   •   amLODIPine (NORVASC) 10 MG tablet, Take 10 mg by mouth Daily., Disp: , Rfl:   •  Cholecalciferol (Vitamin D) 50 MCG (2000 UT) tablet, Take 2,000 Units by mouth Daily., Disp: , Rfl:   •  Diclofenac Sodium (VOLTAREN) 1 % gel gel, Apply 4 g topically to the appropriate area as directed 4 (Four) Times a Day As Needed (arthritis pain)., Disp: , Rfl:   •  ferrous sulfate 325 (65 FE) MG tablet, Take 1 tablet by mouth Daily With Breakfast., Disp: 30 tablet, Rfl: 1  •  metoprolol tartrate (LOPRESSOR) 25 MG tablet, Take 25 mg by mouth 2 (Two) Times a Day., Disp: , Rfl:   •  Prenatal Vit-Fe Fumarate-FA (prenatal vitamin 27-0.8) 27-0.8 MG tablet tablet, Take 1 tablet by mouth Daily., Disp: , Rfl:   •  tamsulosin (FLOMAX) 0.4 MG capsule 24 hr capsule, Take 1 capsule by mouth Daily., Disp: , Rfl:   •  vancomycin (VANCOCIN) 125 MG capsule, Take 1 capsule by mouth Every 6 (Six) Hours for 26 days. Indications: Colon Inflammation due to Clostridium Bacteria Overgrowth, Disp: 104 capsule, Rfl: 0  •  ertapenem 1 g in sodium chloride 0.9 % 100 mL IVPB, Infuse 1 g into a venous catheter Daily for 23 days. Indications: Urinary Tract Infection, Disp: , Rfl:   •  HYDROcodone-acetaminophen (NORCO) 5-325 MG per tablet, Take 1 tablet by mouth Every 8 (Eight) Hours As Needed for Moderate Pain., Disp: 10 tablet, Rfl: 0      Review of Systems    Review of Systems   Constitutional: Negative for activity change, appetite change, chills, diaphoresis and fever.   HENT: Negative for congestion, dental problem, drooling, mouth sores, sinus pressure and sneezing.    Eyes: Negative for blurred vision, double vision, photophobia and discharge.   Respiratory: Negative for apnea, cough, choking, chest tightness, shortness of breath and wheezing.    Cardiovascular: Negative for chest pain, palpitations and leg swelling.   Gastrointestinal: Negative for abdominal distention, abdominal pain, diarrhea, nausea and vomiting.   Genitourinary: Negative for dysuria,  "flank pain and frequency.   Musculoskeletal: Positive for arthralgias and gait problem. Negative for neck pain and neck stiffness.        Right hip pain   Skin: Negative for rash.   Neurological: Negative for dizziness, tremors, seizures, syncope, speech difficulty, numbness, headache and confusion.   Psychiatric/Behavioral: Negative for agitation, behavioral problems, hallucinations and suicidal ideas.        Objective     Vital Signs:  /74 (BP Location: Left arm, Patient Position: Sitting, Cuff Size: Adult)   Pulse 64   Temp 97.9 °F (36.6 °C) (Temporal)   Ht 160 cm (63\")   Wt 101 kg (222 lb)   SpO2 100%   BMI 39.33 kg/m²   Estimated body mass index is 39.33 kg/m² as calculated from the following:    Height as of this encounter: 160 cm (63\").    Weight as of this encounter: 101 kg (222 lb).    Physical Exam  Constitutional:       General: He is not in acute distress.     Appearance: Normal appearance. He is normal weight. He is not ill-appearing, toxic-appearing or diaphoretic.   HENT:      Head: Normocephalic and atraumatic.      Nose: Nose normal. No congestion or rhinorrhea.      Mouth/Throat:      Mouth: Mucous membranes are moist.      Pharynx: Oropharynx is clear.   Eyes:      General: No scleral icterus.     Extraocular Movements: Extraocular movements intact.      Pupils: Pupils are equal, round, and reactive to light.   Neck:      Vascular: No carotid bruit.   Cardiovascular:      Rate and Rhythm: Normal rate and regular rhythm.      Pulses: Normal pulses.      Heart sounds: No murmur heard.  Pulmonary:      Effort: Pulmonary effort is normal. No respiratory distress.      Breath sounds: Normal breath sounds. No stridor. No wheezing, rhonchi or rales.   Chest:      Chest wall: No tenderness.   Abdominal:      General: Abdomen is flat. Bowel sounds are normal. There is no distension.      Palpations: Abdomen is soft.      Tenderness: There is no abdominal tenderness. There is no guarding or " rebound.   Musculoskeletal:      Cervical back: Normal range of motion and neck supple. No rigidity or tenderness.      Comments: Right hip surgical wound looks great with no erythema and no drainage sutures in place.  Overall pain has significantly improved.   Lymphadenopathy:      Cervical: No cervical adenopathy.   Skin:     Coloration: Skin is not jaundiced.      Findings: No lesion or rash.   Neurological:      General: No focal deficit present.      Mental Status: He is alert and oriented to person, place, and time.   Psychiatric:         Mood and Affect: Mood normal.         Behavior: Behavior normal.          Result Review :  The following data was reviewed by Olena Ramsay MD     Lab Results  Lab Results   Component Value Date    WBC 12.09 (H) 10/20/2022    HGB 12.2 (L) 10/20/2022    HCT 37.8 10/20/2022    MCV 89.8 10/20/2022     10/20/2022     Lab Results   Component Value Date    GLUCOSE 113 (H) 10/20/2022    BUN 18 10/20/2022    CREATININE 1.01 10/20/2022    EGFRIFNONA 55 (L) 06/17/2021    BCR 17.8 10/20/2022    K 4.4 10/20/2022    CO2 27.1 10/20/2022    CALCIUM 9.3 10/20/2022    ALBUMIN 3.61 10/07/2022    AST 61 (H) 10/07/2022    ALT 28 10/07/2022      Lab Results   Component Value Date    CRP 10.21 (H) 10/20/2022        No results found for: ACANTHNAEG, AFBCX, BPERTUSSISCX, BLOODCX  No results found for: BCIDPCR, CXREFLEX, CSFCX, CULTURETIS  No results found for: CULTURES, HSVCX, URCX  No results found for: EYECULTURE, GCCX, HSVCULTURE, LABHSV  No results found for: LEGIONELLA, MRSACX, MUMPSCX, MYCOPLASCX  No results found for: NOCARDIACX, STOOLCX  No results found for: THROATCX, UNSTIMCULT, URINECX, CULTURE, VZVCULTUR  No results found for: VIRALCULTU, WOUNDCX    Radiology Results  CT Pelvis Without Contrast    Result Date: 10/6/2022  Impression: 1.  There is a large anterior abdominal wall ventral hernia containing nondilated large and small bowel. 2.  Again there is marked deformity of  the right humeral head likely related to chronic AVN or advanced osteoarthritis. No fracture line is evident.  This report was finalized on 10/6/2022 10:52 AM by Dr. Moiz Lobo MD.      MRI Hip Right With & Without Contrast    Result Date: 10/7/2022  Impression: 1.  Again noted are fairly advanced changes of osteoarthritic change and joint space collapse of the right hip. Subchondral cystic change of both the femoral head and acetabulum are noted. While no characteristic findings of AVN are noted this may represent sequelae of chronic AVN and subcortical collapse. 2.  Small right hip joint effusion that could be reactive secondary to advanced osteoarthritic change. 3.  In setting of fever and hip pain chronic septic arthritis could also be considered but the morphologic alteration of the joint do not appear to be acute.  This report was finalized on 10/7/2022 1:47 PM by Dr. Moiz Lobo MD.      XR Chest AP    Result Date: 10/7/2022  Impression:   Cardiomegaly.  This report was finalized on 10/7/2022 11:24 AM by Dr. Moiz Lobo MD.      XR Hip With or Without Pelvis 2 - 3 View Right    Result Date: 10/6/2022  Impression:   Very advanced right hip osteoarthritic change and possible chronic AVN with deformity of the femoral head contour.  This report was finalized on 10/6/2022 8:40 AM by Dr. Moiz Lobo MD.      FL Guided Aspiration Joint    Result Date: 10/10/2022  Impression: Technically successful diagnostic aspiration of the right hip joint.  This report was finalized on 10/10/2022 3:34 PM by Dr. Chidi Mackay MD.               Assessment / Plan         Assessment   Diagnoses and all orders for this visit:    1. 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 Clostridium difficile colitis  -     CBC & Differential; Future  -     Comprehensive Metabolic Panel; Future  -      C-reactive Protein; Future    3. Bacteremia  -     CBC & Differential; Future  -     Comprehensive Metabolic Panel; Future  -     C-reactive Protein; Future    4. ESBL (extended spectrum beta-lactamase) producing bacteria infection  -     CBC & Differential; Future  -     Comprehensive Metabolic Panel; Future  -     C-reactive Protein; Future      Fortunately patient has resolved diarrhea at this time with his recent C. difficile colitis and will continue to monitor for relapsing diarrhea closely as he would be at risk for recurrent C. difficile.  His last WBC and CRP has shown significant improvement but no recent labs done and his yeast culture is very likely a colonization or contamination as he is still in very well off antifungal coverage.  Patient is finishing his 6-week course of ertapenem and to extend his oral vancomycin for 72 hours after his ertapenem finished date.    Would recommend for patient to follow-up with Dr. Alberto orthopedic surgeon to have suture removed as the wound looks great with very good healing progress with no evidence of active infection.    I ordered for the patient to have CBC, CMP and CRP levels done on October 31, 2022 with follow-up on November 1, 2022.    Patient to monitor for any new onset diarrhea and notify me as soon as possible.    Follow Up   Return in about 1 week (around 11/2/2022).    Visit Diagnoses:    ICD-10-CM ICD-9-CM   1. 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 Clostridium difficile colitis  A04.72 008.45   3. Bacteremia  R78.81 790.7   4. ESBL (extended spectrum beta-lactamase) producing bacteria infection  A49.9 041.89    Z16.12 V09.1       Patient was given instructions and counseling regarding his condition or for health maintenance advice. Please see specific information pulled into the AVS if appropriate.     This document has been electronically signed by Olena Ramsay MD   October 26, 2022 09:46 EDT    Dictated  Utilizing Dragon Dictation: Part of this note may be an electronic transcription/translation of spoken language to printed text using the Dragon Dictation System.

## 2022-11-01 ENCOUNTER — OFFICE VISIT (OUTPATIENT)
Dept: INFECTIOUS DISEASES | Facility: CLINIC | Age: 72
End: 2022-11-01

## 2022-11-01 DIAGNOSIS — A04.72 CLOSTRIDIUM DIFFICILE COLITIS: ICD-10-CM

## 2022-11-01 DIAGNOSIS — Z16.12 ESBL (EXTENDED SPECTRUM BETA-LACTAMASE) PRODUCING BACTERIA INFECTION: Primary | ICD-10-CM

## 2022-11-01 DIAGNOSIS — M00.851 ARTHRITIS OF RIGHT HIP DUE TO OTHER BACTERIA: ICD-10-CM

## 2022-11-01 DIAGNOSIS — A49.9 ESBL (EXTENDED SPECTRUM BETA-LACTAMASE) PRODUCING BACTERIA INFECTION: Primary | ICD-10-CM

## 2022-11-01 DIAGNOSIS — L21.9 SEBORRHEIC DERMATITIS: ICD-10-CM

## 2022-11-01 DIAGNOSIS — R78.81 BACTEREMIA: ICD-10-CM

## 2022-11-01 PROCEDURE — 99213 OFFICE O/P EST LOW 20 MIN: CPT | Performed by: INTERNAL MEDICINE

## 2022-11-01 NOTE — PROGRESS NOTES
[facility]         Referring Provider: Elisa Verduzco PA-C  [address] [address]    Subjective      Chief Complaint  No chief complaint on file.    History of Present Illness  Solitario Mcdaniel is a 71 y.o. male who presents today to [facility] for Follow Up . Past medical history is significant for right hip septic arthritis and C. difficile colitis and is following up after removal of sutures with complete healing of the right hip that looks great without any evidence of erythema or drainage and no evidence of recurrent diarrhea.  His facial rash has completely resolved as well.    Past Medical History:   Diagnosis Date   • Arthritis    • BPH (benign prostatic hyperplasia)    • Hypertension    • Osteoarthritis        Past Surgical History:   Procedure Laterality Date   • COLONOSCOPY     • INCISION AND DRAINAGE HIP Right 10/14/2022    Procedure: HIP INCISION AND DRAINAGE;  Surgeon: Chris Alberto DO;  Location: [facility];  Service: Orthopedics;  Laterality: Right;   • TONSILLECTOMY     • VASECTOMY         Social History     Socioeconomic History   • Marital status:    Tobacco Use   • Smoking status: Never   Substance and Sexual Activity   • Alcohol use: Never   • Drug use: Never   • Sexual activity: Not Currently       Family History  family history is not on file.    Immunization History   Administered Date(s) Administered   • FLUAD TRI 65YR+ 03/30/2021        Allergies  No Known Allergies    The medication list has been reviewed and updated.   Current Medications    Current Outpatient Medications:   •  acetaminophen (TYLENOL) 650 MG 8 hr tablet, Take 650 mg by mouth Every 8 (Eight) Hours As Needed for Mild Pain or Moderate Pain., Disp: , Rfl:   •  amLODIPine (NORVASC) 10 MG tablet, Take 10 mg by mouth Daily., Disp: , Rfl:   •  Cholecalciferol (Vitamin D) 50 MCG (2000 UT) tablet, Take 2,000 Units by mouth Daily., Disp: , Rfl:    •  Diclofenac Sodium (VOLTAREN) 1 % gel gel, Apply 4 g topically to the appropriate area as directed 4 (Four) Times a Day As Needed (arthritis pain)., Disp: , Rfl:   •  ertapenem 1 g in sodium chloride 0.9 % 100 mL IVPB, Infuse 1 g into a venous catheter Daily for 23 days. Indications: Urinary Tract Infection, Disp: , Rfl:   •  ferrous sulfate 325 (65 FE) MG tablet, Take 1 tablet by mouth Daily With Breakfast., Disp: 30 tablet, Rfl: 1  •  HYDROcodone-acetaminophen (NORCO) 5-325 MG per tablet, Take 1 tablet by mouth Every 8 (Eight) Hours As Needed for Moderate Pain., Disp: 10 tablet, Rfl: 0  •  metoprolol tartrate (LOPRESSOR) 25 MG tablet, Take 25 mg by mouth 2 (Two) Times a Day., Disp: , Rfl:   •  Prenatal Vit-Fe Fumarate-FA (prenatal vitamin 27-0.8) 27-0.8 MG tablet tablet, Take 1 tablet by mouth Daily., Disp: , Rfl:   •  tamsulosin (FLOMAX) 0.4 MG capsule 24 hr capsule, Take 1 capsule by mouth Daily., Disp: , Rfl:   •  vancomycin (VANCOCIN) 125 MG capsule, Take 1 capsule by mouth Every 6 (Six) Hours for 26 days. Indications: Colon Inflammation due to Clostridium Bacteria Overgrowth, Disp: 104 capsule, Rfl: 0      Review of Systems    Review of Systems   Constitutional: Negative for activity change, appetite change, chills, diaphoresis and fever.   HENT: Negative for congestion, dental problem, drooling, mouth sores, sinus pressure and sneezing.    Eyes: Negative for blurred vision, double vision, photophobia and discharge.   Respiratory: Negative for apnea, cough, choking, chest tightness, shortness of breath and wheezing.    Cardiovascular: Negative for chest pain, palpitations and leg swelling.   Gastrointestinal: Negative for abdominal distention, abdominal pain, diarrhea, nausea and vomiting.   Genitourinary: Negative for dysuria, flank pain and frequency.   Musculoskeletal: Positive for gait problem. Negative for arthralgias, neck pain and neck stiffness.        Right hip pain resolved   Skin: Negative for  "rash.   Neurological: Negative for dizziness, tremors, seizures, syncope, speech difficulty, numbness, headache and confusion.   Psychiatric/Behavioral: Negative for agitation, behavioral problems, hallucinations and suicidal ideas.        Objective     Vital Signs:  There were no vitals taken for this visit.  Estimated body mass index is 39.33 kg/m² as calculated from the following:    Height as of 10/26/22: 160 cm (63\").    Weight as of 10/26/22: 101 kg (222 lb).    Physical Exam  Constitutional:       General: He is not in acute distress.     Appearance: Normal appearance. He is normal weight. He is not ill-appearing, toxic-appearing or diaphoretic.   HENT:      Head: Normocephalic and atraumatic.      Nose: Nose normal. No congestion or rhinorrhea.      Mouth/Throat:      Mouth: Mucous membranes are moist.      Pharynx: Oropharynx is clear.   Eyes:      General: No scleral icterus.     Extraocular Movements: Extraocular movements intact.      Pupils: Pupils are equal, round, and reactive to light.   Neck:      Vascular: No carotid bruit.   Cardiovascular:      Rate and Rhythm: Normal rate and regular rhythm.      Pulses: Normal pulses.      Heart sounds: No murmur heard.  Pulmonary:      Effort: Pulmonary effort is normal. No respiratory distress.      Breath sounds: Normal breath sounds. No stridor. No wheezing, rhonchi or rales.   Chest:      Chest wall: No tenderness.   Abdominal:      General: Abdomen is flat. Bowel sounds are normal. There is no distension.      Palpations: Abdomen is soft.      Tenderness: There is no abdominal tenderness. There is no guarding or rebound.   Musculoskeletal:      Cervical back: Normal range of motion and neck supple. No rigidity or tenderness.      Comments: Right hip surgical wound looks great with complete healing without any erythema or drainage noted   Lymphadenopathy:      Cervical: No cervical adenopathy.   Skin:     Coloration: Skin is not jaundiced.      Findings: " No lesion or rash.      Comments: Seborrheic dermatitis resolved   Neurological:      General: No focal deficit present.      Mental Status: He is alert and oriented to person, place, and time.   Psychiatric:         Mood and Affect: Mood normal.         Behavior: Behavior normal.          Result Review :  The following data was reviewed by Olena Ramsay MD     Lab Results  Lab Results   Component Value Date    WBC 12.09 (H) 10/20/2022    HGB 12.2 (L) 10/20/2022    HCT 37.8 10/20/2022    MCV 89.8 10/20/2022     10/20/2022     Lab Results   Component Value Date    GLUCOSE 113 (H) 10/20/2022    BUN 18 10/20/2022    CREATININE 1.01 10/20/2022    EGFRIFNONA 55 (L) 06/17/2021    BCR 17.8 10/20/2022    K 4.4 10/20/2022    CO2 27.1 10/20/2022    CALCIUM 9.3 10/20/2022    ALBUMIN 3.61 10/07/2022    AST 61 (H) 10/07/2022    ALT 28 10/07/2022      Lab Results   Component Value Date    CRP 10.21 (H) 10/20/2022        No results found for: ACANTHNAEG, AFBCX, BPERTUSSISCX, BLOODCX  No results found for: BCIDPCR, CXREFLEX, CSFCX, CULTURETIS  No results found for: CULTURES, HSVCX, URCX  No results found for: EYECULTURE, GCCX, HSVCULTURE, LABHSV  No results found for: LEGIONELLA, MRSACX, MUMPSCX, MYCOPLASCX  No results found for: NOCARDIACX, STOOLCX  No results found for: THROATCX, UNSTIMCULT, URINECX, CULTURE, VZVCULTUR  No results found for: VIRALCULTU, WOUNDCX    Radiology Results  CT Pelvis Without Contrast    Result Date: 10/6/2022  Impression: 1.  There is a large anterior abdominal wall ventral hernia containing nondilated large and small bowel. 2.  Again there is marked deformity of the right humeral head likely related to chronic AVN or advanced osteoarthritis. No fracture line is evident.  This report was finalized on 10/6/2022 10:52 AM by Dr. Moiz Lobo MD.      MRI Hip Right With & Without Contrast    Result Date: 10/7/2022  Impression: 1.  Again noted are fairly advanced changes of osteoarthritic change  and joint space collapse of the right hip. Subchondral cystic change of both the femoral head and acetabulum are noted. While no characteristic findings of AVN are noted this may represent sequelae of chronic AVN and subcortical collapse. 2.  Small right hip joint effusion that could be reactive secondary to advanced osteoarthritic change. 3.  In setting of fever and hip pain chronic septic arthritis could also be considered but the morphologic alteration of the joint do not appear to be acute.  This report was finalized on 10/7/2022 1:47 PM by Dr. Moiz Lobo MD.      XR Chest AP    Result Date: 10/7/2022  Impression:   Cardiomegaly.  This report was finalized on 10/7/2022 11:24 AM by Dr. Moiz Lobo MD.      XR Hip With or Without Pelvis 2 - 3 View Right    Result Date: 10/6/2022  Impression:   Very advanced right hip osteoarthritic change and possible chronic AVN with deformity of the femoral head contour.  This report was finalized on 10/6/2022 8:40 AM by Dr. Moiz Lobo MD.      FL Guided Aspiration Joint    Result Date: 10/10/2022  Impression: Technically successful diagnostic aspiration of the right hip joint.  This report was finalized on 10/10/2022 3:34 PM by Dr. Chidi Mackay MD.               Assessment / Plan         Assessment   Diagnoses and all orders for this visit:    1. ESBL (extended spectrum beta-lactamase) producing bacteria infection (Primary)    2. Bacteremia    3. Clostridium difficile colitis    4. 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 Seborrheic dermatitis    Patient is doing great from his right septic hip arthritis with complete resolution of the erythema and very good healing progress with complete closure of the wound status post suture removal.    Patient to continue ertapenem course and to finish his oral vancomycin 72 hours afterwards.    At this time patient shows no evidence suggestive of recurrent C. difficile colitis with no further  diarrhea.    Seborrheic dermatitis has completely resolved at this time and I do recommend to discontinue nystatin triamcinolone cream and to use down the road if needed for any relapse.    Patient to follow-up with infectious disease on as-needed basis if any concern of relapsing right hip septic arthritis or C. difficile colitis.             Follow Up   No follow-ups on file.    Visit Diagnoses:    ICD-10-CM ICD-9-CM   1. ESBL (extended spectrum beta-lactamase) producing bacteria infection  A49.9 041.89    Z16.12 V09.1   2. Bacteremia  R78.81 790.7   3. Clostridium difficile colitis  A04.72 008.45   4. 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 Seborrheic dermatitis  L21.9 690.10       Patient was given instructions and counseling regarding his condition or for health maintenance advice. Please see specific information pulled into the AVS if appropriate.     This document has been electronically signed by Olena Ramsay MD   November 1, 2022 10:24 EDT    Dictated Utilizing Dragon Dictation: Part of this note may be an electronic transcription/translation of spoken language to printed text using the Dragon Dictation System.

## 2022-11-18 LAB
FUNGUS SPEC CULT: ABNORMAL
FUNGUS SPEC FUNGUS STN: ABNORMAL

## 2022-11-25 ENCOUNTER — LAB REQUISITION (OUTPATIENT)
Dept: LAB | Facility: HOSPITAL | Age: 72
End: 2022-11-25

## 2022-11-25 DIAGNOSIS — R19.7 DIARRHEA, UNSPECIFIED: ICD-10-CM

## 2022-11-25 LAB
027 TOXIN: NORMAL
C DIFF TOX GENS STL QL NAA+PROBE: NEGATIVE

## 2022-11-25 PROCEDURE — 87493 C DIFF AMPLIFIED PROBE: CPT | Performed by: INTERNAL MEDICINE

## (undated) DEVICE — GLV SURG PREMIERPRO MIC LTX PF SZ7.5 BRN

## (undated) DEVICE — DBD-DRAPE,LAP,CHOLE,W/TROUGHS,STERILE: Brand: MEDLINE

## (undated) DEVICE — SUT NUROLON 0 MO7 CR8 18IN C541D

## (undated) DEVICE — BNDG ELAS CO-FLEX SLF ADHR 4IN5YD LF STRL

## (undated) DEVICE — 3M™ IOBAN™ 2 ANTIMICROBIAL INCISE DRAPE 6650EZ: Brand: IOBAN™ 2

## (undated) DEVICE — DRAPE,U/ SHT,SPLIT,PLAS,STERIL: Brand: MEDLINE

## (undated) DEVICE — SUT ETHLN 3-0 FS118IN 663H

## (undated) DEVICE — DRAPE,EXTREMITY,89X128,STERILE: Brand: MEDLINE

## (undated) DEVICE — KT DRN EVAC WND PVC PCH WTROC RND 10F400

## (undated) DEVICE — DRSNG PAD ABD 8X10IN STRL

## (undated) DEVICE — PK BASIC 70

## (undated) DEVICE — UNDERGLV SURG BIOGEL INDICAT PF 8 GRN

## (undated) DEVICE — SUT MNCRYL PLS ANTIB UD 2/0 CP1 27IN

## (undated) DEVICE — DRSNG WND STRIP OPTIFOAM AG A/MIC LF 3.5X14IN  STRL

## (undated) DEVICE — SUT PDS O CT1 CR/8 18IN Z740D

## (undated) DEVICE — CYSTO/BLADDER IRRIGATION SET, REGULATING CLAMP

## (undated) DEVICE — SUT ETHIB NO 2 X519H

## (undated) DEVICE — SUT ETHLN 2/0 FSLX 30IN 1674H

## (undated) DEVICE — DRSNG WND GZ CURAD OIL EMULSION 3X8IN LF STRL 1PK

## (undated) DEVICE — SUT SILK 2/0 FS BLK 18IN 685G

## (undated) DEVICE — DRSNG WND STRIP OPTIFOAM AG  A/MIC LTX  3.5X10IN STRL

## (undated) DEVICE — HOLDER: Brand: DEROYAL

## (undated) DEVICE — SHEET,DRAPE,53X77,STERILE: Brand: MEDLINE